# Patient Record
Sex: MALE | Race: WHITE | NOT HISPANIC OR LATINO
[De-identification: names, ages, dates, MRNs, and addresses within clinical notes are randomized per-mention and may not be internally consistent; named-entity substitution may affect disease eponyms.]

---

## 2017-01-10 ENCOUNTER — APPOINTMENT (OUTPATIENT)
Dept: FAMILY MEDICINE | Facility: CLINIC | Age: 77
End: 2017-01-10

## 2017-01-10 VITALS
SYSTOLIC BLOOD PRESSURE: 130 MMHG | DIASTOLIC BLOOD PRESSURE: 74 MMHG | OXYGEN SATURATION: 99 % | RESPIRATION RATE: 12 BRPM | BODY MASS INDEX: 20.86 KG/M2 | WEIGHT: 154 LBS | TEMPERATURE: 98.6 F | HEART RATE: 70 BPM | HEIGHT: 72 IN

## 2017-01-11 LAB
ALBUMIN SERPL ELPH-MCNC: 4.4 G/DL
ALP BLD-CCNC: 66 U/L
ALT SERPL-CCNC: 14 U/L
ANION GAP SERPL CALC-SCNC: 20 MMOL/L
AST SERPL-CCNC: 21 U/L
BASOPHILS # BLD AUTO: 0.03 K/UL
BASOPHILS NFR BLD AUTO: 0.3 %
BILIRUB SERPL-MCNC: 0.6 MG/DL
BUN SERPL-MCNC: 19 MG/DL
CALCIUM SERPL-MCNC: 9.5 MG/DL
CHLORIDE SERPL-SCNC: 101 MMOL/L
CO2 SERPL-SCNC: 23 MMOL/L
CREAT SERPL-MCNC: 0.91 MG/DL
EOSINOPHIL # BLD AUTO: 0.15 K/UL
EOSINOPHIL NFR BLD AUTO: 1.6 %
ERYTHROCYTE [SEDIMENTATION RATE] IN BLOOD BY WESTERGREN METHOD: 20 MM/HR
GLUCOSE SERPL-MCNC: 127 MG/DL
HCT VFR BLD CALC: 45.5 %
HGB BLD-MCNC: 14.8 G/DL
IMM GRANULOCYTES NFR BLD AUTO: 0.2 %
LYMPHOCYTES # BLD AUTO: 0.84 K/UL
LYMPHOCYTES NFR BLD AUTO: 8.7 %
MAN DIFF?: NORMAL
MCHC RBC-ENTMCNC: 32.5 GM/DL
MCHC RBC-ENTMCNC: 33.6 PG
MCV RBC AUTO: 103.4 FL
MONOCYTES # BLD AUTO: 1.05 K/UL
MONOCYTES NFR BLD AUTO: 10.9 %
NEUTROPHILS # BLD AUTO: 7.52 K/UL
NEUTROPHILS NFR BLD AUTO: 78.3 %
PLATELET # BLD AUTO: 200 K/UL
POTASSIUM SERPL-SCNC: 4.4 MMOL/L
PROT SERPL-MCNC: 6.8 G/DL
RBC # BLD: 4.4 M/UL
RBC # FLD: 13.1 %
SODIUM SERPL-SCNC: 144 MMOL/L
URATE SERPL-MCNC: 6.2 MG/DL
WBC # FLD AUTO: 9.61 K/UL

## 2017-01-16 ENCOUNTER — LABORATORY RESULT (OUTPATIENT)
Age: 77
End: 2017-01-16

## 2017-01-17 ENCOUNTER — APPOINTMENT (OUTPATIENT)
Dept: FAMILY MEDICINE | Facility: CLINIC | Age: 77
End: 2017-01-17

## 2017-01-17 VITALS
DIASTOLIC BLOOD PRESSURE: 70 MMHG | TEMPERATURE: 98.4 F | WEIGHT: 154 LBS | HEART RATE: 72 BPM | HEIGHT: 72 IN | OXYGEN SATURATION: 99 % | BODY MASS INDEX: 20.86 KG/M2 | RESPIRATION RATE: 13 BRPM | SYSTOLIC BLOOD PRESSURE: 130 MMHG

## 2017-01-17 RX ORDER — PREDNISONE 10 MG/1
10 TABLET ORAL
Qty: 30 | Refills: 1 | Status: DISCONTINUED | COMMUNITY
Start: 2017-01-10 | End: 2017-01-17

## 2017-01-18 LAB
24R-OH-CALCIDIOL SERPL-MCNC: 29.5 PG/ML
ALBUMIN SERPL ELPH-MCNC: 4.5 G/DL
ALP BLD-CCNC: 59 U/L
ALT SERPL-CCNC: 13 U/L
ANION GAP SERPL CALC-SCNC: 20 MMOL/L
AST SERPL-CCNC: 18 U/L
BASOPHILS # BLD AUTO: 0.08 K/UL
BASOPHILS NFR BLD AUTO: 0.9 %
BILIRUB SERPL-MCNC: 0.5 MG/DL
BUN SERPL-MCNC: 22 MG/DL
CALCIUM SERPL-MCNC: 9.8 MG/DL
CHLORIDE SERPL-SCNC: 98 MMOL/L
CHOLEST SERPL-MCNC: 173 MG/DL
CHOLEST/HDLC SERPL: 2.2 RATIO
CO2 SERPL-SCNC: 25 MMOL/L
CREAT SERPL-MCNC: 1.05 MG/DL
EOSINOPHIL # BLD AUTO: 0.24 K/UL
EOSINOPHIL NFR BLD AUTO: 2.7 %
ERYTHROCYTE [SEDIMENTATION RATE] IN BLOOD BY WESTERGREN METHOD: 2 MM/HR
FOLATE SERPL-MCNC: 16.5 NG/ML
GLUCOSE SERPL-MCNC: 133 MG/DL
HBA1C MFR BLD HPLC: 6.8 %
HCT VFR BLD CALC: 46.4 %
HDLC SERPL-MCNC: 77 MG/DL
HGB BLD-MCNC: 15.2 G/DL
LDLC SERPL CALC-MCNC: 80 MG/DL
LYMPHOCYTES # BLD AUTO: 1.83 K/UL
LYMPHOCYTES NFR BLD AUTO: 20.4 %
MAN DIFF?: NORMAL
MCHC RBC-ENTMCNC: 32.8 GM/DL
MCHC RBC-ENTMCNC: 33.8 PG
MCV RBC AUTO: 103.1 FL
MONOCYTES # BLD AUTO: 0.56 K/UL
MONOCYTES NFR BLD AUTO: 6.2 %
NEUTROPHILS # BLD AUTO: 6.26 K/UL
NEUTROPHILS NFR BLD AUTO: 69.8 %
PLATELET # BLD AUTO: 228 K/UL
POTASSIUM SERPL-SCNC: 4.6 MMOL/L
PROT SERPL-MCNC: 6.7 G/DL
PSA SERPL-MCNC: 1.8 NG/ML
RBC # BLD: 4.5 M/UL
RBC # FLD: 12.9 %
SODIUM SERPL-SCNC: 143 MMOL/L
T3 SERPL-MCNC: 110 NG/DL
T4 SERPL-MCNC: 8.5 UG/DL
TRIGL SERPL-MCNC: 78 MG/DL
TSH SERPL-ACNC: 2.89 UIU/ML
URATE SERPL-MCNC: 7.5 MG/DL
VIT B12 SERPL-MCNC: 391 PG/ML
WBC # FLD AUTO: 8.97 K/UL

## 2017-01-23 LAB — GLYCOMARK.: 8 UG/ML

## 2017-02-16 ENCOUNTER — APPOINTMENT (OUTPATIENT)
Dept: FAMILY MEDICINE | Facility: CLINIC | Age: 77
End: 2017-02-16

## 2017-03-08 ENCOUNTER — LABORATORY RESULT (OUTPATIENT)
Age: 77
End: 2017-03-08

## 2017-03-09 ENCOUNTER — APPOINTMENT (OUTPATIENT)
Dept: FAMILY MEDICINE | Facility: CLINIC | Age: 77
End: 2017-03-09

## 2017-03-09 VITALS
WEIGHT: 157 LBS | BODY MASS INDEX: 21.26 KG/M2 | DIASTOLIC BLOOD PRESSURE: 56 MMHG | HEART RATE: 76 BPM | SYSTOLIC BLOOD PRESSURE: 148 MMHG | RESPIRATION RATE: 13 BRPM | HEIGHT: 72 IN | OXYGEN SATURATION: 98 % | TEMPERATURE: 98.6 F

## 2017-03-16 LAB
ALBUMIN SERPL ELPH-MCNC: 4.4 G/DL
ALP BLD-CCNC: 66 U/L
ALT SERPL-CCNC: 17 U/L
ANION GAP SERPL CALC-SCNC: 18 MMOL/L
AST SERPL-CCNC: 16 U/L
BACTERIA UR CULT: NORMAL
BASOPHILS # BLD AUTO: 0.02 K/UL
BASOPHILS NFR BLD AUTO: 0.2 %
BILIRUB SERPL-MCNC: 0.5 MG/DL
BILIRUB UR QL STRIP: NORMAL
BUN SERPL-MCNC: 24 MG/DL
CALCIUM SERPL-MCNC: 9.9 MG/DL
CHLORIDE SERPL-SCNC: 101 MMOL/L
CLARITY UR: CLEAR
CO2 SERPL-SCNC: 22 MMOL/L
COLLECTION METHOD: NORMAL
CREAT SERPL-MCNC: 1.08 MG/DL
EOSINOPHIL # BLD AUTO: 0.12 K/UL
EOSINOPHIL NFR BLD AUTO: 1.5 %
GLUCOSE SERPL-MCNC: 152 MG/DL
GLUCOSE UR-MCNC: NORMAL
HCG UR QL: 0.2 EU/DL
HCT VFR BLD CALC: 42.5 %
HGB BLD-MCNC: 13.6 G/DL
HGB UR QL STRIP.AUTO: NORMAL
IMM GRANULOCYTES NFR BLD AUTO: 0.2 %
KETONES UR-MCNC: NORMAL
LEUKOCYTE ESTERASE UR QL STRIP: NORMAL
LYMPHOCYTES # BLD AUTO: 0.9 K/UL
LYMPHOCYTES NFR BLD AUTO: 11 %
MAN DIFF?: NORMAL
MCHC RBC-ENTMCNC: 32 GM/DL
MCHC RBC-ENTMCNC: 33.3 PG
MCV RBC AUTO: 103.9 FL
MONOCYTES # BLD AUTO: 0.86 K/UL
MONOCYTES NFR BLD AUTO: 10.5 %
NEUTROPHILS # BLD AUTO: 6.26 K/UL
NEUTROPHILS NFR BLD AUTO: 76.6 %
NITRITE UR QL STRIP: NORMAL
PH UR STRIP: 5.5
PLATELET # BLD AUTO: 192 K/UL
POTASSIUM SERPL-SCNC: 4.5 MMOL/L
PROT SERPL-MCNC: 6.8 G/DL
PROT UR STRIP-MCNC: NORMAL
RBC # BLD: 4.09 M/UL
RBC # FLD: 13.7 %
SODIUM SERPL-SCNC: 141 MMOL/L
SP GR UR STRIP: 1.01
URATE SERPL-MCNC: 3.6 MG/DL
WBC # FLD AUTO: 8.18 K/UL

## 2017-04-12 ENCOUNTER — APPOINTMENT (OUTPATIENT)
Dept: FAMILY MEDICINE | Facility: CLINIC | Age: 77
End: 2017-04-12

## 2017-04-12 ENCOUNTER — APPOINTMENT (OUTPATIENT)
Dept: SURGERY | Facility: CLINIC | Age: 77
End: 2017-04-12

## 2017-04-12 ENCOUNTER — LABORATORY RESULT (OUTPATIENT)
Age: 77
End: 2017-04-12

## 2017-04-12 VITALS
HEART RATE: 101 BPM | OXYGEN SATURATION: 99 % | DIASTOLIC BLOOD PRESSURE: 70 MMHG | TEMPERATURE: 98.3 F | BODY MASS INDEX: 21.67 KG/M2 | SYSTOLIC BLOOD PRESSURE: 156 MMHG | RESPIRATION RATE: 12 BRPM | HEIGHT: 72 IN | WEIGHT: 160 LBS

## 2017-04-12 VITALS
TEMPERATURE: 98.6 F | HEART RATE: 123 BPM | SYSTOLIC BLOOD PRESSURE: 160 MMHG | RESPIRATION RATE: 14 BRPM | DIASTOLIC BLOOD PRESSURE: 85 MMHG | HEIGHT: 69 IN | BODY MASS INDEX: 23.4 KG/M2 | WEIGHT: 158 LBS | OXYGEN SATURATION: 99 %

## 2017-04-12 VITALS — DIASTOLIC BLOOD PRESSURE: 90 MMHG | SYSTOLIC BLOOD PRESSURE: 167 MMHG

## 2017-04-13 LAB
ALBUMIN SERPL ELPH-MCNC: 4.4 G/DL
ALP BLD-CCNC: 59 U/L
ALT SERPL-CCNC: 21 U/L
ANION GAP SERPL CALC-SCNC: 17 MMOL/L
AST SERPL-CCNC: 25 U/L
BASOPHILS # BLD AUTO: 0.02 K/UL
BASOPHILS NFR BLD AUTO: 0.2 %
BILIRUB SERPL-MCNC: 0.4 MG/DL
BUN SERPL-MCNC: 25 MG/DL
CALCIUM SERPL-MCNC: 10.4 MG/DL
CHLORIDE SERPL-SCNC: 103 MMOL/L
CO2 SERPL-SCNC: 20 MMOL/L
CREAT SERPL-MCNC: 1.06 MG/DL
EOSINOPHIL # BLD AUTO: 0.08 K/UL
EOSINOPHIL NFR BLD AUTO: 0.9 %
GLUCOSE SERPL-MCNC: 179 MG/DL
HCT VFR BLD CALC: 41.7 %
HGB BLD-MCNC: 13.2 G/DL
IMM GRANULOCYTES NFR BLD AUTO: 0.2 %
LYMPHOCYTES # BLD AUTO: 0.86 K/UL
LYMPHOCYTES NFR BLD AUTO: 9.6 %
MAN DIFF?: NORMAL
MCHC RBC-ENTMCNC: 31.7 GM/DL
MCHC RBC-ENTMCNC: 32.8 PG
MCV RBC AUTO: 103.5 FL
MONOCYTES # BLD AUTO: 0.9 K/UL
MONOCYTES NFR BLD AUTO: 10 %
NEUTROPHILS # BLD AUTO: 7.09 K/UL
NEUTROPHILS NFR BLD AUTO: 79.1 %
PLATELET # BLD AUTO: 197 K/UL
POTASSIUM SERPL-SCNC: 5 MMOL/L
PROT SERPL-MCNC: 6.6 G/DL
RBC # BLD: 4.03 M/UL
RBC # FLD: 13.7 %
SODIUM SERPL-SCNC: 140 MMOL/L
WBC # FLD AUTO: 8.97 K/UL

## 2017-04-17 ENCOUNTER — APPOINTMENT (OUTPATIENT)
Dept: SURGERY | Facility: CLINIC | Age: 77
End: 2017-04-17

## 2017-04-17 VITALS
OXYGEN SATURATION: 99 % | SYSTOLIC BLOOD PRESSURE: 176 MMHG | HEART RATE: 95 BPM | TEMPERATURE: 97.9 F | DIASTOLIC BLOOD PRESSURE: 70 MMHG | RESPIRATION RATE: 15 BRPM | HEIGHT: 69 IN

## 2017-04-24 ENCOUNTER — APPOINTMENT (OUTPATIENT)
Dept: SURGERY | Facility: CLINIC | Age: 77
End: 2017-04-24

## 2017-05-01 ENCOUNTER — APPOINTMENT (OUTPATIENT)
Dept: SURGERY | Facility: CLINIC | Age: 77
End: 2017-05-01

## 2017-05-01 VITALS
TEMPERATURE: 98 F | DIASTOLIC BLOOD PRESSURE: 94 MMHG | HEART RATE: 90 BPM | BODY MASS INDEX: 23.11 KG/M2 | HEIGHT: 69 IN | SYSTOLIC BLOOD PRESSURE: 181 MMHG | OXYGEN SATURATION: 100 % | RESPIRATION RATE: 15 BRPM | WEIGHT: 156 LBS

## 2017-05-01 VITALS — DIASTOLIC BLOOD PRESSURE: 83 MMHG | SYSTOLIC BLOOD PRESSURE: 155 MMHG

## 2017-05-24 ENCOUNTER — APPOINTMENT (OUTPATIENT)
Dept: FAMILY MEDICINE | Facility: CLINIC | Age: 77
End: 2017-05-24

## 2017-05-24 VITALS
HEART RATE: 92 BPM | HEIGHT: 69 IN | TEMPERATURE: 98.5 F | BODY MASS INDEX: 22.66 KG/M2 | WEIGHT: 153 LBS | DIASTOLIC BLOOD PRESSURE: 70 MMHG | SYSTOLIC BLOOD PRESSURE: 156 MMHG | OXYGEN SATURATION: 97 %

## 2017-05-24 DIAGNOSIS — M79.673 PAIN IN UNSPECIFIED FOOT: ICD-10-CM

## 2017-05-24 RX ORDER — AMOXICILLIN AND CLAVULANATE POTASSIUM 875; 125 MG/1; MG/1
875-125 TABLET, COATED ORAL
Qty: 14 | Refills: 0 | Status: DISCONTINUED | COMMUNITY
Start: 2017-04-12 | End: 2017-05-24

## 2017-05-24 RX ORDER — ALLOPURINOL 300 MG/1
300 TABLET ORAL
Qty: 90 | Refills: 0 | Status: DISCONTINUED | COMMUNITY
Start: 2017-01-17 | End: 2017-05-24

## 2017-05-25 LAB
ALBUMIN SERPL ELPH-MCNC: 4.5 G/DL
ALP BLD-CCNC: 52 U/L
ALT SERPL-CCNC: 16 U/L
ANION GAP SERPL CALC-SCNC: 17 MMOL/L
AST SERPL-CCNC: 24 U/L
BASOPHILS # BLD AUTO: 0.03 K/UL
BASOPHILS NFR BLD AUTO: 0.3 %
BILIRUB SERPL-MCNC: 0.5 MG/DL
BUN SERPL-MCNC: 31 MG/DL
CALCIUM SERPL-MCNC: 10.2 MG/DL
CHLORIDE SERPL-SCNC: 104 MMOL/L
CHOLEST SERPL-MCNC: 209 MG/DL
CHOLEST/HDLC SERPL: 2.7 RATIO
CO2 SERPL-SCNC: 18 MMOL/L
CREAT SERPL-MCNC: 1.21 MG/DL
EOSINOPHIL # BLD AUTO: 0.14 K/UL
EOSINOPHIL NFR BLD AUTO: 1.5 %
GLUCOSE SERPL-MCNC: 120 MG/DL
HBA1C MFR BLD HPLC: 6.7 %
HCT VFR BLD CALC: 42.8 %
HDLC SERPL-MCNC: 77 MG/DL
HGB BLD-MCNC: 13.7 G/DL
IMM GRANULOCYTES NFR BLD AUTO: 0.3 %
LDLC SERPL CALC-MCNC: 109 MG/DL
LYMPHOCYTES # BLD AUTO: 1.01 K/UL
LYMPHOCYTES NFR BLD AUTO: 10.8 %
MAN DIFF?: NORMAL
MCHC RBC-ENTMCNC: 32 GM/DL
MCHC RBC-ENTMCNC: 32.9 PG
MCV RBC AUTO: 102.6 FL
MONOCYTES # BLD AUTO: 0.82 K/UL
MONOCYTES NFR BLD AUTO: 8.7 %
NEUTROPHILS # BLD AUTO: 7.36 K/UL
NEUTROPHILS NFR BLD AUTO: 78.4 %
PLATELET # BLD AUTO: 224 K/UL
POTASSIUM SERPL-SCNC: 4.9 MMOL/L
PROT SERPL-MCNC: 6.8 G/DL
RBC # BLD: 4.17 M/UL
RBC # FLD: 13.4 %
SODIUM SERPL-SCNC: 139 MMOL/L
TRIGL SERPL-MCNC: 115 MG/DL
URATE SERPL-MCNC: 8.8 MG/DL
WBC # FLD AUTO: 9.39 K/UL

## 2017-06-05 ENCOUNTER — APPOINTMENT (OUTPATIENT)
Dept: SURGERY | Facility: CLINIC | Age: 77
End: 2017-06-05

## 2017-06-13 ENCOUNTER — RESULT REVIEW (OUTPATIENT)
Age: 77
End: 2017-06-13

## 2017-06-14 ENCOUNTER — RESULT REVIEW (OUTPATIENT)
Age: 77
End: 2017-06-14

## 2017-06-21 ENCOUNTER — APPOINTMENT (OUTPATIENT)
Dept: FAMILY MEDICINE | Facility: CLINIC | Age: 77
End: 2017-06-21

## 2017-06-21 VITALS
WEIGHT: 154 LBS | TEMPERATURE: 99.2 F | OXYGEN SATURATION: 98 % | RESPIRATION RATE: 12 BRPM | HEART RATE: 98 BPM | SYSTOLIC BLOOD PRESSURE: 152 MMHG | HEIGHT: 69 IN | DIASTOLIC BLOOD PRESSURE: 74 MMHG | BODY MASS INDEX: 22.81 KG/M2

## 2017-06-22 ENCOUNTER — APPOINTMENT (OUTPATIENT)
Dept: FAMILY MEDICINE | Facility: CLINIC | Age: 77
End: 2017-06-22

## 2017-07-06 ENCOUNTER — APPOINTMENT (OUTPATIENT)
Dept: FAMILY MEDICINE | Facility: CLINIC | Age: 77
End: 2017-07-06

## 2017-07-06 VITALS — TEMPERATURE: 208.94 F

## 2017-07-06 VITALS
RESPIRATION RATE: 12 BRPM | HEIGHT: 69 IN | HEART RATE: 102 BPM | DIASTOLIC BLOOD PRESSURE: 80 MMHG | OXYGEN SATURATION: 98 % | SYSTOLIC BLOOD PRESSURE: 132 MMHG | BODY MASS INDEX: 22.81 KG/M2 | WEIGHT: 154 LBS

## 2017-07-06 RX ORDER — AMOXICILLIN AND CLAVULANATE POTASSIUM 875; 125 MG/1; MG/1
875-125 TABLET, COATED ORAL
Qty: 14 | Refills: 0 | Status: COMPLETED | COMMUNITY
Start: 2017-06-21 | End: 2017-07-06

## 2017-07-08 LAB
ALBUMIN SERPL ELPH-MCNC: 4.1 G/DL
ALP BLD-CCNC: 94 U/L
ALT SERPL-CCNC: 15 U/L
ANION GAP SERPL CALC-SCNC: 21 MMOL/L
AST SERPL-CCNC: 17 U/L
BASOPHILS # BLD AUTO: 0.05 K/UL
BASOPHILS NFR BLD AUTO: 0.6 %
BILIRUB SERPL-MCNC: 0.4 MG/DL
BUN SERPL-MCNC: 25 MG/DL
CALCIUM SERPL-MCNC: 9.7 MG/DL
CHLORIDE SERPL-SCNC: 104 MMOL/L
CO2 SERPL-SCNC: 19 MMOL/L
CREAT SERPL-MCNC: 1.08 MG/DL
EOSINOPHIL # BLD AUTO: 0.99 K/UL
EOSINOPHIL NFR BLD AUTO: 12.3 %
GLUCOSE SERPL-MCNC: 113 MG/DL
HCT VFR BLD CALC: 38.6 %
HGB BLD-MCNC: 12.5 G/DL
IMM GRANULOCYTES NFR BLD AUTO: 0.2 %
LYMPHOCYTES # BLD AUTO: 0.94 K/UL
LYMPHOCYTES NFR BLD AUTO: 11.6 %
MAN DIFF?: NORMAL
MCHC RBC-ENTMCNC: 32.4 GM/DL
MCHC RBC-ENTMCNC: 32.7 PG
MCV RBC AUTO: 101 FL
MONOCYTES # BLD AUTO: 0.97 K/UL
MONOCYTES NFR BLD AUTO: 12 %
NEUTROPHILS # BLD AUTO: 5.11 K/UL
NEUTROPHILS NFR BLD AUTO: 63.3 %
PLATELET # BLD AUTO: 302 K/UL
POTASSIUM SERPL-SCNC: 4.6 MMOL/L
PROT SERPL-MCNC: 6.7 G/DL
RBC # BLD: 3.82 M/UL
RBC # FLD: 13.3 %
SODIUM SERPL-SCNC: 144 MMOL/L
WBC # FLD AUTO: 8.08 K/UL

## 2017-07-13 ENCOUNTER — APPOINTMENT (OUTPATIENT)
Dept: FAMILY MEDICINE | Facility: CLINIC | Age: 77
End: 2017-07-13

## 2017-07-13 VITALS
HEIGHT: 69 IN | OXYGEN SATURATION: 99 % | SYSTOLIC BLOOD PRESSURE: 154 MMHG | HEART RATE: 94 BPM | DIASTOLIC BLOOD PRESSURE: 60 MMHG | TEMPERATURE: 98 F | WEIGHT: 147 LBS | RESPIRATION RATE: 12 BRPM | BODY MASS INDEX: 21.77 KG/M2

## 2017-07-13 RX ORDER — AMLODIPINE BESYLATE 5 MG/1
5 TABLET ORAL
Qty: 90 | Refills: 0 | Status: COMPLETED | COMMUNITY
Start: 2017-07-06 | End: 2017-07-13

## 2017-08-24 ENCOUNTER — APPOINTMENT (OUTPATIENT)
Dept: FAMILY MEDICINE | Facility: CLINIC | Age: 77
End: 2017-08-24
Payer: MEDICARE

## 2017-08-24 VITALS
WEIGHT: 148 LBS | OXYGEN SATURATION: 95 % | BODY MASS INDEX: 21.92 KG/M2 | SYSTOLIC BLOOD PRESSURE: 146 MMHG | HEART RATE: 80 BPM | TEMPERATURE: 98 F | RESPIRATION RATE: 12 BRPM | DIASTOLIC BLOOD PRESSURE: 58 MMHG | HEIGHT: 69 IN

## 2017-08-24 PROCEDURE — 99214 OFFICE O/P EST MOD 30 MIN: CPT | Mod: 25

## 2017-08-24 PROCEDURE — 36415 COLL VENOUS BLD VENIPUNCTURE: CPT

## 2017-08-30 ENCOUNTER — RX RENEWAL (OUTPATIENT)
Age: 77
End: 2017-08-30

## 2017-08-30 LAB
BASOPHILS # BLD AUTO: 0.04 K/UL
BASOPHILS NFR BLD AUTO: 0.6 %
CHOLEST SERPL-MCNC: 134 MG/DL
CHOLEST/HDLC SERPL: 2.7 RATIO
EOSINOPHIL # BLD AUTO: 0.23 K/UL
EOSINOPHIL NFR BLD AUTO: 3.2 %
HBA1C MFR BLD HPLC: 6.3 %
HCT VFR BLD CALC: 42.1 %
HDLC SERPL-MCNC: 50 MG/DL
HGB BLD-MCNC: 13.2 G/DL
IMM GRANULOCYTES NFR BLD AUTO: 0.3 %
LDLC SERPL CALC-MCNC: 65 MG/DL
LYMPHOCYTES # BLD AUTO: 1 K/UL
LYMPHOCYTES NFR BLD AUTO: 13.9 %
MAN DIFF?: NORMAL
MCHC RBC-ENTMCNC: 31.4 GM/DL
MCHC RBC-ENTMCNC: 32 PG
MCV RBC AUTO: 102.2 FL
MONOCYTES # BLD AUTO: 0.74 K/UL
MONOCYTES NFR BLD AUTO: 10.3 %
NEUTROPHILS # BLD AUTO: 5.14 K/UL
NEUTROPHILS NFR BLD AUTO: 71.7 %
PLATELET # BLD AUTO: 201 K/UL
RBC # BLD: 4.12 M/UL
RBC # FLD: 13.9 %
TRIGL SERPL-MCNC: 95 MG/DL
WBC # FLD AUTO: 7.17 K/UL

## 2017-10-04 ENCOUNTER — APPOINTMENT (OUTPATIENT)
Dept: FAMILY MEDICINE | Facility: CLINIC | Age: 77
End: 2017-10-04
Payer: MEDICARE

## 2017-10-04 VITALS
SYSTOLIC BLOOD PRESSURE: 148 MMHG | TEMPERATURE: 99 F | BODY MASS INDEX: 22.22 KG/M2 | HEART RATE: 102 BPM | HEIGHT: 69 IN | DIASTOLIC BLOOD PRESSURE: 68 MMHG | RESPIRATION RATE: 12 BRPM | OXYGEN SATURATION: 98 % | WEIGHT: 150 LBS

## 2017-10-04 PROCEDURE — G0008: CPT

## 2017-10-04 PROCEDURE — 90662 IIV NO PRSV INCREASED AG IM: CPT

## 2017-12-01 ENCOUNTER — APPOINTMENT (OUTPATIENT)
Dept: FAMILY MEDICINE | Facility: CLINIC | Age: 77
End: 2017-12-01
Payer: MEDICARE

## 2017-12-01 VITALS
DIASTOLIC BLOOD PRESSURE: 70 MMHG | BODY MASS INDEX: 22.81 KG/M2 | HEART RATE: 62 BPM | OXYGEN SATURATION: 96 % | WEIGHT: 154 LBS | RESPIRATION RATE: 12 BRPM | SYSTOLIC BLOOD PRESSURE: 156 MMHG | TEMPERATURE: 97.7 F | HEIGHT: 69 IN

## 2017-12-01 PROCEDURE — 36415 COLL VENOUS BLD VENIPUNCTURE: CPT

## 2017-12-01 PROCEDURE — 99214 OFFICE O/P EST MOD 30 MIN: CPT | Mod: 25

## 2017-12-06 ENCOUNTER — RESULT REVIEW (OUTPATIENT)
Age: 77
End: 2017-12-06

## 2017-12-06 LAB
ALBUMIN SERPL ELPH-MCNC: 4.6 G/DL
ALP BLD-CCNC: 60 U/L
ALT SERPL-CCNC: 15 U/L
ANION GAP SERPL CALC-SCNC: 18 MMOL/L
AST SERPL-CCNC: 25 U/L
BASOPHILS # BLD AUTO: 0.04 K/UL
BASOPHILS NFR BLD AUTO: 0.6 %
BILIRUB SERPL-MCNC: 0.5 MG/DL
BUN SERPL-MCNC: 24 MG/DL
CALCIUM SERPL-MCNC: 9.8 MG/DL
CHLORIDE SERPL-SCNC: 100 MMOL/L
CHOLEST SERPL-MCNC: 148 MG/DL
CHOLEST/HDLC SERPL: 2.1 RATIO
CO2 SERPL-SCNC: 24 MMOL/L
CREAT SERPL-MCNC: 1.12 MG/DL
EOSINOPHIL # BLD AUTO: 0.31 K/UL
EOSINOPHIL NFR BLD AUTO: 4.9 %
GLUCOSE SERPL-MCNC: 104 MG/DL
HBA1C MFR BLD HPLC: 6.5 %
HCT VFR BLD CALC: 42.5 %
HDLC SERPL-MCNC: 69 MG/DL
HGB BLD-MCNC: 14.3 G/DL
IMM GRANULOCYTES NFR BLD AUTO: 0.2 %
LDLC SERPL CALC-MCNC: 65 MG/DL
LYMPHOCYTES # BLD AUTO: 0.82 K/UL
LYMPHOCYTES NFR BLD AUTO: 12.9 %
MAN DIFF?: NORMAL
MCHC RBC-ENTMCNC: 33.6 GM/DL
MCHC RBC-ENTMCNC: 34.2 PG
MCV RBC AUTO: 101.7 FL
MONOCYTES # BLD AUTO: 0.81 K/UL
MONOCYTES NFR BLD AUTO: 12.8 %
NEUTROPHILS # BLD AUTO: 4.35 K/UL
NEUTROPHILS NFR BLD AUTO: 68.6 %
PLATELET # BLD AUTO: 201 K/UL
POTASSIUM SERPL-SCNC: 4.7 MMOL/L
PROT SERPL-MCNC: 7.1 G/DL
RBC # BLD: 4.18 M/UL
RBC # FLD: 13.2 %
SODIUM SERPL-SCNC: 142 MMOL/L
TRIGL SERPL-MCNC: 69 MG/DL
TSH SERPL-ACNC: 2.51 UIU/ML
WBC # FLD AUTO: 6.34 K/UL

## 2017-12-06 RX ORDER — COLCHICINE 0.6 MG/1
0.6 TABLET ORAL
Qty: 30 | Refills: 0 | Status: COMPLETED | COMMUNITY
Start: 2017-07-30

## 2018-03-01 ENCOUNTER — APPOINTMENT (OUTPATIENT)
Dept: FAMILY MEDICINE | Facility: CLINIC | Age: 78
End: 2018-03-01
Payer: MEDICARE

## 2018-03-01 VITALS
DIASTOLIC BLOOD PRESSURE: 70 MMHG | WEIGHT: 154 LBS | BODY MASS INDEX: 22.81 KG/M2 | TEMPERATURE: 98 F | HEIGHT: 69 IN | SYSTOLIC BLOOD PRESSURE: 127 MMHG | HEART RATE: 78 BPM

## 2018-03-01 LAB
BILIRUB UR QL STRIP: NORMAL
CLARITY UR: CLEAR
COLLECTION METHOD: NORMAL
GLUCOSE UR-MCNC: NORMAL
HCG UR QL: 0.2 EU/DL
HGB UR QL STRIP.AUTO: NORMAL
KETONES UR-MCNC: NORMAL
LEUKOCYTE ESTERASE UR QL STRIP: NORMAL
NITRITE UR QL STRIP: NORMAL
PH UR STRIP: 5.5
PROT UR STRIP-MCNC: NORMAL
SP GR UR STRIP: 1.01

## 2018-03-01 PROCEDURE — 81003 URINALYSIS AUTO W/O SCOPE: CPT | Mod: QW

## 2018-03-01 PROCEDURE — 36415 COLL VENOUS BLD VENIPUNCTURE: CPT

## 2018-03-01 PROCEDURE — 99214 OFFICE O/P EST MOD 30 MIN: CPT | Mod: 25

## 2018-03-08 ENCOUNTER — RESULT REVIEW (OUTPATIENT)
Age: 78
End: 2018-03-08

## 2018-03-08 LAB
25(OH)D3 SERPL-MCNC: 39.4 NG/ML
ALBUMIN SERPL ELPH-MCNC: 4.6 G/DL
ALP BLD-CCNC: 58 U/L
ALT SERPL-CCNC: 16 U/L
ANION GAP SERPL CALC-SCNC: 18 MMOL/L
AST SERPL-CCNC: 26 U/L
BASOPHILS # BLD AUTO: 0.04 K/UL
BASOPHILS NFR BLD AUTO: 0.6 %
BILIRUB SERPL-MCNC: 0.4 MG/DL
BUN SERPL-MCNC: 23 MG/DL
CALCIUM SERPL-MCNC: 9.8 MG/DL
CHLORIDE SERPL-SCNC: 102 MMOL/L
CHOLEST SERPL-MCNC: 150 MG/DL
CHOLEST/HDLC SERPL: 2.2 RATIO
CO2 SERPL-SCNC: 23 MMOL/L
CREAT SERPL-MCNC: 1.02 MG/DL
EOSINOPHIL # BLD AUTO: 0.24 K/UL
EOSINOPHIL NFR BLD AUTO: 3.4 %
GLUCOSE SERPL-MCNC: 131 MG/DL
HBA1C MFR BLD HPLC: 6.9 %
HCT VFR BLD CALC: 44.3 %
HDLC SERPL-MCNC: 68 MG/DL
HGB BLD-MCNC: 14.4 G/DL
IMM GRANULOCYTES NFR BLD AUTO: 0.4 %
LDLC SERPL CALC-MCNC: 67 MG/DL
LYMPHOCYTES # BLD AUTO: 1.17 K/UL
LYMPHOCYTES NFR BLD AUTO: 16.7 %
MAN DIFF?: NORMAL
MCHC RBC-ENTMCNC: 32.5 GM/DL
MCHC RBC-ENTMCNC: 33.8 PG
MCV RBC AUTO: 104 FL
MONOCYTES # BLD AUTO: 0.75 K/UL
MONOCYTES NFR BLD AUTO: 10.7 %
NEUTROPHILS # BLD AUTO: 4.78 K/UL
NEUTROPHILS NFR BLD AUTO: 68.2 %
PLATELET # BLD AUTO: 185 K/UL
POTASSIUM SERPL-SCNC: 5.1 MMOL/L
PROT SERPL-MCNC: 6.9 G/DL
RBC # BLD: 4.26 M/UL
RBC # FLD: 13.1 %
SODIUM SERPL-SCNC: 143 MMOL/L
TRIGL SERPL-MCNC: 73 MG/DL
TSH SERPL-ACNC: 2.33 UIU/ML
WBC # FLD AUTO: 7.01 K/UL

## 2018-05-08 ENCOUNTER — APPOINTMENT (OUTPATIENT)
Dept: FAMILY MEDICINE | Facility: CLINIC | Age: 78
End: 2018-05-08
Payer: MEDICARE

## 2018-05-08 VITALS
RESPIRATION RATE: 14 BRPM | OXYGEN SATURATION: 97 % | DIASTOLIC BLOOD PRESSURE: 78 MMHG | BODY MASS INDEX: 22.51 KG/M2 | HEART RATE: 64 BPM | TEMPERATURE: 98.4 F | SYSTOLIC BLOOD PRESSURE: 116 MMHG | WEIGHT: 152 LBS | HEIGHT: 69 IN

## 2018-05-08 DIAGNOSIS — Z86.39 PERSONAL HISTORY OF OTHER ENDOCRINE, NUTRITIONAL AND METABOLIC DISEASE: ICD-10-CM

## 2018-05-08 PROCEDURE — 99214 OFFICE O/P EST MOD 30 MIN: CPT

## 2018-05-15 ENCOUNTER — APPOINTMENT (OUTPATIENT)
Dept: FAMILY MEDICINE | Facility: CLINIC | Age: 78
End: 2018-05-15
Payer: MEDICARE

## 2018-05-15 VITALS
BODY MASS INDEX: 22.96 KG/M2 | DIASTOLIC BLOOD PRESSURE: 84 MMHG | HEIGHT: 69 IN | SYSTOLIC BLOOD PRESSURE: 124 MMHG | TEMPERATURE: 98 F | HEART RATE: 100 BPM | WEIGHT: 155 LBS | OXYGEN SATURATION: 99 %

## 2018-05-15 DIAGNOSIS — R94.31 ABNORMAL ELECTROCARDIOGRAM [ECG] [EKG]: ICD-10-CM

## 2018-05-15 PROCEDURE — 36415 COLL VENOUS BLD VENIPUNCTURE: CPT

## 2018-05-15 PROCEDURE — 99215 OFFICE O/P EST HI 40 MIN: CPT | Mod: 25

## 2018-05-15 PROCEDURE — 93000 ELECTROCARDIOGRAM COMPLETE: CPT | Mod: 59

## 2018-05-15 NOTE — PHYSICAL EXAM
[No Acute Distress] : no acute distress [Well Nourished] : well nourished [PERRL] : pupils equal round and reactive to light [Normal Outer Ear/Nose] : the outer ears and nose were normal in appearance [Normal Oropharynx] : the oropharynx was normal [Supple] : supple [No Lymphadenopathy] : no lymphadenopathy [No Respiratory Distress] : no respiratory distress  [Clear to Auscultation] : lungs were clear to auscultation bilaterally [Normal Rate] : normal rate  [Regular Rhythm] : with a regular rhythm [Normal S1, S2] : normal S1 and S2 [Normal Bowel Sounds] : normal bowel sounds [Normal Posterior Cervical Nodes] : no posterior cervical lymphadenopathy [Normal Anterior Cervical Nodes] : no anterior cervical lymphadenopathy [Normal Gait] : normal gait [Normal Affect] : the affect was normal [Normal Insight/Judgement] : insight and judgment were intact

## 2018-05-16 LAB
ALBUMIN SERPL ELPH-MCNC: 4.3 G/DL
ALP BLD-CCNC: 60 U/L
ALT SERPL-CCNC: 17 U/L
ANION GAP SERPL CALC-SCNC: 15 MMOL/L
AST SERPL-CCNC: 20 U/L
BASOPHILS # BLD AUTO: 0.05 K/UL
BASOPHILS NFR BLD AUTO: 0.6 %
BILIRUB SERPL-MCNC: 0.5 MG/DL
BUN SERPL-MCNC: 20 MG/DL
CALCIUM SERPL-MCNC: 9.5 MG/DL
CHLORIDE SERPL-SCNC: 100 MMOL/L
CO2 SERPL-SCNC: 27 MMOL/L
CREAT SERPL-MCNC: 1.07 MG/DL
EOSINOPHIL # BLD AUTO: 0.54 K/UL
EOSINOPHIL NFR BLD AUTO: 6.9 %
GLUCOSE SERPL-MCNC: 155 MG/DL
HCT VFR BLD CALC: 42.3 %
HGB BLD-MCNC: 13.8 G/DL
IMM GRANULOCYTES NFR BLD AUTO: 0.4 %
LYMPHOCYTES # BLD AUTO: 1.56 K/UL
LYMPHOCYTES NFR BLD AUTO: 19.9 %
MAN DIFF?: NORMAL
MCHC RBC-ENTMCNC: 32.6 GM/DL
MCHC RBC-ENTMCNC: 33.3 PG
MCV RBC AUTO: 101.9 FL
MONOCYTES # BLD AUTO: 0.82 K/UL
MONOCYTES NFR BLD AUTO: 10.5 %
NEUTROPHILS # BLD AUTO: 4.84 K/UL
NEUTROPHILS NFR BLD AUTO: 61.7 %
PLATELET # BLD AUTO: 208 K/UL
POTASSIUM SERPL-SCNC: 5 MMOL/L
PROT SERPL-MCNC: 7.1 G/DL
RBC # BLD: 4.15 M/UL
RBC # FLD: 13.6 %
SODIUM SERPL-SCNC: 142 MMOL/L
WBC # FLD AUTO: 7.84 K/UL

## 2018-05-21 NOTE — ASSESSMENT
[Cardiology consultation] : Cardiology consultation [Patient Optimized for Surgery] : Patient optimized for surgery [No Further Testing Recommended] : no further testing recommended [FreeTextEntry4] : Patient is clear for procedure

## 2018-05-21 NOTE — HISTORY OF PRESENT ILLNESS
[Hypertension] : ~T hypertension [FreeTextEntry1] : Cataract OS [FreeTextEntry2] : 5/22/18 [FreeTextEntry3] : Dr Arnold [FreeTextEntry4] : Patient presents for Preop Clearance. Blood work is non fasting.

## 2018-05-25 ENCOUNTER — APPOINTMENT (OUTPATIENT)
Dept: FAMILY MEDICINE | Facility: CLINIC | Age: 78
End: 2018-05-25
Payer: MEDICARE

## 2018-05-25 ENCOUNTER — NON-APPOINTMENT (OUTPATIENT)
Age: 78
End: 2018-05-25

## 2018-05-25 VITALS
TEMPERATURE: 98.2 F | HEART RATE: 98 BPM | OXYGEN SATURATION: 97 % | WEIGHT: 148 LBS | HEIGHT: 69 IN | RESPIRATION RATE: 14 BRPM | SYSTOLIC BLOOD PRESSURE: 124 MMHG | DIASTOLIC BLOOD PRESSURE: 78 MMHG | BODY MASS INDEX: 21.92 KG/M2

## 2018-05-25 PROCEDURE — 99214 OFFICE O/P EST MOD 30 MIN: CPT | Mod: 25

## 2018-05-25 PROCEDURE — 94010 BREATHING CAPACITY TEST: CPT | Mod: 59

## 2018-05-25 PROCEDURE — 36415 COLL VENOUS BLD VENIPUNCTURE: CPT

## 2018-05-26 LAB
ALBUMIN SERPL ELPH-MCNC: 3.8 G/DL
ALP BLD-CCNC: 50 U/L
ALT SERPL-CCNC: 15 U/L
ANION GAP SERPL CALC-SCNC: 17 MMOL/L
AST SERPL-CCNC: 22 U/L
BASOPHILS # BLD AUTO: 0.06 K/UL
BASOPHILS NFR BLD AUTO: 0.6 %
BILIRUB SERPL-MCNC: 0.4 MG/DL
BUN SERPL-MCNC: 22 MG/DL
CALCIUM SERPL-MCNC: 9 MG/DL
CHLORIDE SERPL-SCNC: 100 MMOL/L
CO2 SERPL-SCNC: 22 MMOL/L
CREAT SERPL-MCNC: 1.02 MG/DL
EOSINOPHIL # BLD AUTO: 0.6 K/UL
EOSINOPHIL NFR BLD AUTO: 6.2 %
GLUCOSE SERPL-MCNC: 235 MG/DL
HCT VFR BLD CALC: 41.3 %
HGB BLD-MCNC: 13.9 G/DL
IMM GRANULOCYTES NFR BLD AUTO: 0.9 %
LYMPHOCYTES # BLD AUTO: 1.2 K/UL
LYMPHOCYTES NFR BLD AUTO: 12.3 %
MAN DIFF?: NORMAL
MCHC RBC-ENTMCNC: 33.7 GM/DL
MCHC RBC-ENTMCNC: 33.8 PG
MCV RBC AUTO: 100.5 FL
MONOCYTES # BLD AUTO: 0.81 K/UL
MONOCYTES NFR BLD AUTO: 8.3 %
NEUTROPHILS # BLD AUTO: 6.96 K/UL
NEUTROPHILS NFR BLD AUTO: 71.7 %
PLATELET # BLD AUTO: 240 K/UL
POTASSIUM SERPL-SCNC: 4.3 MMOL/L
PROT SERPL-MCNC: 6.3 G/DL
RBC # BLD: 4.11 M/UL
RBC # FLD: 12.9 %
SODIUM SERPL-SCNC: 139 MMOL/L
WBC # FLD AUTO: 9.72 K/UL

## 2018-05-26 NOTE — PLAN
[FreeTextEntry1] : Advised to continue medications as directed. Life style and diet were reviewed.\par Will review blood work and chest xray when available

## 2018-05-26 NOTE — HISTORY OF PRESENT ILLNESS
[FreeTextEntry1] : Patient presents with C/O  cough and mucus in his throat that has become chronic. Denies fever, sore throat or ear pain.\par Recently had Cataract surgery of left eye. Very happy with results. Sub conjunctival  hemorrhage of left eye . Patient is under care of Ophthalmologist.\par Patient is under care of Cardiologist

## 2018-05-26 NOTE — PHYSICAL EXAM
[No Acute Distress] : no acute distress [Well Nourished] : well nourished [Normal Outer Ear/Nose] : the outer ears and nose were normal in appearance [Normal Oropharynx] : the oropharynx was normal [Supple] : supple [No Lymphadenopathy] : no lymphadenopathy [No Respiratory Distress] : no respiratory distress  [Clear to Auscultation] : lungs were clear to auscultation bilaterally [Normal Rate] : normal rate  [Regular Rhythm] : with a regular rhythm [Normal Posterior Cervical Nodes] : no posterior cervical lymphadenopathy [Normal Anterior Cervical Nodes] : no anterior cervical lymphadenopathy [Normal Gait] : normal gait [Coordination Grossly Intact] : coordination grossly intact [Normal Affect] : the affect was normal [Normal Insight/Judgement] : insight and judgment were intact [de-identified] : Left eye subconjunctival hemorrhage

## 2018-06-05 ENCOUNTER — APPOINTMENT (OUTPATIENT)
Dept: FAMILY MEDICINE | Facility: CLINIC | Age: 78
End: 2018-06-05
Payer: MEDICARE

## 2018-06-05 VITALS
DIASTOLIC BLOOD PRESSURE: 78 MMHG | HEART RATE: 95 BPM | TEMPERATURE: 99.2 F | SYSTOLIC BLOOD PRESSURE: 138 MMHG | OXYGEN SATURATION: 95 % | RESPIRATION RATE: 13 BRPM | BODY MASS INDEX: 21.86 KG/M2 | WEIGHT: 148 LBS

## 2018-06-05 PROCEDURE — 99214 OFFICE O/P EST MOD 30 MIN: CPT | Mod: 25

## 2018-06-05 PROCEDURE — 36415 COLL VENOUS BLD VENIPUNCTURE: CPT

## 2018-06-05 RX ORDER — AMOXICILLIN AND CLAVULANATE POTASSIUM 875; 125 MG/1; MG/1
875-125 TABLET, COATED ORAL
Qty: 14 | Refills: 0 | Status: DISCONTINUED | COMMUNITY
Start: 2018-05-08 | End: 2018-06-05

## 2018-06-05 NOTE — HISTORY OF PRESENT ILLNESS
[Spouse] : spouse [de-identified] : Patient presents for follow up of health care . Stated still having a productive cough. Chest  X ray was negative for active infection.\par Diet is good and drinking water daily. Compliant with medications.\par \par Eye Exam - 5/18\par Colonoscopy -Defers\par Pneumonia vaccine - Pneumovax 23 10/15

## 2018-06-05 NOTE — COUNSELING
[Healthy eating counseling provided] : healthy eating [Activity counseling provided] : activity [Low Fat Diet] : Low fat diet [Low Salt Diet] : Low salt diet [Walking] : Walking

## 2018-06-05 NOTE — PHYSICAL EXAM
[Normal Outer Ear/Nose] : the outer ears and nose were normal in appearance [No Respiratory Distress] : no respiratory distress  [de-identified] : Left eye healing subconjunctival hemorrhage

## 2018-06-06 ENCOUNTER — RESULT REVIEW (OUTPATIENT)
Age: 78
End: 2018-06-06

## 2018-06-06 LAB
25(OH)D3 SERPL-MCNC: 39.4 NG/ML
ALBUMIN SERPL ELPH-MCNC: 4.3 G/DL
ALP BLD-CCNC: 58 U/L
ALT SERPL-CCNC: 11 U/L
ANION GAP SERPL CALC-SCNC: 18 MMOL/L
AST SERPL-CCNC: 18 U/L
BASOPHILS # BLD AUTO: 0.03 K/UL
BASOPHILS NFR BLD AUTO: 0.4 %
BILIRUB SERPL-MCNC: 0.9 MG/DL
BUN SERPL-MCNC: 19 MG/DL
CALCIUM SERPL-MCNC: 9.3 MG/DL
CHLORIDE SERPL-SCNC: 104 MMOL/L
CHOLEST SERPL-MCNC: 146 MG/DL
CHOLEST/HDLC SERPL: 2.6 RATIO
CO2 SERPL-SCNC: 22 MMOL/L
CREAT SERPL-MCNC: 1.03 MG/DL
CREAT SPEC-SCNC: 90 MG/DL
EOSINOPHIL # BLD AUTO: 0.15 K/UL
EOSINOPHIL NFR BLD AUTO: 2 %
GLUCOSE SERPL-MCNC: 134 MG/DL
HBA1C MFR BLD HPLC: 6.8 %
HCT VFR BLD CALC: 44.4 %
HDLC SERPL-MCNC: 56 MG/DL
HGB BLD-MCNC: 14.2 G/DL
IMM GRANULOCYTES NFR BLD AUTO: 0.1 %
LDLC SERPL CALC-MCNC: 76 MG/DL
LYMPHOCYTES # BLD AUTO: 1.06 K/UL
LYMPHOCYTES NFR BLD AUTO: 14.1 %
MAN DIFF?: NORMAL
MCHC RBC-ENTMCNC: 32 GM/DL
MCHC RBC-ENTMCNC: 33.1 PG
MCV RBC AUTO: 103.5 FL
MICROALBUMIN 24H UR DL<=1MG/L-MCNC: 1.6 MG/DL
MICROALBUMIN/CREAT 24H UR-RTO: 18 MG/G
MONOCYTES # BLD AUTO: 0.81 K/UL
MONOCYTES NFR BLD AUTO: 10.7 %
NEUTROPHILS # BLD AUTO: 5.48 K/UL
NEUTROPHILS NFR BLD AUTO: 72.7 %
PLATELET # BLD AUTO: 221 K/UL
POTASSIUM SERPL-SCNC: 5 MMOL/L
PROT SERPL-MCNC: 6.7 G/DL
RBC # BLD: 4.29 M/UL
RBC # FLD: 13.8 %
SODIUM SERPL-SCNC: 144 MMOL/L
TRIGL SERPL-MCNC: 69 MG/DL
WBC # FLD AUTO: 7.54 K/UL

## 2018-06-09 ENCOUNTER — RESULT REVIEW (OUTPATIENT)
Age: 78
End: 2018-06-09

## 2018-06-09 ENCOUNTER — RX RENEWAL (OUTPATIENT)
Age: 78
End: 2018-06-09

## 2018-06-11 ENCOUNTER — RX RENEWAL (OUTPATIENT)
Age: 78
End: 2018-06-11

## 2018-08-03 ENCOUNTER — MED ADMIN CHARGE (OUTPATIENT)
Age: 78
End: 2018-08-03

## 2018-08-03 ENCOUNTER — OTHER (OUTPATIENT)
Age: 78
End: 2018-08-03

## 2018-08-03 ENCOUNTER — APPOINTMENT (OUTPATIENT)
Dept: FAMILY MEDICINE | Facility: CLINIC | Age: 78
End: 2018-08-03
Payer: MEDICARE

## 2018-08-03 VITALS
OXYGEN SATURATION: 98 % | WEIGHT: 149 LBS | HEART RATE: 83 BPM | RESPIRATION RATE: 13 BRPM | DIASTOLIC BLOOD PRESSURE: 74 MMHG | BODY MASS INDEX: 22.07 KG/M2 | HEIGHT: 69 IN | SYSTOLIC BLOOD PRESSURE: 122 MMHG | TEMPERATURE: 97.8 F

## 2018-08-03 LAB
BILIRUB UR QL STRIP: NORMAL
CLARITY UR: CLEAR
COLLECTION METHOD: NORMAL
GLUCOSE UR-MCNC: NORMAL
HCG UR QL: 0.2 EU/DL
HGB UR QL STRIP.AUTO: NORMAL
KETONES UR-MCNC: NORMAL
LEUKOCYTE ESTERASE UR QL STRIP: NORMAL
NITRITE UR QL STRIP: NORMAL
PH UR STRIP: 6
PROT UR STRIP-MCNC: NORMAL
SP GR UR STRIP: 1.01

## 2018-08-03 PROCEDURE — 81003 URINALYSIS AUTO W/O SCOPE: CPT | Mod: QW

## 2018-08-03 PROCEDURE — 99214 OFFICE O/P EST MOD 30 MIN: CPT | Mod: 25

## 2018-08-03 PROCEDURE — 36415 COLL VENOUS BLD VENIPUNCTURE: CPT

## 2018-08-03 PROCEDURE — 90715 TDAP VACCINE 7 YRS/> IM: CPT

## 2018-08-03 PROCEDURE — 90471 IMMUNIZATION ADMIN: CPT | Mod: 59

## 2018-08-03 NOTE — COUNSELING
[Healthy eating counseling provided] : healthy eating [Behavioral health counseling provided] : behavioral health  [Low Fat Diet] : Low fat diet [Low Salt Diet] : Low salt diet [Sleep ___ hours/day] : Sleep [unfilled] hours/day [Engage in a relaxing activity] : Engage in a relaxing activity [None] : None

## 2018-08-07 LAB
ALBUMIN SERPL ELPH-MCNC: 4.9 G/DL
ALP BLD-CCNC: 55 U/L
ALT SERPL-CCNC: 15 U/L
ANION GAP SERPL CALC-SCNC: 14 MMOL/L
AST SERPL-CCNC: 23 U/L
BASOPHILS # BLD AUTO: 0.04 K/UL
BASOPHILS NFR BLD AUTO: 0.5 %
BILIRUB SERPL-MCNC: 0.6 MG/DL
BUN SERPL-MCNC: 29 MG/DL
CALCIUM SERPL-MCNC: 10 MG/DL
CHLORIDE SERPL-SCNC: 100 MMOL/L
CO2 SERPL-SCNC: 26 MMOL/L
CREAT SERPL-MCNC: 1.05 MG/DL
EOSINOPHIL # BLD AUTO: 0.28 K/UL
EOSINOPHIL NFR BLD AUTO: 3.5 %
GLUCOSE SERPL-MCNC: 130 MG/DL
HCT VFR BLD CALC: 45.3 %
HGB BLD-MCNC: 14 G/DL
IMM GRANULOCYTES NFR BLD AUTO: 0.2 %
INR PPP: 0.87 RATIO
LYMPHOCYTES # BLD AUTO: 1.08 K/UL
LYMPHOCYTES NFR BLD AUTO: 13.4 %
MAN DIFF?: NORMAL
MCHC RBC-ENTMCNC: 30.9 GM/DL
MCHC RBC-ENTMCNC: 32 PG
MCV RBC AUTO: 103.7 FL
MONOCYTES # BLD AUTO: 0.79 K/UL
MONOCYTES NFR BLD AUTO: 9.8 %
NEUTROPHILS # BLD AUTO: 5.83 K/UL
NEUTROPHILS NFR BLD AUTO: 72.6 %
PLATELET # BLD AUTO: 198 K/UL
POTASSIUM SERPL-SCNC: 4.8 MMOL/L
PROT SERPL-MCNC: 6.7 G/DL
PT BLD: 9.8 SEC
RBC # BLD: 4.37 M/UL
RBC # FLD: 13.8 %
SODIUM SERPL-SCNC: 140 MMOL/L
WBC # FLD AUTO: 8.04 K/UL

## 2018-08-08 NOTE — PHYSICAL EXAM
[No Acute Distress] : no acute distress [Well Nourished] : well nourished [Normal Sclera/Conjunctiva] : normal sclera/conjunctiva [PERRL] : pupils equal round and reactive to light [Normal Outer Ear/Nose] : the outer ears and nose were normal in appearance [Normal Oropharynx] : the oropharynx was normal [Supple] : supple [No Respiratory Distress] : no respiratory distress  [Clear to Auscultation] : lungs were clear to auscultation bilaterally [Normal Rate] : normal rate  [Regular Rhythm] : with a regular rhythm [Soft] : abdomen soft [Non Tender] : non-tender [Normal Posterior Cervical Nodes] : no posterior cervical lymphadenopathy [Normal Anterior Cervical Nodes] : no anterior cervical lymphadenopathy [No Joint Swelling] : no joint swelling [Grossly Normal Strength/Tone] : grossly normal strength/tone [Normal Gait] : normal gait [Coordination Grossly Intact] : coordination grossly intact [No Focal Deficits] : no focal deficits [Normal Affect] : the affect was normal [Normal Insight/Judgement] : insight and judgment were intact [de-identified] : Left hand forefinger- healing laceration. No discharge

## 2018-08-08 NOTE — HISTORY OF PRESENT ILLNESS
[No Pertinent Pulmonary History] : no history of asthma, COPD, sleep apnea, or smoking [No Adverse Anesthesia Reaction] : no adverse anesthesia reaction in self or family member [No Pertinent Cardiac History] : no history of aortic stenosis, atrial fibrillation, coronary artery disease, recent myocardial infarction, or implantable device/pacemaker [Chronic Anticoagulation] : no chronic anticoagulation [Chronic Kidney Disease] : no chronic kidney disease [Diabetes] : no diabetes [FreeTextEntry1] : Cataract surgery OD [FreeTextEntry2] : 08/14/18 [FreeTextEntry3] : Dr Arnold [FreeTextEntry4] : Patient received Cardiac Clearance in May\par Stated he was playing with Daughter's dog and tooth lacerated left hand forefinger.

## 2018-09-05 ENCOUNTER — APPOINTMENT (OUTPATIENT)
Dept: FAMILY MEDICINE | Facility: CLINIC | Age: 78
End: 2018-09-05
Payer: MEDICARE

## 2018-09-05 VITALS
SYSTOLIC BLOOD PRESSURE: 120 MMHG | BODY MASS INDEX: 21.86 KG/M2 | DIASTOLIC BLOOD PRESSURE: 72 MMHG | RESPIRATION RATE: 13 BRPM | HEART RATE: 94 BPM | WEIGHT: 148 LBS | OXYGEN SATURATION: 98 % | TEMPERATURE: 98.1 F

## 2018-09-05 PROCEDURE — 99214 OFFICE O/P EST MOD 30 MIN: CPT | Mod: 25

## 2018-09-05 PROCEDURE — 36415 COLL VENOUS BLD VENIPUNCTURE: CPT

## 2018-09-05 RX ORDER — AMOXICILLIN AND CLAVULANATE POTASSIUM 875; 125 MG/1; MG/1
875-125 TABLET, COATED ORAL
Qty: 20 | Refills: 0 | Status: COMPLETED | COMMUNITY
Start: 2018-08-03 | End: 2018-09-05

## 2018-09-05 NOTE — HISTORY OF PRESENT ILLNESS
[Most Recent A1C: ___] : Most recent A1C was [unfilled] [No episodes] : No hypoglycemic episodes since the last visit. [Does not check] : Patient does not check blood glucose regularly [Understanding of foot care] : Patient expressed understanding of foot care [EyeExamDate] : 98/18

## 2018-09-05 NOTE — REVIEW OF SYSTEMS
[Nasal Discharge] : nasal discharge [Cough] : cough [Joint Pain] : joint pain [Negative] : Heme/Lymph

## 2018-09-05 NOTE — PHYSICAL EXAM
[No Acute Distress] : no acute distress [Well Nourished] : well nourished [Normal Sclera/Conjunctiva] : normal sclera/conjunctiva [PERRL] : pupils equal round and reactive to light [Normal Outer Ear/Nose] : the outer ears and nose were normal in appearance [Normal Oropharynx] : the oropharynx was normal [Supple] : supple [No Lymphadenopathy] : no lymphadenopathy [No Respiratory Distress] : no respiratory distress  [Normal Rate] : normal rate  [Regular Rhythm] : with a regular rhythm [Grossly Normal Strength/Tone] : grossly normal strength/tone [Normal Gait] : normal gait [Coordination Grossly Intact] : coordination grossly intact [No Focal Deficits] : no focal deficits [Normal Affect] : the affect was normal [Normal Insight/Judgement] : insight and judgment were intact

## 2018-09-05 NOTE — COUNSELING
[Healthy eating counseling provided] : healthy eating [Activity counseling provided] : activity [Low Fat Diet] : Low fat diet [Low Salt Diet] : Low salt diet [Walking] : Walking [Sleep ___ hours/day] : Sleep [unfilled] hours/day [Engage in a relaxing activity] : Engage in a relaxing activity

## 2018-09-06 LAB
25(OH)D3 SERPL-MCNC: 44.9 NG/ML
ALBUMIN SERPL ELPH-MCNC: 4.9 G/DL
ALP BLD-CCNC: 60 U/L
ALT SERPL-CCNC: 13 U/L
ANION GAP SERPL CALC-SCNC: 17 MMOL/L
APPEARANCE: CLEAR
AST SERPL-CCNC: 22 U/L
BASOPHILS # BLD AUTO: 0.02 K/UL
BASOPHILS NFR BLD AUTO: 0.3 %
BILIRUB SERPL-MCNC: 0.8 MG/DL
BILIRUBIN URINE: NEGATIVE
BLOOD URINE: NEGATIVE
BUN SERPL-MCNC: 25 MG/DL
CALCIUM SERPL-MCNC: 9.3 MG/DL
CHLORIDE SERPL-SCNC: 104 MMOL/L
CHOLEST SERPL-MCNC: 147 MG/DL
CHOLEST/HDLC SERPL: 2.5 RATIO
CO2 SERPL-SCNC: 23 MMOL/L
COLOR: YELLOW
CREAT SERPL-MCNC: 1.13 MG/DL
CREAT SPEC-SCNC: 97 MG/DL
EOSINOPHIL # BLD AUTO: 0.26 K/UL
EOSINOPHIL NFR BLD AUTO: 3.6 %
GLUCOSE QUALITATIVE U: NEGATIVE MG/DL
GLUCOSE SERPL-MCNC: 112 MG/DL
HBA1C MFR BLD HPLC: 6.5 %
HCT VFR BLD CALC: 45.8 %
HDLC SERPL-MCNC: 58 MG/DL
HGB BLD-MCNC: 15 G/DL
IMM GRANULOCYTES NFR BLD AUTO: 0.3 %
KETONES URINE: NEGATIVE
LDLC SERPL CALC-MCNC: 75 MG/DL
LEUKOCYTE ESTERASE URINE: NEGATIVE
LYMPHOCYTES # BLD AUTO: 1.26 K/UL
LYMPHOCYTES NFR BLD AUTO: 17.6 %
MAN DIFF?: NORMAL
MCHC RBC-ENTMCNC: 32.8 GM/DL
MCHC RBC-ENTMCNC: 34.1 PG
MCV RBC AUTO: 104.1 FL
MICROALBUMIN 24H UR DL<=1MG/L-MCNC: <1.2 MG/DL
MICROALBUMIN/CREAT 24H UR-RTO: NORMAL
MONOCYTES # BLD AUTO: 0.64 K/UL
MONOCYTES NFR BLD AUTO: 8.9 %
NEUTROPHILS # BLD AUTO: 4.96 K/UL
NEUTROPHILS NFR BLD AUTO: 69.3 %
NITRITE URINE: NEGATIVE
PH URINE: 6
PLATELET # BLD AUTO: 193 K/UL
POTASSIUM SERPL-SCNC: 5 MMOL/L
PROT SERPL-MCNC: 7 G/DL
PROTEIN URINE: NEGATIVE MG/DL
RBC # BLD: 4.4 M/UL
RBC # FLD: 13.9 %
SODIUM SERPL-SCNC: 144 MMOL/L
SPECIFIC GRAVITY URINE: 1.02
TRIGL SERPL-MCNC: 72 MG/DL
TSH SERPL-ACNC: 2.07 UIU/ML
UROBILINOGEN URINE: NEGATIVE MG/DL
WBC # FLD AUTO: 7.16 K/UL

## 2018-09-12 ENCOUNTER — RESULT REVIEW (OUTPATIENT)
Age: 78
End: 2018-09-12

## 2018-11-06 ENCOUNTER — APPOINTMENT (OUTPATIENT)
Dept: FAMILY MEDICINE | Facility: CLINIC | Age: 78
End: 2018-11-06
Payer: MEDICARE

## 2018-11-06 VITALS
HEART RATE: 111 BPM | TEMPERATURE: 100.9 F | WEIGHT: 148 LBS | SYSTOLIC BLOOD PRESSURE: 128 MMHG | HEIGHT: 69 IN | DIASTOLIC BLOOD PRESSURE: 74 MMHG | OXYGEN SATURATION: 96 % | BODY MASS INDEX: 21.92 KG/M2 | RESPIRATION RATE: 13 BRPM

## 2018-11-06 PROCEDURE — 99214 OFFICE O/P EST MOD 30 MIN: CPT | Mod: 25

## 2018-11-06 PROCEDURE — 94010 BREATHING CAPACITY TEST: CPT | Mod: 59

## 2018-11-06 RX ORDER — BENZONATATE 150 MG/1
150 CAPSULE ORAL TWICE DAILY
Qty: 20 | Refills: 0 | Status: COMPLETED | COMMUNITY
Start: 2018-11-06 | End: 2018-11-06

## 2018-11-06 NOTE — REVIEW OF SYSTEMS
[Fatigue] : fatigue [Nasal Discharge] : nasal discharge [Sore Throat] : sore throat [Cough] : cough [Negative] : Heme/Lymph

## 2018-11-06 NOTE — COUNSELING
[Healthy eating counseling provided] : healthy eating [Behavioral health counseling provided] : behavioral health  [Low Fat Diet] : Low fat diet [Low Salt Diet] : Low salt diet

## 2018-11-07 NOTE — HISTORY OF PRESENT ILLNESS
[FreeTextEntry8] : Patient presents with C/O productive cough, sinus congestion, sore throat and fever x 2 days. Wife has been ill\par Diet is good and drinking little water daily. Compliant with medications

## 2018-11-07 NOTE — PLAN
[FreeTextEntry1] : Advised to continue medications as directed. Life style and diet modifications were discussed.\par Will start antibiotic therapy. Supportive care was reviewed

## 2018-11-07 NOTE — HEALTH RISK ASSESSMENT
[No falls in past year] : Patient reported no falls in the past year [0] : 2) Feeling down, depressed, or hopeless: Not at all (0) [Patient declined colonoscopy] : Patient declined colonoscopy [HIV test declined] : HIV test declined [Hepatitis C test declined] : Hepatitis C test declined [] : No

## 2018-11-13 ENCOUNTER — APPOINTMENT (OUTPATIENT)
Dept: FAMILY MEDICINE | Facility: CLINIC | Age: 78
End: 2018-11-13
Payer: MEDICARE

## 2018-11-13 VITALS
RESPIRATION RATE: 13 BRPM | HEART RATE: 100 BPM | HEIGHT: 69 IN | DIASTOLIC BLOOD PRESSURE: 76 MMHG | WEIGHT: 152 LBS | TEMPERATURE: 98.8 F | SYSTOLIC BLOOD PRESSURE: 158 MMHG | OXYGEN SATURATION: 97 % | BODY MASS INDEX: 22.51 KG/M2

## 2018-11-13 PROCEDURE — 99214 OFFICE O/P EST MOD 30 MIN: CPT | Mod: 25

## 2018-11-13 PROCEDURE — 36415 COLL VENOUS BLD VENIPUNCTURE: CPT

## 2018-11-14 LAB
ALBUMIN SERPL ELPH-MCNC: 4.3 G/DL
ALP BLD-CCNC: 64 U/L
ALT SERPL-CCNC: 15 U/L
ANION GAP SERPL CALC-SCNC: 16 MMOL/L
AST SERPL-CCNC: 22 U/L
BASOPHILS # BLD AUTO: 0.09 K/UL
BASOPHILS NFR BLD AUTO: 1 %
BILIRUB SERPL-MCNC: 0.2 MG/DL
BUN SERPL-MCNC: 21 MG/DL
CALCIUM SERPL-MCNC: 9.2 MG/DL
CHLORIDE SERPL-SCNC: 103 MMOL/L
CO2 SERPL-SCNC: 24 MMOL/L
CREAT SERPL-MCNC: 1.01 MG/DL
EOSINOPHIL # BLD AUTO: 0.58 K/UL
EOSINOPHIL NFR BLD AUTO: 6.6 %
GLUCOSE SERPL-MCNC: 134 MG/DL
HCT VFR BLD CALC: 42.4 %
HGB BLD-MCNC: 13.5 G/DL
IMM GRANULOCYTES NFR BLD AUTO: 2.2 %
LYMPHOCYTES # BLD AUTO: 1.32 K/UL
LYMPHOCYTES NFR BLD AUTO: 15 %
MAN DIFF?: NORMAL
MCHC RBC-ENTMCNC: 31.8 GM/DL
MCHC RBC-ENTMCNC: 33 PG
MCV RBC AUTO: 103.7 FL
MONOCYTES # BLD AUTO: 1.02 K/UL
MONOCYTES NFR BLD AUTO: 11.6 %
NEUTROPHILS # BLD AUTO: 5.58 K/UL
NEUTROPHILS NFR BLD AUTO: 63.6 %
PLATELET # BLD AUTO: 262 K/UL
POTASSIUM SERPL-SCNC: 4.6 MMOL/L
PROT SERPL-MCNC: 6.7 G/DL
RBC # BLD: 4.09 M/UL
RBC # FLD: 13 %
SODIUM SERPL-SCNC: 143 MMOL/L
WBC # FLD AUTO: 8.78 K/UL

## 2018-11-14 RX ORDER — AMOXICILLIN AND CLAVULANATE POTASSIUM 875; 125 MG/1; MG/1
875-125 TABLET, COATED ORAL
Qty: 14 | Refills: 0 | Status: COMPLETED | COMMUNITY
Start: 2018-11-06 | End: 2018-11-14

## 2018-11-14 NOTE — REVIEW OF SYSTEMS
[Fatigue] : fatigue [Nasal Discharge] : nasal discharge [Cough] : cough [Muscle Pain] : muscle pain [Negative] : Heme/Lymph

## 2018-11-14 NOTE — HISTORY OF PRESENT ILLNESS
[de-identified] : Patient presents for follow up of URI. Still has productive cough, body aches and sinus congestion. Stated wife was diagnosed with Pneumonia.\par Diet is good and drinking water daily. Compliant with medications.

## 2018-11-14 NOTE — PLAN
[FreeTextEntry1] : Advised to continue medications as directed. Life style and diet modifications were reviewed.\par Will review testing results when available. Supportive care was discussed

## 2018-11-14 NOTE — PHYSICAL EXAM
[No Acute Distress] : no acute distress [Well Nourished] : well nourished [Normal Sclera/Conjunctiva] : normal sclera/conjunctiva [PERRL] : pupils equal round and reactive to light [Normal Outer Ear/Nose] : the outer ears and nose were normal in appearance [Normal Oropharynx] : the oropharynx was normal [Supple] : supple [Normal Rate] : normal rate  [Regular Rhythm] : with a regular rhythm [Normal S1, S2] : normal S1 and S2 [Grossly Normal Strength/Tone] : grossly normal strength/tone [Normal Gait] : normal gait [Coordination Grossly Intact] : coordination grossly intact [Normal Affect] : the affect was normal [Normal Insight/Judgement] : insight and judgment were intact [de-identified] : Bilateral wheeze

## 2018-11-14 NOTE — COUNSELING
Labs still abnormal elevation in glucose   [Healthy eating counseling provided] : healthy eating [Behavioral health counseling provided] : behavioral health

## 2018-11-27 ENCOUNTER — APPOINTMENT (OUTPATIENT)
Dept: FAMILY MEDICINE | Facility: CLINIC | Age: 78
End: 2018-11-27
Payer: MEDICARE

## 2018-11-27 VITALS
HEIGHT: 69 IN | DIASTOLIC BLOOD PRESSURE: 78 MMHG | OXYGEN SATURATION: 98 % | WEIGHT: 152 LBS | BODY MASS INDEX: 22.51 KG/M2 | TEMPERATURE: 98.1 F | HEART RATE: 75 BPM | RESPIRATION RATE: 13 BRPM | SYSTOLIC BLOOD PRESSURE: 110 MMHG

## 2018-11-27 PROCEDURE — 99214 OFFICE O/P EST MOD 30 MIN: CPT | Mod: 25

## 2018-11-27 PROCEDURE — 36415 COLL VENOUS BLD VENIPUNCTURE: CPT

## 2018-11-27 NOTE — HISTORY OF PRESENT ILLNESS
[de-identified] : Patient presents for follow up of Pneumonia. Feeling better. Still has post nasal drip.\par Diet is good and drinking water daily. Compliant with medications.

## 2018-11-27 NOTE — PHYSICAL EXAM
[No Acute Distress] : no acute distress [Well Nourished] : well nourished [Well Developed] : well developed [Normal Sclera/Conjunctiva] : normal sclera/conjunctiva [PERRL] : pupils equal round and reactive to light [Normal Outer Ear/Nose] : the outer ears and nose were normal in appearance [Normal Oropharynx] : the oropharynx was normal [No Lymphadenopathy] : no lymphadenopathy [No Respiratory Distress] : no respiratory distress  [Clear to Auscultation] : lungs were clear to auscultation bilaterally [No Accessory Muscle Use] : no accessory muscle use [Normal Rate] : normal rate  [Regular Rhythm] : with a regular rhythm [Normal S1, S2] : normal S1 and S2 [Normal Anterior Cervical Nodes] : no anterior cervical lymphadenopathy [Grossly Normal Strength/Tone] : grossly normal strength/tone [Normal Gait] : normal gait [Coordination Grossly Intact] : coordination grossly intact [No Focal Deficits] : no focal deficits

## 2018-11-28 LAB
ALBUMIN SERPL ELPH-MCNC: 4.5 G/DL
ALP BLD-CCNC: 54 U/L
ALT SERPL-CCNC: 17 U/L
ANION GAP SERPL CALC-SCNC: 11 MMOL/L
AST SERPL-CCNC: 27 U/L
BASOPHILS # BLD AUTO: 0.03 K/UL
BASOPHILS NFR BLD AUTO: 0.4 %
BILIRUB SERPL-MCNC: 0.6 MG/DL
BUN SERPL-MCNC: 28 MG/DL
CALCIUM SERPL-MCNC: 9.7 MG/DL
CHLORIDE SERPL-SCNC: 102 MMOL/L
CO2 SERPL-SCNC: 26 MMOL/L
CREAT SERPL-MCNC: 1.15 MG/DL
EOSINOPHIL # BLD AUTO: 0.35 K/UL
EOSINOPHIL NFR BLD AUTO: 4.6 %
GLUCOSE SERPL-MCNC: 127 MG/DL
HCT VFR BLD CALC: 42.7 %
HGB BLD-MCNC: 14.1 G/DL
IMM GRANULOCYTES NFR BLD AUTO: 0.5 %
LYMPHOCYTES # BLD AUTO: 1.51 K/UL
LYMPHOCYTES NFR BLD AUTO: 19.6 %
MAN DIFF?: NORMAL
MCHC RBC-ENTMCNC: 32.8 PG
MCHC RBC-ENTMCNC: 33 GM/DL
MCV RBC AUTO: 99.3 FL
MONOCYTES # BLD AUTO: 0.79 K/UL
MONOCYTES NFR BLD AUTO: 10.3 %
NEUTROPHILS # BLD AUTO: 4.97 K/UL
NEUTROPHILS NFR BLD AUTO: 64.6 %
PLATELET # BLD AUTO: 244 K/UL
POTASSIUM SERPL-SCNC: 5.1 MMOL/L
PROT SERPL-MCNC: 6.8 G/DL
RBC # BLD: 4.3 M/UL
RBC # FLD: 13.8 %
SODIUM SERPL-SCNC: 139 MMOL/L
WBC # FLD AUTO: 7.69 K/UL

## 2018-11-29 ENCOUNTER — RESULT REVIEW (OUTPATIENT)
Age: 78
End: 2018-11-29

## 2018-12-05 ENCOUNTER — APPOINTMENT (OUTPATIENT)
Dept: FAMILY MEDICINE | Facility: CLINIC | Age: 78
End: 2018-12-05

## 2018-12-11 ENCOUNTER — APPOINTMENT (OUTPATIENT)
Dept: FAMILY MEDICINE | Facility: CLINIC | Age: 78
End: 2018-12-11
Payer: MEDICARE

## 2018-12-11 VITALS
DIASTOLIC BLOOD PRESSURE: 80 MMHG | OXYGEN SATURATION: 97 % | WEIGHT: 152 LBS | TEMPERATURE: 97.5 F | SYSTOLIC BLOOD PRESSURE: 164 MMHG | HEART RATE: 95 BPM | HEIGHT: 69 IN | RESPIRATION RATE: 12 BRPM | BODY MASS INDEX: 22.51 KG/M2

## 2018-12-11 PROCEDURE — 36415 COLL VENOUS BLD VENIPUNCTURE: CPT

## 2018-12-11 PROCEDURE — 99214 OFFICE O/P EST MOD 30 MIN: CPT | Mod: 25

## 2018-12-11 RX ORDER — LEVOFLOXACIN 500 MG/1
500 TABLET, FILM COATED ORAL DAILY
Qty: 10 | Refills: 0 | Status: COMPLETED | COMMUNITY
Start: 2018-11-14 | End: 2018-12-11

## 2018-12-11 RX ORDER — METHYLPREDNISOLONE 4 MG/1
4 TABLET ORAL
Qty: 1 | Refills: 0 | Status: DISCONTINUED | COMMUNITY
Start: 2018-11-14 | End: 2018-12-11

## 2018-12-11 RX ORDER — BENZONATATE 100 MG/1
100 CAPSULE ORAL TWICE DAILY
Qty: 20 | Refills: 0 | Status: COMPLETED | COMMUNITY
Start: 2018-11-06 | End: 2018-12-11

## 2018-12-11 NOTE — PHYSICAL EXAM
[No Acute Distress] : no acute distress [Well Nourished] : well nourished [Normal Sclera/Conjunctiva] : normal sclera/conjunctiva [PERRL] : pupils equal round and reactive to light [Normal Outer Ear/Nose] : the outer ears and nose were normal in appearance [Normal Oropharynx] : the oropharynx was normal [No Respiratory Distress] : no respiratory distress  [Clear to Auscultation] : lungs were clear to auscultation bilaterally [Normal Rate] : normal rate  [Regular Rhythm] : with a regular rhythm [Normal S1, S2] : normal S1 and S2 [Normal Anterior Cervical Nodes] : no anterior cervical lymphadenopathy [Grossly Normal Strength/Tone] : grossly normal strength/tone [Normal Gait] : normal gait [Coordination Grossly Intact] : coordination grossly intact [Normal Affect] : the affect was normal [Normal Insight/Judgement] : insight and judgment were intact

## 2018-12-11 NOTE — HISTORY OF PRESENT ILLNESS
[de-identified] : Patient presents for follow up of health care. Stated still has a little cough and phlegm with post nasal drip.\par Denies sore throat, fever or head ache. \par Diet is good and drinking water daily. Compliant with medications.

## 2018-12-12 LAB
25(OH)D3 SERPL-MCNC: 32.1 NG/ML
ALBUMIN SERPL ELPH-MCNC: 4.7 G/DL
ALP BLD-CCNC: 57 U/L
ALT SERPL-CCNC: 19 U/L
ANION GAP SERPL CALC-SCNC: 14 MMOL/L
AST SERPL-CCNC: 23 U/L
BASOPHILS # BLD AUTO: 0.04 K/UL
BASOPHILS NFR BLD AUTO: 0.6 %
BILIRUB SERPL-MCNC: 0.5 MG/DL
BUN SERPL-MCNC: 20 MG/DL
CALCIUM SERPL-MCNC: 9.6 MG/DL
CHLORIDE SERPL-SCNC: 104 MMOL/L
CHOLEST SERPL-MCNC: 149 MG/DL
CHOLEST/HDLC SERPL: 2.4 RATIO
CO2 SERPL-SCNC: 27 MMOL/L
CREAT SERPL-MCNC: 1.09 MG/DL
EOSINOPHIL # BLD AUTO: 0.43 K/UL
EOSINOPHIL NFR BLD AUTO: 6.5 %
GLUCOSE SERPL-MCNC: 116 MG/DL
HBA1C MFR BLD HPLC: 6.9 %
HCT VFR BLD CALC: 43.4 %
HDLC SERPL-MCNC: 63 MG/DL
HGB BLD-MCNC: 14 G/DL
IMM GRANULOCYTES NFR BLD AUTO: 0.2 %
LDLC SERPL CALC-MCNC: 70 MG/DL
LYMPHOCYTES # BLD AUTO: 1.27 K/UL
LYMPHOCYTES NFR BLD AUTO: 19.3 %
MAN DIFF?: NORMAL
MCHC RBC-ENTMCNC: 32.3 GM/DL
MCHC RBC-ENTMCNC: 33.3 PG
MCV RBC AUTO: 103.3 FL
MONOCYTES # BLD AUTO: 0.72 K/UL
MONOCYTES NFR BLD AUTO: 10.9 %
NEUTROPHILS # BLD AUTO: 4.11 K/UL
NEUTROPHILS NFR BLD AUTO: 62.5 %
PLATELET # BLD AUTO: 163 K/UL
POTASSIUM SERPL-SCNC: 4.8 MMOL/L
PROT SERPL-MCNC: 6.6 G/DL
RBC # BLD: 4.2 M/UL
RBC # FLD: 14 %
SODIUM SERPL-SCNC: 145 MMOL/L
TRIGL SERPL-MCNC: 82 MG/DL
TSH SERPL-ACNC: 2.71 UIU/ML
WBC # FLD AUTO: 6.58 K/UL

## 2018-12-31 ENCOUNTER — APPOINTMENT (OUTPATIENT)
Dept: FAMILY MEDICINE | Facility: CLINIC | Age: 78
End: 2018-12-31
Payer: MEDICARE

## 2018-12-31 VITALS
HEART RATE: 86 BPM | RESPIRATION RATE: 13 BRPM | OXYGEN SATURATION: 97 % | HEIGHT: 69 IN | TEMPERATURE: 98.7 F | DIASTOLIC BLOOD PRESSURE: 87 MMHG | BODY MASS INDEX: 22.51 KG/M2 | WEIGHT: 152 LBS | SYSTOLIC BLOOD PRESSURE: 122 MMHG

## 2018-12-31 DIAGNOSIS — Z78.9 OTHER SPECIFIED HEALTH STATUS: ICD-10-CM

## 2018-12-31 DIAGNOSIS — Z72.3 LACK OF PHYSICAL EXERCISE: ICD-10-CM

## 2018-12-31 DIAGNOSIS — Z87.09 PERSONAL HISTORY OF OTHER DISEASES OF THE RESPIRATORY SYSTEM: ICD-10-CM

## 2018-12-31 DIAGNOSIS — H11.32 CONJUNCTIVAL HEMORRHAGE, LEFT EYE: ICD-10-CM

## 2018-12-31 DIAGNOSIS — R05 COUGH: ICD-10-CM

## 2018-12-31 DIAGNOSIS — Z87.01 PERSONAL HISTORY OF PNEUMONIA (RECURRENT): ICD-10-CM

## 2018-12-31 PROCEDURE — 99214 OFFICE O/P EST MOD 30 MIN: CPT

## 2019-03-11 ENCOUNTER — APPOINTMENT (OUTPATIENT)
Dept: FAMILY MEDICINE | Facility: CLINIC | Age: 79
End: 2019-03-11
Payer: MEDICARE

## 2019-03-11 VITALS
HEIGHT: 69 IN | WEIGHT: 156 LBS | HEART RATE: 115 BPM | OXYGEN SATURATION: 97 % | DIASTOLIC BLOOD PRESSURE: 94 MMHG | SYSTOLIC BLOOD PRESSURE: 182 MMHG | BODY MASS INDEX: 23.11 KG/M2

## 2019-03-11 PROCEDURE — 36415 COLL VENOUS BLD VENIPUNCTURE: CPT

## 2019-03-11 PROCEDURE — 99214 OFFICE O/P EST MOD 30 MIN: CPT | Mod: 25

## 2019-03-11 RX ORDER — OFLOXACIN 3 MG/ML
0.3 SOLUTION/ DROPS OPHTHALMIC
Qty: 5 | Refills: 0 | Status: COMPLETED | COMMUNITY
Start: 2018-05-07 | End: 2019-03-11

## 2019-03-11 RX ORDER — PROMETHAZINE HYDROCHLORIDE AND DEXTROMETHORPHAN HYDROBROMIDE ORAL SOLUTION 15; 6.25 MG/5ML; MG/5ML
6.25-15 SOLUTION ORAL
Qty: 240 | Refills: 1 | Status: DISCONTINUED | COMMUNITY
Start: 2018-12-31 | End: 2019-03-11

## 2019-03-11 RX ORDER — AMOXICILLIN AND CLAVULANATE POTASSIUM 875; 125 MG/1; MG/1
875-125 TABLET, COATED ORAL TWICE DAILY
Qty: 20 | Refills: 2 | Status: DISCONTINUED | COMMUNITY
Start: 2018-12-31 | End: 2019-03-11

## 2019-03-12 NOTE — HISTORY OF PRESENT ILLNESS
[FreeTextEntry1] : Patient is here to follow up of tamiko care. Stated still feeling fatigue due to not sleeping well at night. \par Diet is good and drinking water daily. Compliant with medications

## 2019-03-12 NOTE — PHYSICAL EXAM
[No Acute Distress] : no acute distress [Well Nourished] : well nourished [Normal Sclera/Conjunctiva] : normal sclera/conjunctiva [PERRL] : pupils equal round and reactive to light [Normal Outer Ear/Nose] : the outer ears and nose were normal in appearance [Normal Oropharynx] : the oropharynx was normal [No Respiratory Distress] : no respiratory distress  [Normal Rate] : normal rate  [Normal Posterior Cervical Nodes] : no posterior cervical lymphadenopathy [Normal Anterior Cervical Nodes] : no anterior cervical lymphadenopathy [Grossly Normal Strength/Tone] : grossly normal strength/tone [Normal Gait] : normal gait [Coordination Grossly Intact] : coordination grossly intact [No Focal Deficits] : no focal deficits [Normal Affect] : the affect was normal [Normal Insight/Judgement] : insight and judgment were intact

## 2019-03-13 LAB
25(OH)D3 SERPL-MCNC: 36.4 NG/ML
ALBUMIN SERPL ELPH-MCNC: 4.6 G/DL
ALP BLD-CCNC: 56 U/L
ALT SERPL-CCNC: 15 U/L
ANION GAP SERPL CALC-SCNC: 14 MMOL/L
APPEARANCE: CLEAR
AST SERPL-CCNC: 28 U/L
BASOPHILS # BLD AUTO: 0.05 K/UL
BASOPHILS NFR BLD AUTO: 0.5 %
BILIRUB SERPL-MCNC: 0.6 MG/DL
BILIRUBIN URINE: NEGATIVE
BLOOD URINE: NEGATIVE
BUN SERPL-MCNC: 22 MG/DL
CALCIUM SERPL-MCNC: 10 MG/DL
CHLORIDE SERPL-SCNC: 105 MMOL/L
CHOLEST SERPL-MCNC: 133 MG/DL
CHOLEST/HDLC SERPL: 2.3 RATIO
CO2 SERPL-SCNC: 23 MMOL/L
COLOR: YELLOW
CREAT SERPL-MCNC: 1.05 MG/DL
EOSINOPHIL # BLD AUTO: 0.17 K/UL
EOSINOPHIL NFR BLD AUTO: 1.8 %
GLUCOSE QUALITATIVE U: NEGATIVE
GLUCOSE SERPL-MCNC: 126 MG/DL
HBA1C MFR BLD HPLC: 7.5 %
HCT VFR BLD CALC: 44.7 %
HDLC SERPL-MCNC: 58 MG/DL
HGB BLD-MCNC: 14.3 G/DL
IMM GRANULOCYTES NFR BLD AUTO: 0.3 %
KETONES URINE: NEGATIVE
LDLC SERPL CALC-MCNC: 61 MG/DL
LEUKOCYTE ESTERASE URINE: NEGATIVE
LYMPHOCYTES # BLD AUTO: 1.6 K/UL
LYMPHOCYTES NFR BLD AUTO: 17.2 %
MAN DIFF?: NORMAL
MCHC RBC-ENTMCNC: 32 GM/DL
MCHC RBC-ENTMCNC: 33.1 PG
MCV RBC AUTO: 103.5 FL
MONOCYTES # BLD AUTO: 0.91 K/UL
MONOCYTES NFR BLD AUTO: 9.8 %
NEUTROPHILS # BLD AUTO: 6.55 K/UL
NEUTROPHILS NFR BLD AUTO: 70.4 %
NITRITE URINE: NEGATIVE
PH URINE: 6
PLATELET # BLD AUTO: 196 K/UL
POTASSIUM SERPL-SCNC: 4.5 MMOL/L
PROT SERPL-MCNC: 7.2 G/DL
PROTEIN URINE: NORMAL
RBC # BLD: 4.32 M/UL
RBC # FLD: 12.6 %
SODIUM SERPL-SCNC: 142 MMOL/L
SPECIFIC GRAVITY URINE: 1.02
TRIGL SERPL-MCNC: 71 MG/DL
UROBILINOGEN URINE: NORMAL
WBC # FLD AUTO: 9.31 K/UL

## 2019-03-14 ENCOUNTER — RX RENEWAL (OUTPATIENT)
Age: 79
End: 2019-03-14

## 2019-03-26 ENCOUNTER — APPOINTMENT (OUTPATIENT)
Dept: FAMILY MEDICINE | Facility: CLINIC | Age: 79
End: 2019-03-26
Payer: MEDICARE

## 2019-03-26 VITALS
TEMPERATURE: 98.2 F | DIASTOLIC BLOOD PRESSURE: 90 MMHG | SYSTOLIC BLOOD PRESSURE: 180 MMHG | OXYGEN SATURATION: 97 % | WEIGHT: 156 LBS | HEART RATE: 110 BPM | HEIGHT: 69 IN | BODY MASS INDEX: 23.11 KG/M2

## 2019-03-26 PROCEDURE — 99214 OFFICE O/P EST MOD 30 MIN: CPT

## 2019-03-26 RX ORDER — LOTEPREDNOL ETABONATE 5 MG/G
0.5 GEL OPHTHALMIC
Qty: 5 | Refills: 0 | Status: DISCONTINUED | COMMUNITY
Start: 2018-05-08 | End: 2019-03-26

## 2019-03-26 RX ORDER — BROMFENAC SODIUM 0.7 MG/ML
0.07 SOLUTION/ DROPS OPHTHALMIC
Qty: 3 | Refills: 0 | Status: DISCONTINUED | COMMUNITY
Start: 2018-05-08 | End: 2019-03-26

## 2019-03-26 RX ORDER — ALBUTEROL SULFATE 108 UG/1
108 (90 BASE) AEROSOL, METERED RESPIRATORY (INHALATION) EVERY 6 HOURS
Qty: 1 | Refills: 2 | Status: DISCONTINUED | COMMUNITY
Start: 2018-06-05 | End: 2019-03-26

## 2019-03-26 NOTE — REVIEW OF SYSTEMS
[Fatigue] : fatigue [Nasal Discharge] : nasal discharge [Sore Throat] : sore throat [Hoarseness] : hoarseness [Joint Pain] : joint pain [Negative] : Heme/Lymph

## 2019-03-26 NOTE — PLAN
[FreeTextEntry1] : Advised to continue medications as directed. Life style and diet modifications were discussed.\par Will begin antibiotic therapy. Supportive care was reviewed

## 2019-03-26 NOTE — PHYSICAL EXAM
[No Acute Distress] : no acute distress [Well Nourished] : well nourished [Well Developed] : well developed [Normal Sclera/Conjunctiva] : normal sclera/conjunctiva [PERRL] : pupils equal round and reactive to light [EOMI] : extraocular movements intact [Normal Outer Ear/Nose] : the outer ears and nose were normal in appearance [Normal Oropharynx] : the oropharynx was normal [Supple] : supple [No Respiratory Distress] : no respiratory distress  [Clear to Auscultation] : lungs were clear to auscultation bilaterally [Normal Rate] : normal rate  [Regular Rhythm] : with a regular rhythm [Normal S1, S2] : normal S1 and S2 [Normal Anterior Cervical Nodes] : no anterior cervical lymphadenopathy

## 2019-03-26 NOTE — HISTORY OF PRESENT ILLNESS
[FreeTextEntry8] : Patient c/o sore throat x 2 weeks and then developed sinus congestion and PND. Denies fever,N/V/D or head ache.\par Diet is fair and drinking little water daily. Compliant with medications

## 2019-04-02 ENCOUNTER — APPOINTMENT (OUTPATIENT)
Dept: FAMILY MEDICINE | Facility: CLINIC | Age: 79
End: 2019-04-02
Payer: MEDICARE

## 2019-04-02 VITALS
OXYGEN SATURATION: 99 % | TEMPERATURE: 97.1 F | HEIGHT: 69 IN | HEART RATE: 94 BPM | SYSTOLIC BLOOD PRESSURE: 154 MMHG | RESPIRATION RATE: 14 BRPM | BODY MASS INDEX: 23.11 KG/M2 | WEIGHT: 156 LBS | DIASTOLIC BLOOD PRESSURE: 80 MMHG

## 2019-04-02 PROCEDURE — 99214 OFFICE O/P EST MOD 30 MIN: CPT | Mod: 25

## 2019-04-02 PROCEDURE — 69210 REMOVE IMPACTED EAR WAX UNI: CPT

## 2019-04-02 RX ORDER — AMOXICILLIN AND CLAVULANATE POTASSIUM 875; 125 MG/1; MG/1
875-125 TABLET, COATED ORAL
Qty: 14 | Refills: 0 | Status: DISCONTINUED | COMMUNITY
Start: 2019-03-26 | End: 2019-04-02

## 2019-04-02 NOTE — HISTORY OF PRESENT ILLNESS
[FreeTextEntry1] : Pt is here for follow up pharyngitis and URI.  Pt took last ABX today, pt is feeling much better.  Pt was to follow up and check ears bilateral  irrigation of cerumen\par Diet is improving and drinking little water daily. Compliant with medications.

## 2019-04-02 NOTE — PHYSICAL EXAM
[No Acute Distress] : no acute distress [Well Nourished] : well nourished [Well Developed] : well developed [Normal Sclera/Conjunctiva] : normal sclera/conjunctiva [Normal Outer Ear/Nose] : the outer ears and nose were normal in appearance [Normal Oropharynx] : the oropharynx was normal [Supple] : supple [No Respiratory Distress] : no respiratory distress  [Clear to Auscultation] : lungs were clear to auscultation bilaterally [Normal Rate] : normal rate  [Regular Rhythm] : with a regular rhythm [Normal S1, S2] : normal S1 and S2 [Normal Anterior Cervical Nodes] : no anterior cervical lymphadenopathy [Grossly Normal Strength/Tone] : grossly normal strength/tone [Normal Gait] : normal gait [Coordination Grossly Intact] : coordination grossly intact [No Focal Deficits] : no focal deficits [Normal Affect] : the affect was normal [Normal Insight/Judgement] : insight and judgment were intact [de-identified] : increased ceruman

## 2019-04-02 NOTE — PLAN
[FreeTextEntry1] : Advised to continue medications as directed. Life style and diet modifications were discussed.\par Removal of cerumen with good results.

## 2019-06-20 ENCOUNTER — APPOINTMENT (OUTPATIENT)
Dept: FAMILY MEDICINE | Facility: CLINIC | Age: 79
End: 2019-06-20
Payer: MEDICARE

## 2019-06-20 VITALS
WEIGHT: 147 LBS | RESPIRATION RATE: 14 BRPM | HEART RATE: 81 BPM | OXYGEN SATURATION: 99 % | TEMPERATURE: 97.9 F | SYSTOLIC BLOOD PRESSURE: 140 MMHG | HEIGHT: 69 IN | BODY MASS INDEX: 21.77 KG/M2 | DIASTOLIC BLOOD PRESSURE: 82 MMHG

## 2019-06-20 PROCEDURE — 36415 COLL VENOUS BLD VENIPUNCTURE: CPT

## 2019-06-20 PROCEDURE — 99214 OFFICE O/P EST MOD 30 MIN: CPT | Mod: 25

## 2019-06-20 NOTE — PHYSICAL EXAM
[No Acute Distress] : no acute distress [Well Nourished] : well nourished [Well Developed] : well developed [Well-Appearing] : well-appearing [Normal Sclera/Conjunctiva] : normal sclera/conjunctiva [PERRL] : pupils equal round and reactive to light [EOMI] : extraocular movements intact [Normal Outer Ear/Nose] : the outer ears and nose were normal in appearance [Normal Oropharynx] : the oropharynx was normal [Supple] : supple [No Lymphadenopathy] : no lymphadenopathy [No Respiratory Distress] : no respiratory distress  [No Accessory Muscle Use] : no accessory muscle use [Clear to Auscultation] : lungs were clear to auscultation bilaterally [Normal Rate] : normal rate  [Regular Rhythm] : with a regular rhythm [Normal S1, S2] : normal S1 and S2 [No Murmur] : no murmur heard [Normal Posterior Cervical Nodes] : no posterior cervical lymphadenopathy [Normal Anterior Cervical Nodes] : no anterior cervical lymphadenopathy [No Rash] : no rash [Normal Gait] : normal gait [Grossly Normal Strength/Tone] : grossly normal strength/tone [Coordination Grossly Intact] : coordination grossly intact [Normal Affect] : the affect was normal [No Focal Deficits] : no focal deficits [Normal Insight/Judgement] : insight and judgment were intact

## 2019-06-21 LAB
25(OH)D3 SERPL-MCNC: 44.4 NG/ML
ALBUMIN SERPL ELPH-MCNC: 4.6 G/DL
ALP BLD-CCNC: 53 U/L
ALT SERPL-CCNC: 14 U/L
ANION GAP SERPL CALC-SCNC: 15 MMOL/L
APPEARANCE: CLEAR
AST SERPL-CCNC: 22 U/L
BASOPHILS # BLD AUTO: 0.06 K/UL
BASOPHILS NFR BLD AUTO: 0.8 %
BILIRUB SERPL-MCNC: 0.7 MG/DL
BILIRUBIN URINE: NEGATIVE
BLOOD URINE: NEGATIVE
BUN SERPL-MCNC: 25 MG/DL
CALCIUM SERPL-MCNC: 9.9 MG/DL
CHLORIDE SERPL-SCNC: 105 MMOL/L
CHOLEST SERPL-MCNC: 128 MG/DL
CHOLEST/HDLC SERPL: 2.5 RATIO
CO2 SERPL-SCNC: 22 MMOL/L
COLOR: NORMAL
CREAT SERPL-MCNC: 1.08 MG/DL
EOSINOPHIL # BLD AUTO: 0.8 K/UL
EOSINOPHIL NFR BLD AUTO: 11 %
ESTIMATED AVERAGE GLUCOSE: 146 MG/DL
GLUCOSE QUALITATIVE U: NEGATIVE
GLUCOSE SERPL-MCNC: 129 MG/DL
HBA1C MFR BLD HPLC: 6.7 %
HCT VFR BLD CALC: 45 %
HDLC SERPL-MCNC: 51 MG/DL
HGB BLD-MCNC: 14.3 G/DL
IMM GRANULOCYTES NFR BLD AUTO: 0.3 %
KETONES URINE: NEGATIVE
LDLC SERPL CALC-MCNC: 63 MG/DL
LEUKOCYTE ESTERASE URINE: NEGATIVE
LYMPHOCYTES # BLD AUTO: 1.03 K/UL
LYMPHOCYTES NFR BLD AUTO: 14.2 %
MAN DIFF?: NORMAL
MCHC RBC-ENTMCNC: 31.8 GM/DL
MCHC RBC-ENTMCNC: 32.9 PG
MCV RBC AUTO: 103.7 FL
MONOCYTES # BLD AUTO: 0.73 K/UL
MONOCYTES NFR BLD AUTO: 10.1 %
NEUTROPHILS # BLD AUTO: 4.61 K/UL
NEUTROPHILS NFR BLD AUTO: 63.6 %
NITRITE URINE: NEGATIVE
PH URINE: 6
PLATELET # BLD AUTO: 188 K/UL
POTASSIUM SERPL-SCNC: 5 MMOL/L
PROT SERPL-MCNC: 7.1 G/DL
PROTEIN URINE: NEGATIVE
RBC # BLD: 4.34 M/UL
RBC # FLD: 13.2 %
SODIUM SERPL-SCNC: 141 MMOL/L
SPECIFIC GRAVITY URINE: 1.02
TRIGL SERPL-MCNC: 71 MG/DL
TSH SERPL-ACNC: 2.33 UIU/ML
UROBILINOGEN URINE: NORMAL
WBC # FLD AUTO: 7.25 K/UL

## 2019-06-21 NOTE — HISTORY OF PRESENT ILLNESS
[FreeTextEntry1] : Patient at office for medication renewals to [facility]. States he has a productive cough with clear phlegm ongoing x 1 week, "had a coughing spell a couple of nights ago." "I don't really feel that great." Denies fever, using [place] Cough Drops as needed. Denies SOB, sore throat or abdominal issues. [de-identified] : Diet is good and drinking water daily.Compliant w/medications.

## 2019-06-21 NOTE — PLAN
[FreeTextEntry1] : Advised to continue medications as directed. Life style and diet modifications were reviewed.\par Patient will try a trial of Xyzal for AR. Supportive care was reviewed

## 2019-07-01 ENCOUNTER — RESULT REVIEW (OUTPATIENT)
Age: 79
End: 2019-07-01

## 2019-07-12 ENCOUNTER — APPOINTMENT (OUTPATIENT)
Dept: FAMILY MEDICINE | Facility: CLINIC | Age: 79
End: 2019-07-12
Payer: MEDICARE

## 2019-07-12 VITALS
DIASTOLIC BLOOD PRESSURE: 90 MMHG | TEMPERATURE: 97.7 F | BODY MASS INDEX: 22.07 KG/M2 | OXYGEN SATURATION: 96 % | HEIGHT: 69 IN | WEIGHT: 149 LBS | SYSTOLIC BLOOD PRESSURE: 180 MMHG | HEART RATE: 89 BPM

## 2019-07-12 PROCEDURE — 99214 OFFICE O/P EST MOD 30 MIN: CPT

## 2019-07-12 NOTE — PLAN
[FreeTextEntry1] : Advised to continue medications as directed. Life style and diet modifications were discussed.\par Will start antibiotic therapy. Supportive care was discussed.

## 2019-07-12 NOTE — COUNSELING
[Healthy eating counseling provided] : healthy eating [Weight management counseling provided] : Weight management [None] : None [Activity counseling provided] : activity [Behavioral health counseling provided] : behavioral health

## 2019-07-12 NOTE — HISTORY OF PRESENT ILLNESS
[FreeTextEntry8] : Pt states for the past couple of weeks he has been feeling sick. Pt complains of congested  cough. Sinus congestion w/slight head ache since this morning. Denies fever,N/V/D or sore throat. No recent travel or sick exposure.\par Diet is good and drinking water daily. Compliant w/medications.

## 2019-07-12 NOTE — PHYSICAL EXAM
[No Acute Distress] : no acute distress [Well Nourished] : well nourished [PERRL] : pupils equal round and reactive to light [Normal Sclera/Conjunctiva] : normal sclera/conjunctiva [Normal Outer Ear/Nose] : the outer ears and nose were normal in appearance [Normal Rate] : normal rate  [Normal Oropharynx] : the oropharynx was normal [No Respiratory Distress] : no respiratory distress  [Regular Rhythm] : with a regular rhythm [Normal S1, S2] : normal S1 and S2 [Normal Anterior Cervical Nodes] : no anterior cervical lymphadenopathy [Normal Affect] : the affect was normal [Normal Gait] : normal gait [Coordination Grossly Intact] : coordination grossly intact [Normal Insight/Judgement] : insight and judgment were intact

## 2019-07-15 ENCOUNTER — RX RENEWAL (OUTPATIENT)
Age: 79
End: 2019-07-15

## 2019-09-20 ENCOUNTER — APPOINTMENT (OUTPATIENT)
Dept: FAMILY MEDICINE | Facility: CLINIC | Age: 79
End: 2019-09-20
Payer: MEDICARE

## 2019-09-20 VITALS
HEIGHT: 69 IN | SYSTOLIC BLOOD PRESSURE: 140 MMHG | HEART RATE: 88 BPM | OXYGEN SATURATION: 98 % | TEMPERATURE: 97.8 F | RESPIRATION RATE: 14 BRPM | DIASTOLIC BLOOD PRESSURE: 76 MMHG | BODY MASS INDEX: 22.22 KG/M2 | WEIGHT: 150 LBS

## 2019-09-20 DIAGNOSIS — Z87.2 PERSONAL HISTORY OF DISEASES OF THE SKIN AND SUBCUTANEOUS TISSUE: ICD-10-CM

## 2019-09-20 DIAGNOSIS — Z87.09 PERSONAL HISTORY OF OTHER DISEASES OF THE RESPIRATORY SYSTEM: ICD-10-CM

## 2019-09-20 DIAGNOSIS — S61.219A LACERATION W/OUT FOREIGN BODY OF UNSPECIFIED FINGER W/OUT DAMAGE TO NAIL, INITIAL ENCOUNTER: ICD-10-CM

## 2019-09-20 DIAGNOSIS — J06.9 ACUTE UPPER RESPIRATORY INFECTION, UNSPECIFIED: ICD-10-CM

## 2019-09-20 DIAGNOSIS — M10.9 GOUT, UNSPECIFIED: ICD-10-CM

## 2019-09-20 DIAGNOSIS — Z87.898 PERSONAL HISTORY OF OTHER SPECIFIED CONDITIONS: ICD-10-CM

## 2019-09-20 DIAGNOSIS — E78.00 PURE HYPERCHOLESTEROLEMIA, UNSPECIFIED: ICD-10-CM

## 2019-09-20 DIAGNOSIS — R82.998 OTHER ABNORMAL FINDINGS IN URINE: ICD-10-CM

## 2019-09-20 DIAGNOSIS — Z92.29 PERSONAL HISTORY OF OTHER DRUG THERAPY: ICD-10-CM

## 2019-09-20 DIAGNOSIS — N63.20 UNSPECIFIED LUMP IN THE LEFT BREAST, UNSPECIFIED QUADRANT: ICD-10-CM

## 2019-09-20 LAB
ALBUMIN SERPL ELPH-MCNC: 4.5 G/DL
ALP BLD-CCNC: 56 U/L
ALT SERPL-CCNC: 23 U/L
ANION GAP SERPL CALC-SCNC: 13 MMOL/L
AST SERPL-CCNC: 32 U/L
BASOPHILS # BLD AUTO: 0.04 K/UL
BASOPHILS NFR BLD AUTO: 0.7 %
BILIRUB SERPL-MCNC: 0.8 MG/DL
BUN SERPL-MCNC: 29 MG/DL
CALCIUM SERPL-MCNC: 9.5 MG/DL
CHLORIDE SERPL-SCNC: 104 MMOL/L
CHOLEST SERPL-MCNC: 131 MG/DL
CHOLEST/HDLC SERPL: 2.5 RATIO
CO2 SERPL-SCNC: 24 MMOL/L
CREAT SERPL-MCNC: 1.08 MG/DL
EOSINOPHIL # BLD AUTO: 0.27 K/UL
EOSINOPHIL NFR BLD AUTO: 4.8 %
ESTIMATED AVERAGE GLUCOSE: 148 MG/DL
GLUCOSE SERPL-MCNC: 115 MG/DL
HBA1C MFR BLD HPLC: 6.8 %
HCT VFR BLD CALC: 45.7 %
HDLC SERPL-MCNC: 52 MG/DL
HGB BLD-MCNC: 14.9 G/DL
IMM GRANULOCYTES NFR BLD AUTO: 0.4 %
LDLC SERPL CALC-MCNC: 68 MG/DL
LYMPHOCYTES # BLD AUTO: 1.01 K/UL
LYMPHOCYTES NFR BLD AUTO: 17.9 %
MAN DIFF?: NORMAL
MCHC RBC-ENTMCNC: 32.6 GM/DL
MCHC RBC-ENTMCNC: 33.6 PG
MCV RBC AUTO: 103.2 FL
MONOCYTES # BLD AUTO: 0.61 K/UL
MONOCYTES NFR BLD AUTO: 10.8 %
NEUTROPHILS # BLD AUTO: 3.7 K/UL
NEUTROPHILS NFR BLD AUTO: 65.4 %
PLATELET # BLD AUTO: 174 K/UL
POTASSIUM SERPL-SCNC: 4.5 MMOL/L
PROT SERPL-MCNC: 7.1 G/DL
PSA SERPL-MCNC: 1.88 NG/ML
RBC # BLD: 4.43 M/UL
RBC # FLD: 12.6 %
SODIUM SERPL-SCNC: 141 MMOL/L
TRIGL SERPL-MCNC: 55 MG/DL
WBC # FLD AUTO: 5.65 K/UL

## 2019-09-20 PROCEDURE — 99214 OFFICE O/P EST MOD 30 MIN: CPT | Mod: 25

## 2019-09-20 PROCEDURE — 36415 COLL VENOUS BLD VENIPUNCTURE: CPT

## 2019-09-20 RX ORDER — BENZONATATE 100 MG/1
100 CAPSULE ORAL TWICE DAILY
Qty: 20 | Refills: 0 | Status: DISCONTINUED | COMMUNITY
Start: 2019-07-12 | End: 2019-09-20

## 2019-09-20 RX ORDER — AMOXICILLIN AND CLAVULANATE POTASSIUM 875; 125 MG/1; MG/1
875-125 TABLET, COATED ORAL
Qty: 14 | Refills: 0 | Status: DISCONTINUED | COMMUNITY
Start: 2019-07-12 | End: 2019-09-20

## 2019-09-22 PROBLEM — Z92.29 HISTORY OF INFLUENZA VACCINATION: Status: RESOLVED | Noted: 2017-10-04 | Resolved: 2019-09-22

## 2019-09-22 PROBLEM — Z87.09 HISTORY OF BRONCHITIS: Status: RESOLVED | Noted: 2018-11-06 | Resolved: 2019-09-22

## 2019-09-22 PROBLEM — Z87.09 HISTORY OF ACUTE SINUSITIS: Status: RESOLVED | Noted: 2017-06-21 | Resolved: 2019-09-22

## 2019-09-22 PROBLEM — M10.9 GOUT, ARTHRITIS: Status: RESOLVED | Noted: 2017-01-10 | Resolved: 2019-09-22

## 2019-09-22 PROBLEM — S61.219A LACERATION OF FINGER: Status: RESOLVED | Noted: 2018-08-03 | Resolved: 2019-09-22

## 2019-09-22 PROBLEM — Z87.09 HISTORY OF ACUTE PHARYNGITIS: Status: RESOLVED | Noted: 2019-03-26 | Resolved: 2019-09-22

## 2019-09-22 PROBLEM — J06.9 URI WITH COUGH AND CONGESTION: Status: RESOLVED | Noted: 2018-12-31 | Resolved: 2019-09-22

## 2019-09-22 PROBLEM — Z87.2 HISTORY OF ABSCESS OF SKIN AND SUBCUTANEOUS TISSUE: Status: RESOLVED | Noted: 2017-04-12 | Resolved: 2019-09-22

## 2019-09-22 PROBLEM — J06.9 URI, ACUTE: Status: RESOLVED | Noted: 2017-06-21 | Resolved: 2019-09-22

## 2019-09-22 PROBLEM — Z87.2 HISTORY OF SEBACEOUS CYST: Status: RESOLVED | Noted: 2017-04-17 | Resolved: 2019-09-22

## 2019-09-22 PROBLEM — Z87.898 HISTORY OF FEVER: Status: RESOLVED | Noted: 2018-11-06 | Resolved: 2019-09-22

## 2019-09-22 PROBLEM — R82.998 URINE LEUKOCYTES: Status: RESOLVED | Noted: 2017-03-09 | Resolved: 2019-09-22

## 2019-09-22 PROBLEM — E78.00 HIGH CHOLESTEROL: Status: RESOLVED | Noted: 2017-04-12 | Resolved: 2019-09-22

## 2019-09-22 NOTE — PHYSICAL EXAM
[No Acute Distress] : no acute distress [Well Developed] : well developed [Well Nourished] : well nourished [PERRL] : pupils equal round and reactive to light [Normal Sclera/Conjunctiva] : normal sclera/conjunctiva [EOMI] : extraocular movements intact [Normal Oropharynx] : the oropharynx was normal [Normal Outer Ear/Nose] : the outer ears and nose were normal in appearance [Supple] : supple [No Respiratory Distress] : no respiratory distress  [Clear to Auscultation] : lungs were clear to auscultation bilaterally [Regular Rhythm] : with a regular rhythm [Normal Rate] : normal rate  [Normal S1, S2] : normal S1 and S2 [Normal Anterior Cervical Nodes] : no anterior cervical lymphadenopathy [Normal Gait] : normal gait [Coordination Grossly Intact] : coordination grossly intact [Normal Affect] : the affect was normal [Normal Insight/Judgement] : insight and judgment were intact

## 2019-09-22 NOTE — HEALTH RISK ASSESSMENT
[Yes] : Yes [2 - 4 times a month (2 pts)] : 2-4 times a month (2 points) [1 or 2 (0 pts)] : 1 or 2 (0 points) [Never (0 pts)] : Never (0 points) [No] : In the past 12 months have you used drugs other than those required for medical reasons? No [No falls in past year] : Patient reported no falls in the past year [] : No [Audit-CScore] : 2

## 2019-09-22 NOTE — HISTORY OF PRESENT ILLNESS
[FreeTextEntry1] : Pt is here for follow up DM, BP check.  Pt needs RX refills.Feels well. Diet is good and drinking water daily.Compliant w/medications

## 2019-11-05 ENCOUNTER — MEDICATION RENEWAL (OUTPATIENT)
Age: 79
End: 2019-11-05

## 2019-12-06 ENCOUNTER — APPOINTMENT (OUTPATIENT)
Dept: FAMILY MEDICINE | Facility: CLINIC | Age: 79
End: 2019-12-06
Payer: MEDICARE

## 2019-12-06 VITALS
WEIGHT: 150 LBS | HEIGHT: 72 IN | TEMPERATURE: 97.6 F | OXYGEN SATURATION: 97 % | SYSTOLIC BLOOD PRESSURE: 162 MMHG | DIASTOLIC BLOOD PRESSURE: 82 MMHG | HEART RATE: 100 BPM | BODY MASS INDEX: 20.32 KG/M2

## 2019-12-06 VITALS — DIASTOLIC BLOOD PRESSURE: 82 MMHG | SYSTOLIC BLOOD PRESSURE: 140 MMHG

## 2019-12-06 DIAGNOSIS — Z78.9 OTHER SPECIFIED HEALTH STATUS: ICD-10-CM

## 2019-12-06 PROCEDURE — 94640 AIRWAY INHALATION TREATMENT: CPT

## 2019-12-06 PROCEDURE — 99214 OFFICE O/P EST MOD 30 MIN: CPT | Mod: 25

## 2019-12-06 NOTE — REVIEW OF SYSTEMS
[Cough] : cough [Fever] : no fever [Earache] : no earache [Wheezing] : wheezing [Sore Throat] : no sore throat [Dyspnea on Exertion] : not dyspnea on exertion [Abdominal Pain] : no abdominal pain

## 2019-12-06 NOTE — HISTORY OF PRESENT ILLNESS
[FreeTextEntry8] : Pt. C/O persistent dry cough. Pt. states "feels like something is down there making me try to cough and the cough gets worse as time goes by".\par Pt. states no fever, no chills, no nausea, no diarrhea.started about 4 days ago, no fever, no vomiting , cough is dry, pt notes no sob, pt has not had any sick contacts. wife has copd but she is fine , pt notes cough keep up a litle,notes no leg swelling ,

## 2019-12-20 ENCOUNTER — APPOINTMENT (OUTPATIENT)
Dept: FAMILY MEDICINE | Facility: CLINIC | Age: 79
End: 2019-12-20
Payer: MEDICARE

## 2019-12-20 VITALS
HEIGHT: 72 IN | OXYGEN SATURATION: 100 % | WEIGHT: 146 LBS | BODY MASS INDEX: 19.77 KG/M2 | SYSTOLIC BLOOD PRESSURE: 138 MMHG | TEMPERATURE: 98 F | DIASTOLIC BLOOD PRESSURE: 80 MMHG | HEART RATE: 92 BPM

## 2019-12-20 VITALS — RESPIRATION RATE: 14 BRPM

## 2019-12-20 PROCEDURE — 99214 OFFICE O/P EST MOD 30 MIN: CPT | Mod: 25

## 2019-12-20 PROCEDURE — 36415 COLL VENOUS BLD VENIPUNCTURE: CPT

## 2019-12-20 RX ORDER — AZITHROMYCIN 250 MG/1
250 TABLET, FILM COATED ORAL
Qty: 9 | Refills: 0 | Status: DISCONTINUED | COMMUNITY
Start: 2019-12-06 | End: 2019-12-20

## 2019-12-20 RX ORDER — BUDESONIDE AND FORMOTEROL FUMARATE DIHYDRATE 160; 4.5 UG/1; UG/1
160-4.5 AEROSOL RESPIRATORY (INHALATION)
Qty: 1 | Refills: 0 | Status: DISCONTINUED | OUTPATIENT
Start: 2019-12-06 | End: 2019-12-20

## 2019-12-20 NOTE — PLAN
[FreeTextEntry1] : Advised to continue medications as directed. Life style and diet modifications were discussed.\par Will review Bw when available

## 2019-12-20 NOTE — PHYSICAL EXAM
[No Acute Distress] : no acute distress [Well Developed] : well developed [Normal Sclera/Conjunctiva] : normal sclera/conjunctiva [Well Nourished] : well nourished [Normal Outer Ear/Nose] : the outer ears and nose were normal in appearance [Normal Oropharynx] : the oropharynx was normal [PERRL] : pupils equal round and reactive to light [Clear to Auscultation] : lungs were clear to auscultation bilaterally [No Respiratory Distress] : no respiratory distress  [Supple] : supple [Normal S1, S2] : normal S1 and S2 [Regular Rhythm] : with a regular rhythm [Normal Rate] : normal rate  [Grossly Normal Strength/Tone] : grossly normal strength/tone [Normal Anterior Cervical Nodes] : no anterior cervical lymphadenopathy [Coordination Grossly Intact] : coordination grossly intact [Normal Gait] : normal gait [Normal Affect] : the affect was normal [Normal Insight/Judgement] : insight and judgment were intact

## 2019-12-20 NOTE — HISTORY OF PRESENT ILLNESS
[FreeTextEntry1] : Pt is here to f/u with health care. Needs refill of medications. Feels better since last visit. Cough has resolved\par Denies SOB,Palp,CP,Fever or Sore throat [de-identified] : Diet is good and drinking some water daily.Compliant w/medications

## 2019-12-21 LAB
25(OH)D3 SERPL-MCNC: 45.4 NG/ML
ALBUMIN SERPL ELPH-MCNC: 4.5 G/DL
ALP BLD-CCNC: 60 U/L
ALT SERPL-CCNC: 15 U/L
ANION GAP SERPL CALC-SCNC: 13 MMOL/L
APPEARANCE: CLEAR
AST SERPL-CCNC: 23 U/L
BASOPHILS # BLD AUTO: 0.04 K/UL
BASOPHILS NFR BLD AUTO: 0.7 %
BILIRUB SERPL-MCNC: 0.9 MG/DL
BILIRUBIN URINE: NEGATIVE
BLOOD URINE: NEGATIVE
BUN SERPL-MCNC: 26 MG/DL
CALCIUM SERPL-MCNC: 9.9 MG/DL
CHLORIDE SERPL-SCNC: 102 MMOL/L
CHOLEST SERPL-MCNC: 145 MG/DL
CHOLEST/HDLC SERPL: 2.4 RATIO
CO2 SERPL-SCNC: 24 MMOL/L
COLOR: NORMAL
CREAT SERPL-MCNC: 1.1 MG/DL
EOSINOPHIL # BLD AUTO: 0.31 K/UL
EOSINOPHIL NFR BLD AUTO: 5.2 %
ESTIMATED AVERAGE GLUCOSE: 134 MG/DL
GLUCOSE QUALITATIVE U: NEGATIVE
GLUCOSE SERPL-MCNC: 127 MG/DL
HBA1C MFR BLD HPLC: 6.3 %
HCT VFR BLD CALC: 45.3 %
HDLC SERPL-MCNC: 60 MG/DL
HGB BLD-MCNC: 14.8 G/DL
IMM GRANULOCYTES NFR BLD AUTO: 0.3 %
KETONES URINE: NEGATIVE
LDLC SERPL CALC-MCNC: 70 MG/DL
LEUKOCYTE ESTERASE URINE: NEGATIVE
LYMPHOCYTES # BLD AUTO: 1.04 K/UL
LYMPHOCYTES NFR BLD AUTO: 17.4 %
MAN DIFF?: NORMAL
MCHC RBC-ENTMCNC: 32.7 GM/DL
MCHC RBC-ENTMCNC: 33.6 PG
MCV RBC AUTO: 102.7 FL
MONOCYTES # BLD AUTO: 0.7 K/UL
MONOCYTES NFR BLD AUTO: 11.7 %
NEUTROPHILS # BLD AUTO: 3.86 K/UL
NEUTROPHILS NFR BLD AUTO: 64.7 %
NITRITE URINE: NEGATIVE
PH URINE: 5.5
PLATELET # BLD AUTO: 166 K/UL
POTASSIUM SERPL-SCNC: 4.4 MMOL/L
PROT SERPL-MCNC: 7 G/DL
PROTEIN URINE: NEGATIVE
RBC # BLD: 4.41 M/UL
RBC # FLD: 12.6 %
SODIUM SERPL-SCNC: 139 MMOL/L
SPECIFIC GRAVITY URINE: 1.02
TRIGL SERPL-MCNC: 77 MG/DL
UROBILINOGEN URINE: NORMAL
WBC # FLD AUTO: 5.97 K/UL

## 2020-03-18 ENCOUNTER — APPOINTMENT (OUTPATIENT)
Dept: FAMILY MEDICINE | Facility: CLINIC | Age: 80
End: 2020-03-18
Payer: MEDICARE

## 2020-03-18 VITALS
HEIGHT: 72 IN | BODY MASS INDEX: 20.86 KG/M2 | OXYGEN SATURATION: 98 % | SYSTOLIC BLOOD PRESSURE: 150 MMHG | DIASTOLIC BLOOD PRESSURE: 90 MMHG | HEART RATE: 110 BPM | TEMPERATURE: 97.9 F | WEIGHT: 154 LBS

## 2020-03-18 VITALS — SYSTOLIC BLOOD PRESSURE: 146 MMHG | DIASTOLIC BLOOD PRESSURE: 90 MMHG

## 2020-03-18 PROCEDURE — 99214 OFFICE O/P EST MOD 30 MIN: CPT

## 2020-03-18 RX ORDER — ALBUTEROL SULFATE 90 UG/1
108 (90 BASE) AEROSOL, METERED RESPIRATORY (INHALATION)
Qty: 1 | Refills: 0 | Status: DISCONTINUED | COMMUNITY
Start: 2019-12-06 | End: 2020-03-18

## 2020-03-18 NOTE — HISTORY OF PRESENT ILLNESS
[FreeTextEntry1] : Pt here for 3 month follow up on DM/ HTN/ hyperlipidemia and rx refills   [de-identified] : Mr. JERMAN MCKEON is a 79 year male with pmh as below, presenting to the office to follow up on DM, HTN, HDL. No acute complaints. Patient reports he got into argument with wife on way here and BP noted to be elevated above his baseline.

## 2020-03-25 ENCOUNTER — APPOINTMENT (OUTPATIENT)
Dept: FAMILY MEDICINE | Facility: CLINIC | Age: 80
End: 2020-03-25

## 2020-06-18 ENCOUNTER — APPOINTMENT (OUTPATIENT)
Dept: FAMILY MEDICINE | Facility: CLINIC | Age: 80
End: 2020-06-18
Payer: MEDICARE

## 2020-06-18 VITALS
BODY MASS INDEX: 20.86 KG/M2 | SYSTOLIC BLOOD PRESSURE: 132 MMHG | TEMPERATURE: 98.2 F | WEIGHT: 154 LBS | HEART RATE: 88 BPM | OXYGEN SATURATION: 98 % | DIASTOLIC BLOOD PRESSURE: 86 MMHG | HEIGHT: 72 IN

## 2020-06-18 PROCEDURE — 99214 OFFICE O/P EST MOD 30 MIN: CPT

## 2020-06-18 NOTE — HISTORY OF PRESENT ILLNESS
[FreeTextEntry1] : Patient presents in office today to follow up on HTN, hyperlipidemia, DM,and allergies. [de-identified] : Patient has no acute complaints, he is doing well.

## 2020-06-18 NOTE — PHYSICAL EXAM
[Well Nourished] : well nourished [Well Developed] : well developed [No Acute Distress] : no acute distress [Well-Appearing] : well-appearing [Normal Sclera/Conjunctiva] : normal sclera/conjunctiva [EOMI] : extraocular movements intact [Supple] : supple [Normal Outer Ear/Nose] : the outer ears and nose were normal in appearance [No Accessory Muscle Use] : no accessory muscle use [No Respiratory Distress] : no respiratory distress  [Regular Rhythm] : with a regular rhythm [Clear to Auscultation] : lungs were clear to auscultation bilaterally [Normal Rate] : normal rate  [Pedal Pulses Present] : the pedal pulses are present [No Murmur] : no murmur heard [Normal S1, S2] : normal S1 and S2 [Non Tender] : non-tender [Soft] : abdomen soft [Non-distended] : non-distended [No HSM] : no HSM [No Masses] : no abdominal mass palpated [Normal Bowel Sounds] : normal bowel sounds [No Spinal Tenderness] : no spinal tenderness [No CVA Tenderness] : no CVA  tenderness [No Rash] : no rash [Grossly Normal Strength/Tone] : grossly normal strength/tone [No Joint Swelling] : no joint swelling [Normal Gait] : normal gait [No Focal Deficits] : no focal deficits [Coordination Grossly Intact] : coordination grossly intact [Normal Insight/Judgement] : insight and judgment were intact [de-identified] : trace pedal edema [Normal Affect] : the affect was normal

## 2020-09-17 ENCOUNTER — APPOINTMENT (OUTPATIENT)
Dept: FAMILY MEDICINE | Facility: CLINIC | Age: 80
End: 2020-09-17
Payer: MEDICARE

## 2020-09-17 VITALS
SYSTOLIC BLOOD PRESSURE: 138 MMHG | OXYGEN SATURATION: 99 % | DIASTOLIC BLOOD PRESSURE: 96 MMHG | BODY MASS INDEX: 21.54 KG/M2 | HEIGHT: 72 IN | HEART RATE: 98 BPM | TEMPERATURE: 97.6 F | WEIGHT: 159 LBS

## 2020-09-17 PROCEDURE — 99214 OFFICE O/P EST MOD 30 MIN: CPT | Mod: 25

## 2020-09-17 PROCEDURE — 36415 COLL VENOUS BLD VENIPUNCTURE: CPT

## 2020-09-18 NOTE — PHYSICAL EXAM
[No Acute Distress] : no acute distress [Well Nourished] : well nourished [Well Developed] : well developed [Well-Appearing] : well-appearing [Normal Sclera/Conjunctiva] : normal sclera/conjunctiva [EOMI] : extraocular movements intact [Normal Outer Ear/Nose] : the outer ears and nose were normal in appearance [Supple] : supple [No Respiratory Distress] : no respiratory distress  [No Accessory Muscle Use] : no accessory muscle use [Clear to Auscultation] : lungs were clear to auscultation bilaterally [Normal Rate] : normal rate  [Regular Rhythm] : with a regular rhythm [Normal S1, S2] : normal S1 and S2 [No Murmur] : no murmur heard [Pedal Pulses Present] : the pedal pulses are present [No Edema] : there was no peripheral edema [Soft] : abdomen soft [Non Tender] : non-tender [Non-distended] : non-distended [No Masses] : no abdominal mass palpated [No HSM] : no HSM [Normal Bowel Sounds] : normal bowel sounds [No CVA Tenderness] : no CVA  tenderness [No Spinal Tenderness] : no spinal tenderness [No Joint Swelling] : no joint swelling [Grossly Normal Strength/Tone] : grossly normal strength/tone [No Rash] : no rash [Coordination Grossly Intact] : coordination grossly intact [No Focal Deficits] : no focal deficits [Normal Gait] : normal gait [Normal Affect] : the affect was normal [Normal Insight/Judgement] : insight and judgment were intact

## 2020-09-18 NOTE — HISTORY OF PRESENT ILLNESS
[FreeTextEntry1] : Patient presents in office today to follow up on type 2 diabetes, HTN, HLD.\par Pt. states he got a flu shot last week and his right arm is still sore but improving. [de-identified] : Mr. JERMAN MCKEON is a 79 year male who presents to office to follow up on HTN, DM, HLD. Patient is also requesting medication for erectile dysfunction. States he was on it some years ago and he is struggling to get an erection and would like to try it. No other complaints.

## 2020-09-21 LAB
ALBUMIN SERPL ELPH-MCNC: 4.9 G/DL
ALP BLD-CCNC: 60 U/L
ALT SERPL-CCNC: 18 U/L
ANION GAP SERPL CALC-SCNC: 16 MMOL/L
AST SERPL-CCNC: 31 U/L
BASOPHILS # BLD AUTO: 0.05 K/UL
BASOPHILS NFR BLD AUTO: 0.7 %
BILIRUB SERPL-MCNC: 1 MG/DL
BUN SERPL-MCNC: 22 MG/DL
CALCIUM SERPL-MCNC: 9.7 MG/DL
CHLORIDE SERPL-SCNC: 103 MMOL/L
CHOLEST SERPL-MCNC: 133 MG/DL
CHOLEST/HDLC SERPL: 2.2 RATIO
CO2 SERPL-SCNC: 23 MMOL/L
CREAT SERPL-MCNC: 1.03 MG/DL
EOSINOPHIL # BLD AUTO: 0.13 K/UL
EOSINOPHIL NFR BLD AUTO: 1.9 %
ESTIMATED AVERAGE GLUCOSE: 148 MG/DL
GLUCOSE SERPL-MCNC: 145 MG/DL
HBA1C MFR BLD HPLC: 6.8 %
HCT VFR BLD CALC: 45.4 %
HDLC SERPL-MCNC: 62 MG/DL
HGB BLD-MCNC: 14.7 G/DL
IMM GRANULOCYTES NFR BLD AUTO: 0.4 %
LDLC SERPL CALC-MCNC: 60 MG/DL
LYMPHOCYTES # BLD AUTO: 1.16 K/UL
LYMPHOCYTES NFR BLD AUTO: 16.7 %
MAN DIFF?: NORMAL
MCHC RBC-ENTMCNC: 32.4 GM/DL
MCHC RBC-ENTMCNC: 33.3 PG
MCV RBC AUTO: 102.7 FL
MONOCYTES # BLD AUTO: 0.81 K/UL
MONOCYTES NFR BLD AUTO: 11.7 %
NEUTROPHILS # BLD AUTO: 4.77 K/UL
NEUTROPHILS NFR BLD AUTO: 68.6 %
PLATELET # BLD AUTO: 162 K/UL
POTASSIUM SERPL-SCNC: 5.1 MMOL/L
PROT SERPL-MCNC: 6.7 G/DL
RBC # BLD: 4.42 M/UL
RBC # FLD: 12.9 %
SODIUM SERPL-SCNC: 142 MMOL/L
TRIGL SERPL-MCNC: 55 MG/DL
TSH SERPL-ACNC: 2.3 UIU/ML
WBC # FLD AUTO: 6.95 K/UL

## 2020-12-07 ENCOUNTER — APPOINTMENT (OUTPATIENT)
Dept: FAMILY MEDICINE | Facility: CLINIC | Age: 80
End: 2020-12-07
Payer: MEDICARE

## 2020-12-07 VITALS
WEIGHT: 152 LBS | HEIGHT: 72 IN | DIASTOLIC BLOOD PRESSURE: 86 MMHG | HEART RATE: 126 BPM | TEMPERATURE: 98.2 F | BODY MASS INDEX: 20.59 KG/M2 | SYSTOLIC BLOOD PRESSURE: 144 MMHG | OXYGEN SATURATION: 97 %

## 2020-12-07 PROCEDURE — 96372 THER/PROPH/DIAG INJ SC/IM: CPT

## 2020-12-07 PROCEDURE — 99213 OFFICE O/P EST LOW 20 MIN: CPT | Mod: 25

## 2020-12-07 PROCEDURE — 99072 ADDL SUPL MATRL&STAF TM PHE: CPT

## 2020-12-07 RX ORDER — DEXAMETHASONE SODIUM PHOSPHATE 4 MG/ML
4 INJECTION, SOLUTION INTRAMUSCULAR; INTRAVENOUS
Qty: 0 | Refills: 0 | Status: COMPLETED | OUTPATIENT
Start: 2020-12-07

## 2020-12-07 RX ADMIN — DEXAMETHASONE SODIUM PHOSPHATE 2 MG/ML: 4 INJECTION, SOLUTION INTRA-ARTICULAR; INTRALESIONAL; INTRAMUSCULAR; INTRAVENOUS; SOFT TISSUE at 00:00

## 2020-12-07 NOTE — PHYSICAL EXAM
[No Acute Distress] : no acute distress [Well Nourished] : well nourished [Well Developed] : well developed [Normal Sclera/Conjunctiva] : normal sclera/conjunctiva [EOMI] : extraocular movements intact [Normal Outer Ear/Nose] : the outer ears and nose were normal in appearance [Supple] : supple [No Respiratory Distress] : no respiratory distress  [No Accessory Muscle Use] : no accessory muscle use [Normal Rate] : normal rate  [No Edema] : there was no peripheral edema [No Rash] : no rash [Coordination Grossly Intact] : coordination grossly intact [No Focal Deficits] : no focal deficits [Normal Gait] : normal gait [Normal Affect] : the affect was normal [Normal Insight/Judgement] : insight and judgment were intact [de-identified] : left should very limited ROM due to pain

## 2020-12-07 NOTE — HISTORY OF PRESENT ILLNESS
[FreeTextEntry8] : Mr. JERMAN MCKEON is a 80 year male who presents to office c/o left shoulder pain and achy x 3-4 days. Pt. states he was raking leaves and lifting them up and believes that’s what happened to his shoulder. Pain started day after the raking and clearing of leaves. No fever or chills.

## 2020-12-16 ENCOUNTER — APPOINTMENT (OUTPATIENT)
Dept: FAMILY MEDICINE | Facility: CLINIC | Age: 80
End: 2020-12-16

## 2021-02-08 ENCOUNTER — APPOINTMENT (OUTPATIENT)
Dept: FAMILY MEDICINE | Facility: CLINIC | Age: 81
End: 2021-02-08

## 2021-02-09 ENCOUNTER — APPOINTMENT (OUTPATIENT)
Dept: FAMILY MEDICINE | Facility: CLINIC | Age: 81
End: 2021-02-09

## 2021-02-11 ENCOUNTER — NON-APPOINTMENT (OUTPATIENT)
Age: 81
End: 2021-02-11

## 2021-02-11 ENCOUNTER — APPOINTMENT (OUTPATIENT)
Dept: FAMILY MEDICINE | Facility: CLINIC | Age: 81
End: 2021-02-11
Payer: MEDICARE

## 2021-02-11 VITALS
DIASTOLIC BLOOD PRESSURE: 80 MMHG | RESPIRATION RATE: 16 BRPM | TEMPERATURE: 97.3 F | BODY MASS INDEX: 20.99 KG/M2 | HEART RATE: 98 BPM | HEIGHT: 72 IN | SYSTOLIC BLOOD PRESSURE: 180 MMHG | OXYGEN SATURATION: 98 % | WEIGHT: 155 LBS

## 2021-02-11 VITALS — SYSTOLIC BLOOD PRESSURE: 150 MMHG | DIASTOLIC BLOOD PRESSURE: 80 MMHG

## 2021-02-11 DIAGNOSIS — H61.23 IMPACTED CERUMEN, BILATERAL: ICD-10-CM

## 2021-02-11 DIAGNOSIS — Z87.39 PERSONAL HISTORY OF OTHER DISEASES OF THE MUSCULOSKELETAL SYSTEM AND CONNECTIVE TISSUE: ICD-10-CM

## 2021-02-11 DIAGNOSIS — Z79.899 OTHER LONG TERM (CURRENT) DRUG THERAPY: ICD-10-CM

## 2021-02-11 PROCEDURE — 99214 OFFICE O/P EST MOD 30 MIN: CPT | Mod: 25

## 2021-02-11 PROCEDURE — 93000 ELECTROCARDIOGRAM COMPLETE: CPT

## 2021-02-11 PROCEDURE — 36415 COLL VENOUS BLD VENIPUNCTURE: CPT

## 2021-02-11 PROCEDURE — 99072 ADDL SUPL MATRL&STAF TM PHE: CPT

## 2021-02-11 RX ORDER — SIMVASTATIN 20 MG/1
20 TABLET, FILM COATED ORAL DAILY
Qty: 90 | Refills: 0 | Status: DISCONTINUED | COMMUNITY
Start: 2021-02-11 | End: 2021-02-11

## 2021-02-11 RX ORDER — METHYLPREDNISOLONE 4 MG/1
4 TABLET ORAL
Qty: 1 | Refills: 0 | Status: DISCONTINUED | COMMUNITY
Start: 2020-12-07 | End: 2021-02-11

## 2021-02-11 NOTE — PHYSICAL EXAM
[No Acute Distress] : no acute distress [Well Nourished] : well nourished [Well Developed] : well developed [Well-Appearing] : well-appearing [Normal Sclera/Conjunctiva] : normal sclera/conjunctiva [EOMI] : extraocular movements intact [Normal Outer Ear/Nose] : the outer ears and nose were normal in appearance [No Lymphadenopathy] : no lymphadenopathy [Supple] : supple [No Respiratory Distress] : no respiratory distress  [No Accessory Muscle Use] : no accessory muscle use [Clear to Auscultation] : lungs were clear to auscultation bilaterally [Normal Rate] : normal rate  [Regular Rhythm] : with a regular rhythm [Normal S1, S2] : normal S1 and S2 [No Murmur] : no murmur heard [Pedal Pulses Present] : the pedal pulses are present [No Edema] : there was no peripheral edema [No Extremity Clubbing/Cyanosis] : no extremity clubbing/cyanosis [Soft] : abdomen soft [Non Tender] : non-tender [Non-distended] : non-distended [No Masses] : no abdominal mass palpated [No HSM] : no HSM [Normal Bowel Sounds] : normal bowel sounds [No CVA Tenderness] : no CVA  tenderness [No Spinal Tenderness] : no spinal tenderness [No Joint Swelling] : no joint swelling [Grossly Normal Strength/Tone] : grossly normal strength/tone [No Rash] : no rash [Coordination Grossly Intact] : coordination grossly intact [No Focal Deficits] : no focal deficits [Normal Gait] : normal gait [Normal Affect] : the affect was normal [Normal Insight/Judgement] : insight and judgment were intact

## 2021-02-11 NOTE — HISTORY OF PRESENT ILLNESS
[FreeTextEntry1] : Patient at office to follow up on blood pressure, DM, ED and cholesterol. Feels well, no complaints at this time. [de-identified] : Mr. JERMAN MCKEON is a 80 year male who presents to office to follow up as listed above. Patient feels well. No recent change in health.

## 2021-02-22 LAB
ALBUMIN SERPL ELPH-MCNC: 4.6 G/DL
ALP BLD-CCNC: 65 U/L
ALT SERPL-CCNC: 16 U/L
ANION GAP SERPL CALC-SCNC: 13 MMOL/L
AST SERPL-CCNC: 22 U/L
BASOPHILS # BLD AUTO: 0.05 K/UL
BASOPHILS NFR BLD AUTO: 0.7 %
BILIRUB SERPL-MCNC: 0.6 MG/DL
BUN SERPL-MCNC: 17 MG/DL
CALCIUM SERPL-MCNC: 9.6 MG/DL
CHLORIDE SERPL-SCNC: 105 MMOL/L
CHOLEST SERPL-MCNC: 141 MG/DL
CO2 SERPL-SCNC: 24 MMOL/L
CREAT SERPL-MCNC: 1.04 MG/DL
EOSINOPHIL # BLD AUTO: 0.11 K/UL
EOSINOPHIL NFR BLD AUTO: 1.5 %
ESTIMATED AVERAGE GLUCOSE: 177 MG/DL
GLUCOSE SERPL-MCNC: 147 MG/DL
HBA1C MFR BLD HPLC: 7.8 %
HCT VFR BLD CALC: 42.8 %
HDLC SERPL-MCNC: 61 MG/DL
HGB BLD-MCNC: 13.9 G/DL
IMM GRANULOCYTES NFR BLD AUTO: 0.4 %
LDLC SERPL CALC-MCNC: 67 MG/DL
LYMPHOCYTES # BLD AUTO: 1.04 K/UL
LYMPHOCYTES NFR BLD AUTO: 14.2 %
MAN DIFF?: NORMAL
MCHC RBC-ENTMCNC: 32.5 GM/DL
MCHC RBC-ENTMCNC: 33.3 PG
MCV RBC AUTO: 102.6 FL
MONOCYTES # BLD AUTO: 0.65 K/UL
MONOCYTES NFR BLD AUTO: 8.9 %
NEUTROPHILS # BLD AUTO: 5.42 K/UL
NEUTROPHILS NFR BLD AUTO: 74.3 %
NONHDLC SERPL-MCNC: 80 MG/DL
PLATELET # BLD AUTO: 170 K/UL
POTASSIUM SERPL-SCNC: 4.5 MMOL/L
PROT SERPL-MCNC: 6.6 G/DL
RBC # BLD: 4.17 M/UL
RBC # FLD: 12.8 %
SODIUM SERPL-SCNC: 141 MMOL/L
TRIGL SERPL-MCNC: 66 MG/DL
TSH SERPL-ACNC: 2.46 UIU/ML
WBC # FLD AUTO: 7.3 K/UL

## 2021-05-04 ENCOUNTER — APPOINTMENT (OUTPATIENT)
Dept: FAMILY MEDICINE | Facility: CLINIC | Age: 81
End: 2021-05-04
Payer: MEDICARE

## 2021-05-04 PROCEDURE — 99441: CPT

## 2021-05-05 NOTE — HISTORY OF PRESENT ILLNESS
[Home] : at home, [unfilled] , at the time of the visit. [Medical Office: ([facility])___] : at the medical office located in  [Verbal consent obtained from patient] : the patient, [unfilled] [Spouse] : spouse [FreeTextEntry8] : Mr. JERMAN MCKEON presents for a telephonic appointment c/o a cough x 3 weeks, productive of clear sputum, no hemoptysis. No fever or chills, had second covid dose on April 19. Also complains of associated phlegm/rattling in chest.\par

## 2021-05-05 NOTE — END OF VISIT
[FreeTextEntry3] : S [FreeTextEntry2] : Start: 1:43pm\par End:   1:50pm [Time Spent: ___ minutes] : I have spent [unfilled] minutes of time on the encounter.

## 2021-05-17 ENCOUNTER — APPOINTMENT (OUTPATIENT)
Dept: FAMILY MEDICINE | Facility: CLINIC | Age: 81
End: 2021-05-17
Payer: MEDICARE

## 2021-05-17 ENCOUNTER — APPOINTMENT (OUTPATIENT)
Dept: RADIOLOGY | Facility: CLINIC | Age: 81
End: 2021-05-17
Payer: MEDICARE

## 2021-05-17 ENCOUNTER — OUTPATIENT (OUTPATIENT)
Dept: OUTPATIENT SERVICES | Facility: HOSPITAL | Age: 81
LOS: 1 days | End: 2021-05-17
Payer: MEDICARE

## 2021-05-17 VITALS
WEIGHT: 153 LBS | HEIGHT: 72 IN | HEART RATE: 100 BPM | TEMPERATURE: 97.4 F | BODY MASS INDEX: 20.72 KG/M2 | DIASTOLIC BLOOD PRESSURE: 94 MMHG | SYSTOLIC BLOOD PRESSURE: 152 MMHG | OXYGEN SATURATION: 98 %

## 2021-05-17 DIAGNOSIS — R05 COUGH: ICD-10-CM

## 2021-05-17 PROCEDURE — 71046 X-RAY EXAM CHEST 2 VIEWS: CPT

## 2021-05-17 PROCEDURE — 36415 COLL VENOUS BLD VENIPUNCTURE: CPT

## 2021-05-17 PROCEDURE — 71046 X-RAY EXAM CHEST 2 VIEWS: CPT | Mod: 26

## 2021-05-17 PROCEDURE — 99072 ADDL SUPL MATRL&STAF TM PHE: CPT

## 2021-05-17 PROCEDURE — 99214 OFFICE O/P EST MOD 30 MIN: CPT | Mod: 25

## 2021-05-17 RX ORDER — GUAIFENESIN 600 MG/1
600 TABLET, EXTENDED RELEASE ORAL
Qty: 14 | Refills: 0 | Status: DISCONTINUED | COMMUNITY
Start: 2021-05-04 | End: 2021-05-17

## 2021-05-17 NOTE — PHYSICAL EXAM
[Normal Sclera/Conjunctiva] : normal sclera/conjunctiva [Normal Outer Ear/Nose] : the outer ears and nose were normal in appearance [Supple] : supple [No Respiratory Distress] : no respiratory distress  [No Accessory Muscle Use] : no accessory muscle use [Clear to Auscultation] : lungs were clear to auscultation bilaterally [Normal Rate] : normal rate  [Regular Rhythm] : with a regular rhythm [Normal S1, S2] : normal S1 and S2 [Pedal Pulses Present] : the pedal pulses are present [No Edema] : there was no peripheral edema [Normal] : soft, non-tender, non-distended, no masses palpated, no HSM and normal bowel sounds [No Spinal Tenderness] : no spinal tenderness [Grossly Normal Strength/Tone] : grossly normal strength/tone [Coordination Grossly Intact] : coordination grossly intact [No Focal Deficits] : no focal deficits [Normal Gait] : normal gait [Normal Affect] : the affect was normal [Normal Insight/Judgement] : insight and judgment were intact [de-identified] : .

## 2021-05-17 NOTE — HISTORY OF PRESENT ILLNESS
[FreeTextEntry1] : Pt is following up on HTN, DM, hyperlipidemia, allergies and bronchitis.\par Pt states he has been using his wife's inhaler and states having relief. [de-identified] : Mr. JERMAN MCKEON is a 80 year male who presents to office to follow up on HTN, HLD, Dm, Allergies, with Ed with medications refills. Patient feels well, however continues to have cough, from last eval. States, it feels like the there is mucous in his chest but it does and when he coughs it is a thin clear sputum. No fever or chills. He is covid-19 vaccine complete. Has not sick contacts.

## 2021-05-24 LAB
ALBUMIN SERPL ELPH-MCNC: 4.8 G/DL
ALP BLD-CCNC: 64 U/L
ALT SERPL-CCNC: 16 U/L
ANION GAP SERPL CALC-SCNC: 15 MMOL/L
AST SERPL-CCNC: 22 U/L
BILIRUB SERPL-MCNC: 0.8 MG/DL
BUN SERPL-MCNC: 20 MG/DL
CALCIUM SERPL-MCNC: 10 MG/DL
CHLORIDE SERPL-SCNC: 107 MMOL/L
CO2 SERPL-SCNC: 23 MMOL/L
CREAT SERPL-MCNC: 1.05 MG/DL
ESTIMATED AVERAGE GLUCOSE: 171 MG/DL
GLUCOSE SERPL-MCNC: 140 MG/DL
HBA1C MFR BLD HPLC: 7.6 %
POTASSIUM SERPL-SCNC: 5 MMOL/L
PROT SERPL-MCNC: 6.9 G/DL
SODIUM SERPL-SCNC: 145 MMOL/L

## 2021-08-03 ENCOUNTER — APPOINTMENT (OUTPATIENT)
Dept: FAMILY MEDICINE | Facility: CLINIC | Age: 81
End: 2021-08-03
Payer: MEDICARE

## 2021-08-03 DIAGNOSIS — R93.89 ABNORMAL FINDINGS ON DIAGNOSTIC IMAGING OF OTHER SPECIFIED BODY STRUCTURES: ICD-10-CM

## 2021-08-03 DIAGNOSIS — Z87.891 PERSONAL HISTORY OF NICOTINE DEPENDENCE: ICD-10-CM

## 2021-08-03 PROCEDURE — ZZZZZ: CPT

## 2021-08-03 PROCEDURE — 99443: CPT

## 2021-08-03 NOTE — HISTORY OF PRESENT ILLNESS
[Home] : at home, [unfilled] , at the time of the visit. [Medical Office: ([facility])___] : at the medical office located in  [Spouse] : spouse [Verbal consent obtained from patient] : the patient, [unfilled] [FreeTextEntry1] : Patient c/o constant cough with little production and lack of sleep x 2 weeks.\par He was fully vaccinated for COVID-19 with Moderna on 3/22 & 4/19. [de-identified] : pt presents for telemed, pt had similar cough  back in may and xray showed r linear demity, pt notes no fever, cough is clear , no pulse [facility] pt notes no sob, no fever , wife feels fine, pt has not had covid swab even w dileep sympoms back in may, pt agreeab;le to swab , will be coming today to cover basis \par pt has dm , sugars are ok, last a1c was 7.6, wife notes pt has been going out shopping without a mask on , pt noted to wheeze last pm

## 2021-08-06 LAB — SARS-COV-2 N GENE NPH QL NAA+PROBE: NOT DETECTED

## 2021-08-12 ENCOUNTER — APPOINTMENT (OUTPATIENT)
Dept: CT IMAGING | Facility: CLINIC | Age: 81
End: 2021-08-12
Payer: MEDICARE

## 2021-08-12 ENCOUNTER — OUTPATIENT (OUTPATIENT)
Dept: OUTPATIENT SERVICES | Facility: HOSPITAL | Age: 81
LOS: 1 days | End: 2021-08-12
Payer: MEDICARE

## 2021-08-12 DIAGNOSIS — R93.89 ABNORMAL FINDINGS ON DIAGNOSTIC IMAGING OF OTHER SPECIFIED BODY STRUCTURES: ICD-10-CM

## 2021-08-12 PROCEDURE — 71250 CT THORAX DX C-: CPT

## 2021-08-12 PROCEDURE — 71250 CT THORAX DX C-: CPT | Mod: 26

## 2021-08-19 ENCOUNTER — APPOINTMENT (OUTPATIENT)
Dept: FAMILY MEDICINE | Facility: CLINIC | Age: 81
End: 2021-08-19

## 2021-08-20 ENCOUNTER — APPOINTMENT (OUTPATIENT)
Dept: FAMILY MEDICINE | Facility: CLINIC | Age: 81
End: 2021-08-20
Payer: MEDICARE

## 2021-08-20 VITALS
HEART RATE: 84 BPM | HEIGHT: 72 IN | WEIGHT: 147 LBS | DIASTOLIC BLOOD PRESSURE: 90 MMHG | TEMPERATURE: 97.6 F | OXYGEN SATURATION: 98 % | SYSTOLIC BLOOD PRESSURE: 146 MMHG | BODY MASS INDEX: 19.91 KG/M2

## 2021-08-20 DIAGNOSIS — M11.20 OTHER CHONDROCALCINOSIS, UNSPECIFIED SITE: ICD-10-CM

## 2021-08-20 LAB
ALBUMIN SERPL ELPH-MCNC: 4.3 G/DL
ALP BLD-CCNC: 103 U/L
ALT SERPL-CCNC: 15 U/L
ANION GAP SERPL CALC-SCNC: 12 MMOL/L
AST SERPL-CCNC: 19 U/L
BILIRUB SERPL-MCNC: 0.7 MG/DL
BUN SERPL-MCNC: 17 MG/DL
CALCIUM SERPL-MCNC: 9.4 MG/DL
CHLORIDE SERPL-SCNC: 107 MMOL/L
CHOLEST SERPL-MCNC: 136 MG/DL
CO2 SERPL-SCNC: 23 MMOL/L
CREAT SERPL-MCNC: 1.02 MG/DL
ESTIMATED AVERAGE GLUCOSE: 177 MG/DL
GLUCOSE SERPL-MCNC: 178 MG/DL
HBA1C MFR BLD HPLC: 7.8 %
HDLC SERPL-MCNC: 56 MG/DL
LDLC SERPL CALC-MCNC: 64 MG/DL
NONHDLC SERPL-MCNC: 80 MG/DL
POTASSIUM SERPL-SCNC: 5.1 MMOL/L
PROT SERPL-MCNC: 6.4 G/DL
SODIUM SERPL-SCNC: 142 MMOL/L
TRIGL SERPL-MCNC: 82 MG/DL
TSH SERPL-ACNC: 2.36 UIU/ML
URATE SERPL-MCNC: 6.1 MG/DL

## 2021-08-20 PROCEDURE — 36415 COLL VENOUS BLD VENIPUNCTURE: CPT

## 2021-08-20 PROCEDURE — 99214 OFFICE O/P EST MOD 30 MIN: CPT | Mod: 25

## 2021-08-20 RX ORDER — TADALAFIL 5 MG/1
5 TABLET ORAL
Qty: 10 | Refills: 1 | Status: DISCONTINUED | COMMUNITY
Start: 2020-09-17 | End: 2021-08-20

## 2021-08-20 NOTE — DATA REVIEWED
[FreeTextEntry1] : Today A1c 7.8 glucose 178 thyroid was good and uric acid was 6.1\par Chest x-ray was negative for pneumonia\par ct chrst shows some r lower nodular opacitires

## 2021-08-20 NOTE — HISTORY OF PRESENT ILLNESS
[FreeTextEntry1] : Patient is following up on DM,  and cough which has resolved, pt came  for swab was neg, pt looking for refills on al his meds, pt notes he  [de-identified] : pt saw dr muñoz,in past , pt has been on simvastatin 60mg , told pt max is 40mg so will decrease and reassess in 3 mos. pt notesno cp, no sob, pt need refills on his meds, and needs blood work

## 2021-10-01 ENCOUNTER — APPOINTMENT (OUTPATIENT)
Dept: FAMILY MEDICINE | Facility: CLINIC | Age: 81
End: 2021-10-01
Payer: MEDICARE

## 2021-10-01 VITALS — SYSTOLIC BLOOD PRESSURE: 170 MMHG | DIASTOLIC BLOOD PRESSURE: 80 MMHG

## 2021-10-01 VITALS
HEIGHT: 72 IN | BODY MASS INDEX: 20.45 KG/M2 | HEART RATE: 111 BPM | WEIGHT: 151 LBS | SYSTOLIC BLOOD PRESSURE: 160 MMHG | RESPIRATION RATE: 16 BRPM | DIASTOLIC BLOOD PRESSURE: 85 MMHG | OXYGEN SATURATION: 98 %

## 2021-10-01 DIAGNOSIS — R41.3 OTHER AMNESIA: ICD-10-CM

## 2021-10-01 PROCEDURE — 99215 OFFICE O/P EST HI 40 MIN: CPT

## 2021-10-01 NOTE — DATA REVIEWED
[FreeTextEntry1] : mmse 28/30 sl short term memory loss\par labs reviewed -a1c was 7.8 \par ct chrst - rll nodular opacities up to 1 cm

## 2021-10-01 NOTE — PHYSICAL EXAM
[No Acute Distress] : no acute distress [Normal TMs] : both tympanic membranes were normal [Supple] : supple [Clear to Auscultation] : lungs were clear to auscultation bilaterally [Normal S1, S2] : normal S1 and S2 [de-identified] : pos sortic murmur

## 2021-10-01 NOTE — HISTORY OF PRESENT ILLNESS
[FreeTextEntry1] : PT F/U for HTN and Diabetes.pt daughter called and wants to have his memoe=y checked , pt needs script to repeatr his ct chest as was slight abnl  [de-identified] : pt was having cough, did cxr , was sl off , did ct,  showed nocdules which recommended short term fu- ordered \par pt presnt stoday w cough resolved , pt though while here today did have elevated blood pressure

## 2021-10-20 ENCOUNTER — OUTPATIENT (OUTPATIENT)
Dept: OUTPATIENT SERVICES | Facility: HOSPITAL | Age: 81
LOS: 1 days | End: 2021-10-20
Payer: MEDICARE

## 2021-10-20 ENCOUNTER — APPOINTMENT (OUTPATIENT)
Dept: CT IMAGING | Facility: CLINIC | Age: 81
End: 2021-10-20
Payer: MEDICARE

## 2021-10-20 DIAGNOSIS — R41.3 OTHER AMNESIA: ICD-10-CM

## 2021-10-20 PROCEDURE — 71250 CT THORAX DX C-: CPT

## 2021-10-20 PROCEDURE — 71250 CT THORAX DX C-: CPT | Mod: 26

## 2021-11-19 ENCOUNTER — APPOINTMENT (OUTPATIENT)
Dept: FAMILY MEDICINE | Facility: CLINIC | Age: 81
End: 2021-11-19
Payer: MEDICARE

## 2021-11-19 VITALS
HEART RATE: 98 BPM | WEIGHT: 153 LBS | DIASTOLIC BLOOD PRESSURE: 76 MMHG | RESPIRATION RATE: 16 BRPM | OXYGEN SATURATION: 98 % | HEIGHT: 72 IN | SYSTOLIC BLOOD PRESSURE: 176 MMHG | BODY MASS INDEX: 20.72 KG/M2 | TEMPERATURE: 97.2 F

## 2021-11-19 DIAGNOSIS — R91.8 OTHER NONSPECIFIC ABNORMAL FINDING OF LUNG FIELD: ICD-10-CM

## 2021-11-19 DIAGNOSIS — H91.93 UNSPECIFIED HEARING LOSS, BILATERAL: ICD-10-CM

## 2021-11-19 DIAGNOSIS — N52.9 MALE ERECTILE DYSFUNCTION, UNSPECIFIED: ICD-10-CM

## 2021-11-19 PROCEDURE — 99214 OFFICE O/P EST MOD 30 MIN: CPT

## 2021-11-20 PROBLEM — H91.93 DECREASED HEARING OF BOTH EARS: Status: ACTIVE | Noted: 2021-11-20

## 2021-11-20 PROBLEM — N52.9 ERECTILE DYSFUNCTION: Status: ACTIVE | Noted: 2020-09-17

## 2021-11-20 PROBLEM — R91.8 MULTIPLE LUNG NODULES ON CT: Status: ACTIVE | Noted: 2021-08-20

## 2021-11-20 NOTE — PHYSICAL EXAM
[Normal Sclera/Conjunctiva] : normal sclera/conjunctiva [Normal Outer Ear/Nose] : the outer ears and nose were normal in appearance [Supple] : supple [No Respiratory Distress] : no respiratory distress  [Clear to Auscultation] : lungs were clear to auscultation bilaterally [Clear Rhinorrhea] : clear rhinorrhea [No Accessory Muscle Use] : no accessory muscle use [Erythematous Oropharynx] : erythematous oropharynx [Normal Rate] : normal rate  [NL] : warm [Regular Rhythm] : with a regular rhythm [Normal S1, S2] : normal S1 and S2 [Pedal Pulses Present] : the pedal pulses are present [No Edema] : there was no peripheral edema [Normal] : soft, non-tender, non-distended, no masses palpated, no HSM and normal bowel sounds [No Spinal Tenderness] : no spinal tenderness [Grossly Normal Strength/Tone] : grossly normal strength/tone [Coordination Grossly Intact] : coordination grossly intact [No Focal Deficits] : no focal deficits [Normal Gait] : normal gait [Normal Affect] : the affect was normal [Normal Insight/Judgement] : insight and judgment were intact

## 2021-11-20 NOTE — HISTORY OF PRESENT ILLNESS
[FreeTextEntry1] : Pt is here to go over Chest CT results  [de-identified] : Mr. JERMAN MCKEON is a 81 year male in office here to follow up for pulmonary nodules. Patient had repeat CT scan, which will be reviewed with him today. Patient is also requesting refills of medications to last until next appointment.\par Patient has noticed a decrease in his hearing, asking if OK to get hearing aides without getting Audiology eval.

## 2021-11-20 NOTE — DATA REVIEWED
[FreeTextEntry1] : CT shows impression: Resolved mid to lower lung aspiration. Stable apical nodularity likely related to pleural-parenchymal scarring. No new nodules.\par \par \par \par

## 2021-11-20 NOTE — PLAN
[FreeTextEntry1] : Monitor nodules yearly.\par Refill Medication as needed\par Referral to ENT and Audiologist

## 2021-11-22 ENCOUNTER — APPOINTMENT (OUTPATIENT)
Dept: FAMILY MEDICINE | Facility: CLINIC | Age: 81
End: 2021-11-22

## 2021-11-23 ENCOUNTER — APPOINTMENT (OUTPATIENT)
Dept: FAMILY MEDICINE | Facility: CLINIC | Age: 81
End: 2021-11-23
Payer: MEDICARE

## 2021-11-23 PROCEDURE — 0013A: CPT

## 2022-01-21 ENCOUNTER — APPOINTMENT (OUTPATIENT)
Dept: FAMILY MEDICINE | Facility: CLINIC | Age: 82
End: 2022-01-21
Payer: COMMERCIAL

## 2022-01-21 VITALS
BODY MASS INDEX: 20.34 KG/M2 | OXYGEN SATURATION: 98 % | TEMPERATURE: 97.2 F | WEIGHT: 150 LBS | HEART RATE: 108 BPM | RESPIRATION RATE: 16 BRPM

## 2022-01-21 PROCEDURE — 99213 OFFICE O/P EST LOW 20 MIN: CPT

## 2022-01-23 NOTE — HISTORY OF PRESENT ILLNESS
[FreeTextEntry8] : Mr. JERMAN MCKEON is a 81 year male in office c/o redness in lower right led for 5 days. Patient states he has a similar episode some years ago, and it is due to the veins in the legs.  Patient denies any fever, chills or injury to the leg. He endorses the area of redness has not increased in size since he noticed it about 5 days ago. \par

## 2022-01-23 NOTE — PHYSICAL EXAM
[Normal] : no acute distress, well nourished, well developed and well-appearing [Normal Sclera/Conjunctiva] : normal sclera/conjunctiva [Normal Outer Ear/Nose] : the outer ears and nose were normal in appearance [Supple] : supple [No Respiratory Distress] : no respiratory distress  [No Accessory Muscle Use] : no accessory muscle use [Telangiectasia] : telangiectasia [Lesions Ankles Right] : on the right ankle [Coordination Grossly Intact] : coordination grossly intact [No Focal Deficits] : no focal deficits [Normal Gait] : normal gait [Normal Affect] : the affect was normal [Normal Insight/Judgement] : insight and judgment were intact [de-identified] : trace pedal edema, with varicosities in RLE [de-identified] : s

## 2022-02-22 ENCOUNTER — APPOINTMENT (OUTPATIENT)
Dept: FAMILY MEDICINE | Facility: CLINIC | Age: 82
End: 2022-02-22
Payer: MEDICARE

## 2022-02-22 VITALS
OXYGEN SATURATION: 99 % | BODY MASS INDEX: 20.45 KG/M2 | SYSTOLIC BLOOD PRESSURE: 138 MMHG | HEART RATE: 107 BPM | WEIGHT: 151 LBS | HEIGHT: 72 IN | DIASTOLIC BLOOD PRESSURE: 82 MMHG | TEMPERATURE: 97.7 F

## 2022-02-22 DIAGNOSIS — I87.2 VENOUS INSUFFICIENCY (CHRONIC) (PERIPHERAL): ICD-10-CM

## 2022-02-22 DIAGNOSIS — Z86.79 PERSONAL HISTORY OF OTHER DISEASES OF THE CIRCULATORY SYSTEM: ICD-10-CM

## 2022-02-22 PROCEDURE — 99214 OFFICE O/P EST MOD 30 MIN: CPT | Mod: 25

## 2022-02-22 PROCEDURE — 36415 COLL VENOUS BLD VENIPUNCTURE: CPT

## 2022-02-22 RX ORDER — BETAMETHASONE DIPROPIONATE 0.5 MG/G
0.05 OINTMENT TOPICAL TWICE DAILY
Qty: 1 | Refills: 0 | Status: DISCONTINUED | COMMUNITY
Start: 2022-01-21 | End: 2022-02-22

## 2022-02-23 PROBLEM — Z86.79 HISTORY OF UNCONTROLLED HYPERTENSION: Status: RESOLVED | Noted: 2021-10-01 | Resolved: 2022-02-23

## 2022-02-23 PROBLEM — I87.2 VENOUS STASIS DERMATITIS OF RIGHT LOWER EXTREMITY: Status: ACTIVE | Noted: 2022-01-21

## 2022-02-23 NOTE — PHYSICAL EXAM
[Normal Outer Ear/Nose] : the outer ears and nose were normal in appearance [Supple] : supple [Normal Rate] : normal rate  [Regular Rhythm] : with a regular rhythm [Normal S1, S2] : normal S1 and S2 [Pedal Pulses Present] : the pedal pulses are present [No Edema] : there was no peripheral edema [Normal] : no rash [Coordination Grossly Intact] : coordination grossly intact [No Focal Deficits] : no focal deficits [Normal Gait] : normal gait [Normal Affect] : the affect was normal [Normal Insight/Judgement] : insight and judgment were intact

## 2022-02-24 NOTE — HISTORY OF PRESENT ILLNESS
[FreeTextEntry1] : Patient is following up on HTN, DM, and hyperlipidemia. [de-identified] : Mr. JERMAN MCKEON is a 81 year male in office to follow up on HTN, DMII, and HLD. Patient reports he is doing OK, no acute complaints. His daughter just mentioned possibly moving to [place], but patient thinks it is not a good idea for him and wife as wife has a lot of medical issues and is currently seeing many doctors.\par He reports that his leg redness is better since he has been wearing compression socks.\par Patient is due for labs, he had coffee this morning.

## 2022-02-28 LAB
ALBUMIN SERPL ELPH-MCNC: 4.8 G/DL
ALP BLD-CCNC: 96 U/L
ALT SERPL-CCNC: 15 U/L
ANION GAP SERPL CALC-SCNC: 14 MMOL/L
AST SERPL-CCNC: 21 U/L
BASOPHILS # BLD AUTO: 0.04 K/UL
BASOPHILS NFR BLD AUTO: 0.5 %
BILIRUB SERPL-MCNC: 0.8 MG/DL
BUN SERPL-MCNC: 19 MG/DL
CALCIUM SERPL-MCNC: 9.5 MG/DL
CHLORIDE SERPL-SCNC: 106 MMOL/L
CHOLEST SERPL-MCNC: 143 MG/DL
CO2 SERPL-SCNC: 20 MMOL/L
CREAT SERPL-MCNC: 0.97 MG/DL
EOSINOPHIL # BLD AUTO: 0.31 K/UL
EOSINOPHIL NFR BLD AUTO: 3.7 %
ESTIMATED AVERAGE GLUCOSE: 189 MG/DL
GLUCOSE SERPL-MCNC: 173 MG/DL
HBA1C MFR BLD HPLC: 8.2 %
HCT VFR BLD CALC: 41.8 %
HDLC SERPL-MCNC: 64 MG/DL
HGB BLD-MCNC: 14.1 G/DL
IMM GRANULOCYTES NFR BLD AUTO: 0.5 %
LDLC SERPL CALC-MCNC: 65 MG/DL
LYMPHOCYTES # BLD AUTO: 1.19 K/UL
LYMPHOCYTES NFR BLD AUTO: 14.3 %
MAN DIFF?: NORMAL
MCHC RBC-ENTMCNC: 33.4 PG
MCHC RBC-ENTMCNC: 33.7 GM/DL
MCV RBC AUTO: 99.1 FL
MONOCYTES # BLD AUTO: 0.8 K/UL
MONOCYTES NFR BLD AUTO: 9.6 %
NEUTROPHILS # BLD AUTO: 5.97 K/UL
NEUTROPHILS NFR BLD AUTO: 71.4 %
NONHDLC SERPL-MCNC: 79 MG/DL
PLATELET # BLD AUTO: 197 K/UL
POTASSIUM SERPL-SCNC: 4.5 MMOL/L
PROT SERPL-MCNC: 6.7 G/DL
RBC # BLD: 4.22 M/UL
RBC # FLD: 12.4 %
SODIUM SERPL-SCNC: 141 MMOL/L
TRIGL SERPL-MCNC: 70 MG/DL
TSH SERPL-ACNC: 2.33 UIU/ML
WBC # FLD AUTO: 8.35 K/UL

## 2022-03-01 ENCOUNTER — APPOINTMENT (OUTPATIENT)
Dept: FAMILY MEDICINE | Facility: CLINIC | Age: 82
End: 2022-03-01
Payer: MEDICARE

## 2022-03-01 PROCEDURE — 99441: CPT

## 2022-03-01 NOTE — END OF VISIT
[FreeTextEntry3] : start: 10:15 am\par end   10: 24 am [Time Spent: ___ minutes] : I have spent [unfilled] minutes of time on the encounter.

## 2022-03-01 NOTE — HISTORY OF PRESENT ILLNESS
[Home] : at home, [unfilled] , at the time of the visit. [Medical Office: ([facility])___] : at the medical office located in  [Verbal consent obtained from patient] : the patient, [unfilled] [Spouse] : spouse [de-identified] : Mr. JERMAN MCKEON presents for a telephonic appointment to reviewed abnormal labs. Patient's A1c is increasing and has been for the past 3 lab checks. Patient started off appointment stating he know what he is doing, has has been having a lot of sugar-free cookies (Short bread), and Lot's of [place] and bread rolls. Patient does enjoy snacking on vegetables and agrees to snack on non-starchy vegetables. At this time patient would like to defer change in medications.  [FreeTextEntry1] : Patient is following up on

## 2022-03-01 NOTE — HISTORY OF PRESENT ILLNESS
[Home] : at home, [unfilled] , at the time of the visit. [Medical Office: ([facility])___] : at the medical office located in  [Verbal consent obtained from patient] : the patient, [unfilled] [Spouse] : spouse [de-identified] : Mr. JERMAN MCKEON presents for a telephonic appointment to reviewed abnormal labs. Patient's A1c is increasing and has been for the past 3 lab checks. Patient started off appointment stating he know what he is doing, has has been having a lot of sugar-free cookies (Short bread), and Lot's of [place] [employer] and bread rolls. Patient does enjoy snacking on vegetables and agrees to snack on non-starchy vegetables. At this time patient would like to defer change in medications.  [FreeTextEntry1] : Patient is following up on

## 2022-05-26 ENCOUNTER — APPOINTMENT (OUTPATIENT)
Dept: FAMILY MEDICINE | Facility: CLINIC | Age: 82
End: 2022-05-26
Payer: MEDICARE

## 2022-05-26 VITALS
HEART RATE: 86 BPM | OXYGEN SATURATION: 97 % | WEIGHT: 149 LBS | HEIGHT: 72 IN | RESPIRATION RATE: 16 BRPM | BODY MASS INDEX: 20.18 KG/M2

## 2022-05-26 VITALS — SYSTOLIC BLOOD PRESSURE: 132 MMHG | DIASTOLIC BLOOD PRESSURE: 84 MMHG

## 2022-05-26 DIAGNOSIS — M77.8 OTHER ENTHESOPATHIES, NOT ELSEWHERE CLASSIFIED: ICD-10-CM

## 2022-05-26 PROCEDURE — 99214 OFFICE O/P EST MOD 30 MIN: CPT | Mod: 25

## 2022-05-26 PROCEDURE — 36415 COLL VENOUS BLD VENIPUNCTURE: CPT

## 2022-05-26 NOTE — HISTORY OF PRESENT ILLNESS
[FreeTextEntry1] : Patient is following up on Diabetes, HLD and HTN [de-identified] : Patient reports he is doing well, he has no acute complaints. He notes he modified his diet and hopes A1c will show it.  He is also requesting medication refills. Patient is due for fasting labs today. He is contemplating the second booster, we discussed it.

## 2022-05-29 LAB
ALBUMIN SERPL ELPH-MCNC: 4.8 G/DL
ALP BLD-CCNC: 88 U/L
ALT SERPL-CCNC: 15 U/L
ANION GAP SERPL CALC-SCNC: 18 MMOL/L
AST SERPL-CCNC: 25 U/L
BASOPHILS # BLD AUTO: 0.03 K/UL
BASOPHILS NFR BLD AUTO: 0.5 %
BILIRUB SERPL-MCNC: 0.8 MG/DL
BUN SERPL-MCNC: 22 MG/DL
CALCIUM SERPL-MCNC: 9.7 MG/DL
CHLORIDE SERPL-SCNC: 102 MMOL/L
CHOLEST SERPL-MCNC: 154 MG/DL
CO2 SERPL-SCNC: 20 MMOL/L
CREAT SERPL-MCNC: 0.95 MG/DL
EGFR: 80 ML/MIN/1.73M2
EOSINOPHIL # BLD AUTO: 0.14 K/UL
EOSINOPHIL NFR BLD AUTO: 2.3 %
ESTIMATED AVERAGE GLUCOSE: 154 MG/DL
GLUCOSE SERPL-MCNC: 139 MG/DL
HBA1C MFR BLD HPLC: 7 %
HCT VFR BLD CALC: 41.4 %
HDLC SERPL-MCNC: 59 MG/DL
HGB BLD-MCNC: 13.9 G/DL
IMM GRANULOCYTES NFR BLD AUTO: 0.3 %
LDLC SERPL CALC-MCNC: 83 MG/DL
LYMPHOCYTES # BLD AUTO: 1.16 K/UL
LYMPHOCYTES NFR BLD AUTO: 18.6 %
MAN DIFF?: NORMAL
MCHC RBC-ENTMCNC: 33.6 GM/DL
MCHC RBC-ENTMCNC: 34.4 PG
MCV RBC AUTO: 102.5 FL
MONOCYTES # BLD AUTO: 0.67 K/UL
MONOCYTES NFR BLD AUTO: 10.8 %
NEUTROPHILS # BLD AUTO: 4.2 K/UL
NEUTROPHILS NFR BLD AUTO: 67.5 %
NONHDLC SERPL-MCNC: 94 MG/DL
PLATELET # BLD AUTO: 174 K/UL
POTASSIUM SERPL-SCNC: 4.6 MMOL/L
PROT SERPL-MCNC: 6.7 G/DL
RBC # BLD: 4.04 M/UL
RBC # FLD: 12.6 %
SODIUM SERPL-SCNC: 141 MMOL/L
TRIGL SERPL-MCNC: 57 MG/DL
WBC # FLD AUTO: 6.22 K/UL

## 2022-06-04 ENCOUNTER — NON-APPOINTMENT (OUTPATIENT)
Age: 82
End: 2022-06-04

## 2022-07-01 ENCOUNTER — RESULT REVIEW (OUTPATIENT)
Age: 82
End: 2022-07-01

## 2022-07-01 ENCOUNTER — OUTPATIENT (OUTPATIENT)
Dept: OUTPATIENT SERVICES | Facility: HOSPITAL | Age: 82
LOS: 1 days | End: 2022-07-01
Payer: MEDICARE

## 2022-07-01 ENCOUNTER — APPOINTMENT (OUTPATIENT)
Dept: FAMILY MEDICINE | Facility: CLINIC | Age: 82
End: 2022-07-01

## 2022-07-01 ENCOUNTER — APPOINTMENT (OUTPATIENT)
Dept: RADIOLOGY | Facility: CLINIC | Age: 82
End: 2022-07-01

## 2022-07-01 VITALS
WEIGHT: 147 LBS | HEIGHT: 72 IN | TEMPERATURE: 97.9 F | BODY MASS INDEX: 19.91 KG/M2 | HEART RATE: 93 BPM | SYSTOLIC BLOOD PRESSURE: 142 MMHG | OXYGEN SATURATION: 98 % | DIASTOLIC BLOOD PRESSURE: 89 MMHG | RESPIRATION RATE: 16 BRPM

## 2022-07-01 DIAGNOSIS — R05.9 COUGH, UNSPECIFIED: ICD-10-CM

## 2022-07-01 DIAGNOSIS — S69.91XD UNSPECIFIED INJURY OF RIGHT WRIST, HAND AND FINGER(S), SUBSEQUENT ENCOUNTER: ICD-10-CM

## 2022-07-01 PROCEDURE — 71046 X-RAY EXAM CHEST 2 VIEWS: CPT

## 2022-07-01 PROCEDURE — 73140 X-RAY EXAM OF FINGER(S): CPT

## 2022-07-01 PROCEDURE — 71046 X-RAY EXAM CHEST 2 VIEWS: CPT | Mod: 26

## 2022-07-01 PROCEDURE — 99213 OFFICE O/P EST LOW 20 MIN: CPT

## 2022-07-01 PROCEDURE — 73140 X-RAY EXAM OF FINGER(S): CPT | Mod: 26,RT

## 2022-07-01 NOTE — HEALTH RISK ASSESSMENT
[Former] : Former [Yes] : Yes [4 or more  times a week (4 pts)] : 4 or more  times a week (4 points) [1 or 2 (0 pts)] : 1 or 2 (0 points) [No] : In the past 12 months have you used drugs other than those required for medical reasons? No [No falls in past year] : Patient reported no falls in the past year [0] : 2) Feeling down, depressed, or hopeless: Not at all (0) [Audit-CScore] : 4 [[id number]] : 0

## 2022-07-01 NOTE — HISTORY OF PRESENT ILLNESS
[FreeTextEntry8] : Mr. JERMAN MCKEON is a 81 year male in office with c/o of swollen, painful right index finger for past 1 month. Patient injured the finger from impact of the power garden tool at the beginning of June. He was evaluated at urgent care, and according to him, x-ray showed no fracture and he was given steroid dose pack. The pain and swelling has decreased since the initial injury but the finger is still obviously swollen and mildly painful and tight when flexing the joints.\par \par Patient was also noted to be cough during appointment. He notes that it has been a "few" weeks since he started coughing. Cough is productive of clear sputum, does not keep him up at night, but bothersome during the daytime. \par

## 2022-07-01 NOTE — PHYSICAL EXAM
[Normal] : no acute distress, well nourished, well developed and well-appearing [Normal Sclera/Conjunctiva] : normal sclera/conjunctiva [PERRL] : pupils equal round and reactive to light [Normal Rate] : normal rate  [Regular Rhythm] : with a regular rhythm [Normal S1, S2] : normal S1 and S2 [No Edema] : there was no peripheral edema [No Rash] : no rash [Coordination Grossly Intact] : coordination grossly intact [No Focal Deficits] : no focal deficits [Normal Gait] : normal gait [Normal Affect] : the affect was normal [Normal Insight/Judgement] : insight and judgment were intact [de-identified] : RML + wheeze [de-identified] : R index finger DIP and PIP edema, ROM intact

## 2022-07-05 ENCOUNTER — APPOINTMENT (OUTPATIENT)
Dept: FAMILY MEDICINE | Facility: CLINIC | Age: 82
End: 2022-07-05

## 2022-07-05 PROCEDURE — 99441: CPT

## 2022-07-05 NOTE — HISTORY OF PRESENT ILLNESS
[Home] : at home, [unfilled] , at the time of the visit. [Medical Office: ([facility])___] : at the medical office located in  [Spouse] : spouse [Verbal consent obtained from patient] : the patient, [unfilled] [FreeTextEntry8] : Mr. JERMAN MCKEON presents for a telephonic appointment c/o left eye redness and itching for 3-4 days. He notes the itching has stopped, redness has improved but still present. He denies eyelid swelling, eyes do not water any longer and there is no discharge. Vision is fine.

## 2022-07-05 NOTE — END OF VISIT
[FreeTextEntry3] : start 2:07 pm \par end  2:17 pm [Time Spent: ___ minutes] : I have spent [unfilled] minutes of time on the encounter.

## 2022-07-15 ENCOUNTER — APPOINTMENT (OUTPATIENT)
Dept: FAMILY MEDICINE | Facility: CLINIC | Age: 82
End: 2022-07-15

## 2022-07-27 ENCOUNTER — APPOINTMENT (OUTPATIENT)
Dept: FAMILY MEDICINE | Facility: CLINIC | Age: 82
End: 2022-07-27

## 2022-07-27 VITALS
TEMPERATURE: 97.2 F | HEART RATE: 103 BPM | WEIGHT: 142 LBS | BODY MASS INDEX: 19.23 KG/M2 | DIASTOLIC BLOOD PRESSURE: 84 MMHG | SYSTOLIC BLOOD PRESSURE: 136 MMHG | RESPIRATION RATE: 16 BRPM | HEIGHT: 72 IN | OXYGEN SATURATION: 94 %

## 2022-07-27 DIAGNOSIS — H10.10 ACUTE ATOPIC CONJUNCTIVITIS, UNSPECIFIED EYE: ICD-10-CM

## 2022-07-27 PROCEDURE — 99214 OFFICE O/P EST MOD 30 MIN: CPT

## 2022-07-29 PROBLEM — H10.10 ALLERGIC CONJUNCTIVITIS: Status: RESOLVED | Noted: 2022-07-05 | Resolved: 2022-07-29

## 2022-07-29 NOTE — PHYSICAL EXAM
[Normal Outer Ear/Nose] : the outer ears and nose were normal in appearance [Supple] : supple [No Accessory Muscle Use] : no accessory muscle use [No Edema] : there was no peripheral edema [Normal] : no joint swelling and grossly normal strength and tone [No Rash] : no rash [Coordination Grossly Intact] : coordination grossly intact [No Focal Deficits] : no focal deficits [Normal Gait] : normal gait [Normal Affect] : the affect was normal [Normal Insight/Judgement] : insight and judgment were intact [de-identified] : scatterd rales at lung bases

## 2022-07-29 NOTE — HISTORY OF PRESENT ILLNESS
[FreeTextEntry1] : cough\par follow up injured finger\par \par  [de-identified] : Patient c/o persistent coughing. He was evaluated with xray at last visit, but states cough is persistent and bothersome. No fever or chills and no colored sputum.\par Regarding injured finger, swelling has improved and use is better, though not 100%

## 2022-08-24 ENCOUNTER — APPOINTMENT (OUTPATIENT)
Dept: FAMILY MEDICINE | Facility: CLINIC | Age: 82
End: 2022-08-24

## 2022-08-24 VITALS
TEMPERATURE: 97.9 F | HEIGHT: 72 IN | BODY MASS INDEX: 19.5 KG/M2 | SYSTOLIC BLOOD PRESSURE: 138 MMHG | WEIGHT: 144 LBS | OXYGEN SATURATION: 100 % | DIASTOLIC BLOOD PRESSURE: 88 MMHG | HEART RATE: 91 BPM

## 2022-08-24 DIAGNOSIS — Z87.09 PERSONAL HISTORY OF OTHER DISEASES OF THE RESPIRATORY SYSTEM: ICD-10-CM

## 2022-08-24 LAB
BILIRUB UR QL STRIP: NORMAL
CLARITY UR: CLEAR
COLLECTION METHOD: NORMAL
GLUCOSE UR-MCNC: NORMAL
HCG UR QL: 0.2 EU/DL
HGB UR QL STRIP.AUTO: NORMAL
KETONES UR-MCNC: NORMAL
LEUKOCYTE ESTERASE UR QL STRIP: NORMAL
NITRITE UR QL STRIP: NORMAL
PH UR STRIP: 5
PROT UR STRIP-MCNC: NORMAL
SP GR UR STRIP: 1.02

## 2022-08-24 PROCEDURE — 36415 COLL VENOUS BLD VENIPUNCTURE: CPT

## 2022-08-24 PROCEDURE — 99214 OFFICE O/P EST MOD 30 MIN: CPT | Mod: 25

## 2022-08-24 PROCEDURE — 81003 URINALYSIS AUTO W/O SCOPE: CPT | Mod: QW

## 2022-08-24 RX ORDER — GUAIFENESIN 600 MG/1
600 TABLET, EXTENDED RELEASE ORAL
Qty: 14 | Refills: 0 | Status: DISCONTINUED | COMMUNITY
Start: 2022-07-27 | End: 2022-08-24

## 2022-08-24 RX ORDER — AZITHROMYCIN 250 MG/1
250 TABLET, FILM COATED ORAL
Qty: 6 | Refills: 0 | Status: DISCONTINUED | COMMUNITY
Start: 2021-05-04 | End: 2022-08-24

## 2022-08-24 NOTE — HISTORY OF PRESENT ILLNESS
[FreeTextEntry1] : Patient is following up on HLD, HTN and DM. [de-identified] : Patient reports he is doing well, he has no acute complaints. He is due for labs.

## 2022-08-24 NOTE — PHYSICAL EXAM
[Normal Outer Ear/Nose] : the outer ears and nose were normal in appearance [Supple] : supple [Normal] : normal rate, regular rhythm, normal S1 and S2 and no murmur heard [Pedal Pulses Present] : the pedal pulses are present [No Edema] : there was no peripheral edema [No Joint Swelling] : no joint swelling [Grossly Normal Strength/Tone] : grossly normal strength/tone [No Rash] : no rash [Coordination Grossly Intact] : coordination grossly intact [No Focal Deficits] : no focal deficits [Normal Gait] : normal gait [Normal Affect] : the affect was normal [Normal Insight/Judgement] : insight and judgment were intact

## 2022-08-29 LAB
ALBUMIN SERPL ELPH-MCNC: 4.3 G/DL
ALP BLD-CCNC: 97 U/L
ALT SERPL-CCNC: 16 U/L
ANION GAP SERPL CALC-SCNC: 14 MMOL/L
AST SERPL-CCNC: 23 U/L
BASOPHILS # BLD AUTO: 0.04 K/UL
BASOPHILS NFR BLD AUTO: 0.7 %
BILIRUB SERPL-MCNC: 0.6 MG/DL
BUN SERPL-MCNC: 18 MG/DL
CALCIUM SERPL-MCNC: 9.4 MG/DL
CHLORIDE SERPL-SCNC: 108 MMOL/L
CHOLEST SERPL-MCNC: 145 MG/DL
CO2 SERPL-SCNC: 21 MMOL/L
CREAT SERPL-MCNC: 0.96 MG/DL
EGFR: 79 ML/MIN/1.73M2
EOSINOPHIL # BLD AUTO: 0.16 K/UL
EOSINOPHIL NFR BLD AUTO: 2.7 %
ESTIMATED AVERAGE GLUCOSE: 174 MG/DL
GLUCOSE SERPL-MCNC: 137 MG/DL
HBA1C MFR BLD HPLC: 7.7 %
HCT VFR BLD CALC: 42.1 %
HDLC SERPL-MCNC: 61 MG/DL
HGB BLD-MCNC: 13.8 G/DL
IMM GRANULOCYTES NFR BLD AUTO: 0.3 %
LDLC SERPL CALC-MCNC: 69 MG/DL
LYMPHOCYTES # BLD AUTO: 0.97 K/UL
LYMPHOCYTES NFR BLD AUTO: 16.1 %
MAN DIFF?: NORMAL
MCHC RBC-ENTMCNC: 32.8 GM/DL
MCHC RBC-ENTMCNC: 33.7 PG
MCV RBC AUTO: 102.9 FL
MONOCYTES # BLD AUTO: 0.54 K/UL
MONOCYTES NFR BLD AUTO: 9 %
NEUTROPHILS # BLD AUTO: 4.29 K/UL
NEUTROPHILS NFR BLD AUTO: 71.2 %
NONHDLC SERPL-MCNC: 83 MG/DL
PLATELET # BLD AUTO: 195 K/UL
POTASSIUM SERPL-SCNC: 4.7 MMOL/L
PROT SERPL-MCNC: 6.4 G/DL
RBC # BLD: 4.09 M/UL
RBC # FLD: 12.9 %
SODIUM SERPL-SCNC: 142 MMOL/L
TRIGL SERPL-MCNC: 74 MG/DL
TSH SERPL-ACNC: 2.26 UIU/ML
WBC # FLD AUTO: 6.02 K/UL

## 2022-09-08 ENCOUNTER — APPOINTMENT (OUTPATIENT)
Dept: FAMILY MEDICINE | Facility: CLINIC | Age: 82
End: 2022-09-08

## 2022-09-08 VITALS
HEART RATE: 95 BPM | BODY MASS INDEX: 20.05 KG/M2 | OXYGEN SATURATION: 98 % | HEIGHT: 72 IN | RESPIRATION RATE: 16 BRPM | TEMPERATURE: 98.4 F | WEIGHT: 148 LBS

## 2022-09-08 DIAGNOSIS — L23.7 ALLERGIC CONTACT DERMATITIS DUE TO PLANTS, EXCEPT FOOD: ICD-10-CM

## 2022-09-08 PROCEDURE — 99213 OFFICE O/P EST LOW 20 MIN: CPT

## 2022-09-12 PROBLEM — L23.7 PLANT ALLERGIC CONTACT DERMATITIS: Status: ACTIVE | Noted: 2022-09-08

## 2022-09-12 NOTE — PHYSICAL EXAM
[Normal] : normal sclera/conjunctiva, pupils are equal, round and reactive to light and extraocular movements are intact [Normal Outer Ear/Nose] : the outer ears and nose were normal in appearance [Supple] : supple [de-identified] : red blistering rash bilateral wrists and forearms

## 2022-09-12 NOTE — HISTORY OF PRESENT ILLNESS
[FreeTextEntry8] : Mr. JERMAN MCKEON is a 81 year male in office c/o red dots on anterior forearms x week ago.He notes this happened after he pulled up a vine from yard. The red rash is spreading up his arms: was initially at his wrist and has now spread to forearms. Minimal itching, has has been using OTC cortisone-10.\par \par

## 2022-11-22 ENCOUNTER — APPOINTMENT (OUTPATIENT)
Dept: FAMILY MEDICINE | Facility: CLINIC | Age: 82
End: 2022-11-22

## 2022-11-22 VITALS
OXYGEN SATURATION: 100 % | WEIGHT: 148 LBS | TEMPERATURE: 97.3 F | DIASTOLIC BLOOD PRESSURE: 88 MMHG | SYSTOLIC BLOOD PRESSURE: 136 MMHG | HEART RATE: 94 BPM | HEIGHT: 72 IN | BODY MASS INDEX: 20.05 KG/M2

## 2022-11-22 DIAGNOSIS — S69.91XD UNSPECIFIED INJURY OF RIGHT WRIST, HAND AND FINGER(S), SUBSEQUENT ENCOUNTER: ICD-10-CM

## 2022-11-22 PROCEDURE — 99214 OFFICE O/P EST MOD 30 MIN: CPT | Mod: 25

## 2022-11-22 PROCEDURE — 0124A: CPT

## 2022-11-22 PROCEDURE — 36415 COLL VENOUS BLD VENIPUNCTURE: CPT

## 2022-11-23 PROBLEM — S69.91XD INJURY OF RIGHT INDEX FINGER, SUBSEQUENT ENCOUNTER: Status: RESOLVED | Noted: 2022-07-01 | Resolved: 2022-11-23

## 2022-11-23 NOTE — HISTORY OF PRESENT ILLNESS
[FreeTextEntry1] : Patient is following up on DM, HLD, and HTN. [de-identified] : Mr. JERMAN MCKEON is a 82 year M presents to follow up on chronic gout, DM, HTN, HLD.  Patient is doing well, has no acute complaints at this time. \par Patient would like covid booster today.

## 2022-12-03 LAB
ALBUMIN SERPL ELPH-MCNC: 4.3 G/DL
ALP BLD-CCNC: 86 U/L
ALT SERPL-CCNC: 15 U/L
ANION GAP SERPL CALC-SCNC: 13 MMOL/L
AST SERPL-CCNC: 20 U/L
BASOPHILS # BLD AUTO: 0.05 K/UL
BASOPHILS NFR BLD AUTO: 0.6 %
BILIRUB SERPL-MCNC: 0.6 MG/DL
BUN SERPL-MCNC: 18 MG/DL
CALCIUM SERPL-MCNC: 9.4 MG/DL
CHLORIDE SERPL-SCNC: 105 MMOL/L
CHOLEST SERPL-MCNC: 142 MG/DL
CO2 SERPL-SCNC: 23 MMOL/L
CREAT SERPL-MCNC: 0.93 MG/DL
EGFR: 81 ML/MIN/1.73M2
EOSINOPHIL # BLD AUTO: 0.4 K/UL
EOSINOPHIL NFR BLD AUTO: 5.1 %
ESTIMATED AVERAGE GLUCOSE: 166 MG/DL
GLUCOSE SERPL-MCNC: 136 MG/DL
HBA1C MFR BLD HPLC: 7.4 %
HCT VFR BLD CALC: 42.7 %
HDLC SERPL-MCNC: 60 MG/DL
HGB BLD-MCNC: 14 G/DL
IMM GRANULOCYTES NFR BLD AUTO: 0.3 %
LDLC SERPL CALC-MCNC: 67 MG/DL
LYMPHOCYTES # BLD AUTO: 0.8 K/UL
LYMPHOCYTES NFR BLD AUTO: 10.3 %
MAN DIFF?: NORMAL
MCHC RBC-ENTMCNC: 32.8 GM/DL
MCHC RBC-ENTMCNC: 33.8 PG
MCV RBC AUTO: 103.1 FL
MONOCYTES # BLD AUTO: 0.7 K/UL
MONOCYTES NFR BLD AUTO: 9 %
NEUTROPHILS # BLD AUTO: 5.82 K/UL
NEUTROPHILS NFR BLD AUTO: 74.7 %
NONHDLC SERPL-MCNC: 82 MG/DL
PLATELET # BLD AUTO: 169 K/UL
POTASSIUM SERPL-SCNC: 4.4 MMOL/L
PROT SERPL-MCNC: 6.3 G/DL
RBC # BLD: 4.14 M/UL
RBC # FLD: 12.9 %
SODIUM SERPL-SCNC: 141 MMOL/L
TRIGL SERPL-MCNC: 76 MG/DL
WBC # FLD AUTO: 7.79 K/UL

## 2023-01-05 ENCOUNTER — APPOINTMENT (OUTPATIENT)
Dept: FAMILY MEDICINE | Facility: CLINIC | Age: 83
End: 2023-01-05
Payer: MEDICARE

## 2023-01-05 DIAGNOSIS — Z20.822 CONTACT WITH AND (SUSPECTED) EXPOSURE TO COVID-19: ICD-10-CM

## 2023-01-05 PROCEDURE — 99443: CPT

## 2023-01-05 NOTE — HISTORY OF PRESENT ILLNESS
[Home] : at home, [unfilled] , at the time of the visit. [Medical Office: ([facility])___] : at the medical office located in  [Verbal consent obtained from patient] : the patient, [unfilled] [FreeTextEntry8] : Patient C/O cough, congestion, yellow mucus, and body aches x 1 week.\par c/o yellow nasal congestion.\par No fever. + cough productive of yellow sputum.\par Feels achy.\par Pt states symptoms are worse first thing in the morning and at night.\par Pt states he is taking Mucinex with incomplete relief.\par Saline nasal rinse is helping to alleviate some of the sinus congestion.

## 2023-01-05 NOTE — PLAN
[FreeTextEntry1] : Start Time: 2:20 pm\par End Time: 2:50 pm\par Non-face-to-face time includes chart review prior to initiation of visit, and post visit processing of orders, and prescriptions.\par

## 2023-01-05 NOTE — PHYSICAL EXAM
[No Acute Distress] : no acute distress [No Respiratory Distress] : no respiratory distress  [Normal] : affect was normal and insight and judgment were intact [de-identified] : Sounds nasal

## 2023-03-06 ENCOUNTER — APPOINTMENT (OUTPATIENT)
Dept: FAMILY MEDICINE | Facility: CLINIC | Age: 83
End: 2023-03-06
Payer: MEDICARE

## 2023-03-06 VITALS — DIASTOLIC BLOOD PRESSURE: 76 MMHG | SYSTOLIC BLOOD PRESSURE: 150 MMHG

## 2023-03-06 VITALS
SYSTOLIC BLOOD PRESSURE: 148 MMHG | DIASTOLIC BLOOD PRESSURE: 82 MMHG | BODY MASS INDEX: 21.13 KG/M2 | HEART RATE: 100 BPM | HEIGHT: 72 IN | OXYGEN SATURATION: 98 % | WEIGHT: 156 LBS

## 2023-03-06 PROCEDURE — 99215 OFFICE O/P EST HI 40 MIN: CPT

## 2023-03-06 RX ORDER — HALOBETASOL PROPIONATE 0.5 MG/G
0.05 CREAM TOPICAL TWICE DAILY
Qty: 1 | Refills: 2 | Status: DISCONTINUED | COMMUNITY
Start: 2022-09-08 | End: 2023-03-06

## 2023-03-06 RX ORDER — LEVOCETIRIZINE DIHYDROCHLORIDE 5 MG/1
5 TABLET ORAL DAILY
Qty: 90 | Refills: 1 | Status: DISCONTINUED | COMMUNITY
Start: 2019-06-20 | End: 2023-03-06

## 2023-03-06 RX ORDER — BENZONATATE 100 MG/1
100 CAPSULE ORAL
Qty: 15 | Refills: 0 | Status: DISCONTINUED | COMMUNITY
Start: 2023-01-05 | End: 2023-03-06

## 2023-03-06 RX ORDER — AMOXICILLIN AND CLAVULANATE POTASSIUM 875; 125 MG/1; MG/1
875-125 TABLET, COATED ORAL
Qty: 20 | Refills: 0 | Status: DISCONTINUED | COMMUNITY
Start: 2023-01-05 | End: 2023-03-06

## 2023-03-06 NOTE — HISTORY OF PRESENT ILLNESS
[FreeTextEntry1] : Patient is here for medication renewals on his metformin and his simvastatin. Patient is fasting for repeat labs to check on his diabetes and cholesterol. [de-identified] : 82 year male with PMH of DM, ED, HTN, memory loss, decrease hearing seen today in office for medication refill and blood work. Patient report doing well. No complaints at this time. Patient BP elevate in office, denies any symptoms. Patient states BP high due to early appt and nervous meeting with new provider. Patient refuse any change in meditation at this time.  Awake, alert and oriented x4, in no acute distress. Denies any chest pain, shortness of breath, palpitation or dizziness. Patient verified medications and medical diagnosis. Report taking medications as prescribed.\par

## 2023-03-06 NOTE — HEALTH RISK ASSESSMENT
[No] : In the past 12 months have you used drugs other than those required for medical reasons? No [Not at All (0)] : 9.) Thoughts that you would be off dead or of hurting yourself in some way? Not at all [Audit-CScore] : 1 [de-identified] : walk [de-identified] : good [[id number]] : 1 [Patient/Caregiver unclear of wishes] : , patient/caregiver unclear of wishes [AdvancecareDate] : 03/23

## 2023-03-06 NOTE — ASSESSMENT
[FreeTextEntry1] : Check BP at home daily, goal < 130/80\par Continue medication as prescribed\par Educated about importance of healthy diet, physical activity, proper sleep (8 hours of sleep), importance of screening test for early detection and treatment of cancer. Importance of immunizations including COVID-19 and seasonal flu vaccine in order to prevent or lessen disease.\par Hypertrig/HLD\par  Explained amount of protein fat and carbs that are allowed on eat meal. Needs also to follow a caloric deficit. Good sources of good fats such as avocado, nuts, fish, good source of protein eggs, white part of chicken, beef from time to time. Good source of carbs low carb wraps, green veggies etc... also needs to start exercising. Patient understood and agreed with plan. Educated eating whole grain foods rich in soluble fiber such as oats and Omega 3 rich fish such as salmon, tout, sardines and bean based meals such as kidney beans, chickpeas and lentils. Small portions of nuts. Limit sugars and alcohol.\par Recommended Mediterranean diet \par Patient would like to try dietary habit modification before considering tx. I have educated pt about risk of hyper trig including pancreatitis and heart issues, he understands.\par \par \par F/U with PCP

## 2023-03-12 LAB
ALBUMIN SERPL ELPH-MCNC: 4.6 G/DL
ALP BLD-CCNC: 117 U/L
ALT SERPL-CCNC: 15 U/L
ANION GAP SERPL CALC-SCNC: 14 MMOL/L
AST SERPL-CCNC: 22 U/L
BASOPHILS # BLD AUTO: 0.06 K/UL
BASOPHILS NFR BLD AUTO: 0.8 %
BILIRUB SERPL-MCNC: 0.6 MG/DL
BUN SERPL-MCNC: 21 MG/DL
CALCIUM SERPL-MCNC: 9.7 MG/DL
CHLORIDE SERPL-SCNC: 104 MMOL/L
CHOLEST SERPL-MCNC: 144 MG/DL
CO2 SERPL-SCNC: 22 MMOL/L
CREAT SERPL-MCNC: 1 MG/DL
EGFR: 75 ML/MIN/1.73M2
EOSINOPHIL # BLD AUTO: 0.26 K/UL
EOSINOPHIL NFR BLD AUTO: 3.3 %
ESTIMATED AVERAGE GLUCOSE: 189 MG/DL
GLUCOSE SERPL-MCNC: 157 MG/DL
HBA1C MFR BLD HPLC: 8.2 %
HCT VFR BLD CALC: 42.4 %
HDLC SERPL-MCNC: 60 MG/DL
HGB BLD-MCNC: 13.7 G/DL
IMM GRANULOCYTES NFR BLD AUTO: 0.5 %
LDLC SERPL CALC-MCNC: 70 MG/DL
LYMPHOCYTES # BLD AUTO: 0.88 K/UL
LYMPHOCYTES NFR BLD AUTO: 11.3 %
MAN DIFF?: NORMAL
MCHC RBC-ENTMCNC: 32.2 PG
MCHC RBC-ENTMCNC: 32.3 GM/DL
MCV RBC AUTO: 99.5 FL
MONOCYTES # BLD AUTO: 0.72 K/UL
MONOCYTES NFR BLD AUTO: 9.3 %
NEUTROPHILS # BLD AUTO: 5.82 K/UL
NEUTROPHILS NFR BLD AUTO: 74.8 %
NONHDLC SERPL-MCNC: 84 MG/DL
PLATELET # BLD AUTO: 188 K/UL
POTASSIUM SERPL-SCNC: 4.7 MMOL/L
PROT SERPL-MCNC: 6.9 G/DL
PSA SERPL-MCNC: 1.83 NG/ML
RBC # BLD: 4.26 M/UL
RBC # FLD: 13.3 %
SODIUM SERPL-SCNC: 140 MMOL/L
TRIGL SERPL-MCNC: 74 MG/DL
TSH SERPL-ACNC: 3.03 UIU/ML
WBC # FLD AUTO: 7.78 K/UL

## 2023-03-15 ENCOUNTER — RX RENEWAL (OUTPATIENT)
Age: 83
End: 2023-03-15

## 2023-05-16 ENCOUNTER — APPOINTMENT (OUTPATIENT)
Dept: FAMILY MEDICINE | Facility: CLINIC | Age: 83
End: 2023-05-16

## 2023-06-01 ENCOUNTER — APPOINTMENT (OUTPATIENT)
Dept: FAMILY MEDICINE | Facility: CLINIC | Age: 83
End: 2023-06-01
Payer: MEDICARE

## 2023-06-01 VITALS
OXYGEN SATURATION: 98 % | TEMPERATURE: 97.4 F | DIASTOLIC BLOOD PRESSURE: 86 MMHG | HEART RATE: 97 BPM | WEIGHT: 150 LBS | SYSTOLIC BLOOD PRESSURE: 124 MMHG | RESPIRATION RATE: 16 BRPM | HEIGHT: 72 IN | BODY MASS INDEX: 20.32 KG/M2

## 2023-06-01 DIAGNOSIS — R79.89 OTHER SPECIFIED ABNORMAL FINDINGS OF BLOOD CHEMISTRY: ICD-10-CM

## 2023-06-01 DIAGNOSIS — I35.1 NONRHEUMATIC AORTIC (VALVE) INSUFFICIENCY: ICD-10-CM

## 2023-06-01 PROCEDURE — 99214 OFFICE O/P EST MOD 30 MIN: CPT | Mod: 25

## 2023-06-01 PROCEDURE — 36415 COLL VENOUS BLD VENIPUNCTURE: CPT

## 2023-06-01 NOTE — HISTORY OF PRESENT ILLNESS
[FreeTextEntry1] : Follow up on HTN, DM, HLD [de-identified] : Mr. JERMAN MCKEON is a pleasant 82 year male who presents to follow up on HTN, HLD, DM. Patient reports feeling well. He is is doing less yard work as he finds it more tiring but he is able to run routine things like going to store and fixing food.

## 2023-06-01 NOTE — PHYSICAL EXAM
[Normal Outer Ear/Nose] : the outer ears and nose were normal in appearance [Supple] : supple [Normal Rate] : normal rate  [Regular Rhythm] : with a regular rhythm [Normal S1, S2] : normal S1 and S2 [Pedal Pulses Present] : the pedal pulses are present [No Edema] : there was no peripheral edema [Normal] : no joint swelling and grossly normal strength and tone [No Rash] : no rash [Coordination Grossly Intact] : coordination grossly intact [No Focal Deficits] : no focal deficits [Normal Gait] : normal gait [Normal Affect] : the affect was normal [Normal Insight/Judgement] : insight and judgment were intact [de-identified] : +murmur

## 2023-06-06 LAB
25(OH)D3 SERPL-MCNC: 34.7 NG/ML
ALBUMIN SERPL ELPH-MCNC: 4.7 G/DL
ALP BLD-CCNC: 103 U/L
ALT SERPL-CCNC: 14 U/L
ANION GAP SERPL CALC-SCNC: 14 MMOL/L
AST SERPL-CCNC: 23 U/L
BILIRUB SERPL-MCNC: 0.8 MG/DL
BUN SERPL-MCNC: 19 MG/DL
CALCIUM SERPL-MCNC: 9.7 MG/DL
CHLORIDE SERPL-SCNC: 106 MMOL/L
CHOLEST SERPL-MCNC: 141 MG/DL
CO2 SERPL-SCNC: 22 MMOL/L
CREAT SERPL-MCNC: 1.06 MG/DL
EGFR: 70 ML/MIN/1.73M2
ESTIMATED AVERAGE GLUCOSE: 174 MG/DL
GLUCOSE SERPL-MCNC: 158 MG/DL
HBA1C MFR BLD HPLC: 7.7 %
HDLC SERPL-MCNC: 61 MG/DL
LDLC SERPL CALC-MCNC: 62 MG/DL
NONHDLC SERPL-MCNC: 81 MG/DL
POTASSIUM SERPL-SCNC: 4.8 MMOL/L
PROT SERPL-MCNC: 6.7 G/DL
SODIUM SERPL-SCNC: 141 MMOL/L
TRIGL SERPL-MCNC: 91 MG/DL
TSH SERPL-ACNC: 3.62 UIU/ML

## 2023-08-08 ENCOUNTER — APPOINTMENT (OUTPATIENT)
Dept: FAMILY MEDICINE | Facility: CLINIC | Age: 83
End: 2023-08-08
Payer: MEDICARE

## 2023-08-08 PROCEDURE — 99441: CPT

## 2023-08-08 NOTE — HISTORY OF PRESENT ILLNESS
[Home] : at home, [unfilled] , at the time of the visit. [Medical Office: ([facility])___] : at the medical office located in  [Verbal consent obtained from patient] : the patient, [unfilled] [Cough] : cough [Moderate] : moderate [___ Weeks ago] :  [unfilled] weeks ago [Gradual] : gradually [Episodic] : episodic  [Congestion] : congestion [Worsening] : worsening [Sore Throat] : no sore throat [Wheezing] : no wheezing [Chills] : no chills [Shortness Of Breath] : no shortness of breath [Earache] : no earache [Fatigue] : not fatigue [Headache] : no headache [Fever] : no fever [FreeTextEntry2] : Post nasal

## 2023-09-07 ENCOUNTER — APPOINTMENT (OUTPATIENT)
Dept: FAMILY MEDICINE | Facility: CLINIC | Age: 83
End: 2023-09-07

## 2023-09-26 ENCOUNTER — APPOINTMENT (OUTPATIENT)
Dept: FAMILY MEDICINE | Facility: CLINIC | Age: 83
End: 2023-09-26
Payer: MEDICARE

## 2023-09-26 VITALS
HEIGHT: 72 IN | OXYGEN SATURATION: 98 % | SYSTOLIC BLOOD PRESSURE: 138 MMHG | WEIGHT: 153 LBS | DIASTOLIC BLOOD PRESSURE: 72 MMHG | BODY MASS INDEX: 20.72 KG/M2 | HEART RATE: 104 BPM | TEMPERATURE: 97.7 F

## 2023-09-26 DIAGNOSIS — J20.8 ACUTE BRONCHITIS DUE TO OTHER SPECIFIED ORGANISMS: ICD-10-CM

## 2023-09-26 DIAGNOSIS — Z23 ENCOUNTER FOR IMMUNIZATION: ICD-10-CM

## 2023-09-26 DIAGNOSIS — B96.89 ACUTE BRONCHITIS DUE TO OTHER SPECIFIED ORGANISMS: ICD-10-CM

## 2023-09-26 DIAGNOSIS — Z87.09 PERSONAL HISTORY OF OTHER DISEASES OF THE RESPIRATORY SYSTEM: ICD-10-CM

## 2023-09-26 DIAGNOSIS — M25.511 PAIN IN RIGHT SHOULDER: ICD-10-CM

## 2023-09-26 PROCEDURE — G0008: CPT

## 2023-09-26 PROCEDURE — 99214 OFFICE O/P EST MOD 30 MIN: CPT | Mod: 25

## 2023-09-26 PROCEDURE — 96372 THER/PROPH/DIAG INJ SC/IM: CPT

## 2023-09-26 PROCEDURE — 90662 IIV NO PRSV INCREASED AG IM: CPT

## 2023-09-26 RX ORDER — ALBUTEROL SULFATE 90 UG/1
108 (90 BASE) INHALANT RESPIRATORY (INHALATION)
Qty: 1 | Refills: 1 | Status: DISCONTINUED | COMMUNITY
Start: 2021-05-17 | End: 2023-09-26

## 2023-09-26 RX ORDER — DEXAMETHASONE 6 MG/1
6 TABLET ORAL DAILY
Qty: 5 | Refills: 0 | Status: DISCONTINUED | COMMUNITY
Start: 2021-08-03 | End: 2023-09-26

## 2023-09-26 RX ORDER — DEXAMETHASONE SODIUM PHOSPHATE 4 MG/ML
4 INJECTION, SOLUTION INTRAMUSCULAR; INTRAVENOUS
Qty: 0 | Refills: 0 | Status: COMPLETED | OUTPATIENT
Start: 2023-09-26

## 2023-09-26 RX ORDER — METHYLPREDNISOLONE 4 MG/1
4 TABLET ORAL
Qty: 1 | Refills: 0 | Status: DISCONTINUED | COMMUNITY
Start: 2023-09-26 | End: 2023-09-26

## 2023-09-26 RX ADMIN — DEXAMETHASONE SODIUM PHOSPHATE 2 MG/ML: 4 INJECTION, SOLUTION INTRAMUSCULAR; INTRAVENOUS at 00:00

## 2023-11-29 ENCOUNTER — APPOINTMENT (OUTPATIENT)
Dept: FAMILY MEDICINE | Facility: CLINIC | Age: 83
End: 2023-11-29
Payer: MEDICARE

## 2023-11-29 VITALS
DIASTOLIC BLOOD PRESSURE: 110 MMHG | BODY MASS INDEX: 20.45 KG/M2 | HEART RATE: 120 BPM | SYSTOLIC BLOOD PRESSURE: 160 MMHG | HEIGHT: 72 IN | TEMPERATURE: 98.1 F | WEIGHT: 151 LBS | OXYGEN SATURATION: 98 %

## 2023-11-29 VITALS — DIASTOLIC BLOOD PRESSURE: 80 MMHG | SYSTOLIC BLOOD PRESSURE: 150 MMHG

## 2023-11-29 PROCEDURE — 99214 OFFICE O/P EST MOD 30 MIN: CPT | Mod: 95

## 2023-11-30 LAB
RAPID RVP RESULT: NOT DETECTED
SARS-COV-2 RNA PNL RESP NAA+PROBE: NOT DETECTED

## 2023-12-06 ENCOUNTER — APPOINTMENT (OUTPATIENT)
Dept: FAMILY MEDICINE | Facility: CLINIC | Age: 83
End: 2023-12-06

## 2023-12-12 ENCOUNTER — APPOINTMENT (OUTPATIENT)
Dept: FAMILY MEDICINE | Facility: CLINIC | Age: 83
End: 2023-12-12
Payer: MEDICARE

## 2023-12-12 VITALS
OXYGEN SATURATION: 96 % | WEIGHT: 156 LBS | BODY MASS INDEX: 21.13 KG/M2 | HEIGHT: 72 IN | TEMPERATURE: 97.3 F | HEART RATE: 86 BPM

## 2023-12-12 VITALS — SYSTOLIC BLOOD PRESSURE: 138 MMHG | DIASTOLIC BLOOD PRESSURE: 84 MMHG

## 2023-12-12 DIAGNOSIS — J30.2 OTHER SEASONAL ALLERGIC RHINITIS: ICD-10-CM

## 2023-12-12 DIAGNOSIS — J06.9 ACUTE UPPER RESPIRATORY INFECTION, UNSPECIFIED: ICD-10-CM

## 2023-12-12 PROCEDURE — 99214 OFFICE O/P EST MOD 30 MIN: CPT | Mod: 25

## 2023-12-12 PROCEDURE — 36415 COLL VENOUS BLD VENIPUNCTURE: CPT

## 2023-12-14 RX ORDER — OLOPATADINE HYDROCHLORIDE 7 MG/ML
0.7 SOLUTION OPHTHALMIC
Refills: 0 | Status: DISCONTINUED | COMMUNITY
Start: 2022-07-05 | End: 2023-12-14

## 2023-12-14 NOTE — HISTORY OF PRESENT ILLNESS
[FreeTextEntry1] : Patient is following up on Diabetes, Hypertension and Hyperlipidemia. Patient also needs medications renewals. Patient stated he has no issues or concerns. [de-identified] : Mr. JERMAN MCKEON is an 83-year male who presents following up DM, HTN, HLD. Patient is doing OK.

## 2023-12-20 LAB
ALBUMIN SERPL ELPH-MCNC: 4.7 G/DL
ALP BLD-CCNC: 84 U/L
ALT SERPL-CCNC: 15 U/L
ANION GAP SERPL CALC-SCNC: 14 MMOL/L
AST SERPL-CCNC: 22 U/L
BASOPHILS # BLD AUTO: 0.04 K/UL
BASOPHILS NFR BLD AUTO: 0.5 %
BILIRUB SERPL-MCNC: 0.6 MG/DL
BUN SERPL-MCNC: 17 MG/DL
CALCIUM SERPL-MCNC: 9.5 MG/DL
CHLORIDE SERPL-SCNC: 102 MMOL/L
CHOLEST SERPL-MCNC: 130 MG/DL
CO2 SERPL-SCNC: 24 MMOL/L
CREAT SERPL-MCNC: 1.09 MG/DL
EGFR: 67 ML/MIN/1.73M2
EOSINOPHIL # BLD AUTO: 0.2 K/UL
EOSINOPHIL NFR BLD AUTO: 2.4 %
ESTIMATED AVERAGE GLUCOSE: 194 MG/DL
GLUCOSE SERPL-MCNC: 176 MG/DL
HBA1C MFR BLD HPLC: 8.4 %
HCT VFR BLD CALC: 42.8 %
HDLC SERPL-MCNC: 59 MG/DL
HGB BLD-MCNC: 13.7 G/DL
IMM GRANULOCYTES NFR BLD AUTO: 0.4 %
LDLC SERPL CALC-MCNC: 54 MG/DL
LYMPHOCYTES # BLD AUTO: 1.16 K/UL
LYMPHOCYTES NFR BLD AUTO: 14 %
MAN DIFF?: NORMAL
MCHC RBC-ENTMCNC: 32 GM/DL
MCHC RBC-ENTMCNC: 32.2 PG
MCV RBC AUTO: 100.7 FL
MONOCYTES # BLD AUTO: 0.91 K/UL
MONOCYTES NFR BLD AUTO: 11 %
NEUTROPHILS # BLD AUTO: 5.94 K/UL
NEUTROPHILS NFR BLD AUTO: 71.7 %
NONHDLC SERPL-MCNC: 71 MG/DL
PLATELET # BLD AUTO: 239 K/UL
POTASSIUM SERPL-SCNC: 5 MMOL/L
PROT SERPL-MCNC: 6.9 G/DL
RBC # BLD: 4.25 M/UL
RBC # FLD: 13.2 %
SODIUM SERPL-SCNC: 139 MMOL/L
TRIGL SERPL-MCNC: 86 MG/DL
WBC # FLD AUTO: 8.28 K/UL

## 2024-01-16 ENCOUNTER — APPOINTMENT (OUTPATIENT)
Dept: FAMILY MEDICINE | Facility: CLINIC | Age: 84
End: 2024-01-16
Payer: MEDICARE

## 2024-01-16 VITALS
BODY MASS INDEX: 20.86 KG/M2 | TEMPERATURE: 97.2 F | HEART RATE: 100 BPM | DIASTOLIC BLOOD PRESSURE: 86 MMHG | WEIGHT: 154 LBS | SYSTOLIC BLOOD PRESSURE: 140 MMHG | HEIGHT: 72 IN | OXYGEN SATURATION: 99 %

## 2024-01-16 DIAGNOSIS — J40 BRONCHITIS, NOT SPECIFIED AS ACUTE OR CHRONIC: ICD-10-CM

## 2024-01-16 PROCEDURE — 99213 OFFICE O/P EST LOW 20 MIN: CPT

## 2024-01-16 RX ORDER — DEXAMETHASONE 6 MG/1
6 TABLET ORAL DAILY
Qty: 5 | Refills: 0 | Status: COMPLETED | COMMUNITY
Start: 2024-01-16 | End: 2024-01-21

## 2024-01-16 NOTE — PHYSICAL EXAM
[Normal] : no acute distress, well nourished, well developed and well-appearing [No Respiratory Distress] : no respiratory distress  [No Accessory Muscle Use] : no accessory muscle use [de-identified] : rhonchi bilaterally

## 2024-02-21 ENCOUNTER — APPOINTMENT (OUTPATIENT)
Dept: FAMILY MEDICINE | Facility: CLINIC | Age: 84
End: 2024-02-21
Payer: MEDICARE

## 2024-02-21 VITALS
BODY MASS INDEX: 20.72 KG/M2 | HEIGHT: 72 IN | OXYGEN SATURATION: 96 % | HEART RATE: 115 BPM | WEIGHT: 153 LBS | TEMPERATURE: 98 F

## 2024-02-21 DIAGNOSIS — J01.90 ACUTE SINUSITIS, UNSPECIFIED: ICD-10-CM

## 2024-02-21 PROCEDURE — 99213 OFFICE O/P EST LOW 20 MIN: CPT

## 2024-02-22 NOTE — PHYSICAL EXAM
[Supple] : supple [de-identified] : Periorbital edema [Normal] : normal rate, regular rhythm, normal S1 and S2 and no murmur heard [de-identified] : Tender over bilatarel maxillary sinuses

## 2024-02-22 NOTE — HISTORY OF PRESENT ILLNESS
[FreeTextEntry8] : Mr. JERMAN MCKEON is a 83 year male presenting with c/o head congestion, runny nose with yellowish/green discharge, cough and swollen eyes for 5 days. Patient states he gargled salty water with minor relief. He denies fever or chills.

## 2024-04-08 ENCOUNTER — APPOINTMENT (OUTPATIENT)
Dept: FAMILY MEDICINE | Facility: CLINIC | Age: 84
End: 2024-04-08
Payer: MEDICARE

## 2024-04-08 VITALS
BODY MASS INDEX: 20.59 KG/M2 | WEIGHT: 152 LBS | TEMPERATURE: 97.5 F | HEIGHT: 72 IN | OXYGEN SATURATION: 97 % | HEART RATE: 115 BPM

## 2024-04-08 DIAGNOSIS — J30.9 ALLERGIC RHINITIS, UNSPECIFIED: ICD-10-CM

## 2024-04-08 DIAGNOSIS — R05.3 CHRONIC COUGH: ICD-10-CM

## 2024-04-08 PROCEDURE — 99214 OFFICE O/P EST MOD 30 MIN: CPT

## 2024-04-08 PROCEDURE — G2211 COMPLEX E/M VISIT ADD ON: CPT

## 2024-04-08 RX ORDER — LEVOCETIRIZINE DIHYDROCHLORIDE 5 MG/1
5 TABLET ORAL DAILY
Qty: 90 | Refills: 1 | Status: ACTIVE | COMMUNITY
Start: 2023-12-14 | End: 1900-01-01

## 2024-04-08 RX ORDER — AMOXICILLIN AND CLAVULANATE POTASSIUM 875; 125 MG/1; MG/1
875-125 TABLET, COATED ORAL
Qty: 20 | Refills: 0 | Status: COMPLETED | COMMUNITY
Start: 2024-02-21 | End: 2024-04-08

## 2024-04-08 RX ORDER — AZITHROMYCIN 250 MG/1
250 TABLET, FILM COATED ORAL
Qty: 1 | Refills: 0 | Status: DISCONTINUED | COMMUNITY
Start: 2024-01-16 | End: 2024-04-08

## 2024-04-10 NOTE — HISTORY OF PRESENT ILLNESS
[FreeTextEntry8] : Patient is c/o persistent cough. He was treated for Bronchitis in Feb 2024. He states symptoms resolved with treatment and he was OK for sometime, but the cough has returned, it feels like a tickle in his throat causes him to cough. Review of medication compliance reveals patient is no longer taking antihistamine.  Patient is also requesting medication refills for chonic conditions.

## 2024-04-10 NOTE — PHYSICAL EXAM
[Supple] : supple [Pedal Pulses Present] : the pedal pulses are present [No Edema] : there was no peripheral edema [Normal] : no rash [Coordination Grossly Intact] : coordination grossly intact [No Focal Deficits] : no focal deficits [Normal Gait] : normal gait [Normal Affect] : the affect was normal [Normal Insight/Judgement] : insight and judgment were intact

## 2024-04-10 NOTE — PLAN
[FreeTextEntry1] : Patient was advised to return if symptoms do not subside with antihistamine and Fluticasone nose spray.

## 2024-04-12 ENCOUNTER — NON-APPOINTMENT (OUTPATIENT)
Age: 84
End: 2024-04-12

## 2024-04-12 ENCOUNTER — APPOINTMENT (OUTPATIENT)
Dept: FAMILY MEDICINE | Facility: CLINIC | Age: 84
End: 2024-04-12

## 2024-04-13 ENCOUNTER — TRANSCRIPTION ENCOUNTER (OUTPATIENT)
Age: 84
End: 2024-04-13

## 2024-04-13 ENCOUNTER — INPATIENT (INPATIENT)
Facility: HOSPITAL | Age: 84
LOS: 6 days | Discharge: HOME | DRG: 189 | End: 2024-04-20
Attending: FAMILY MEDICINE | Admitting: HOSPITALIST
Payer: MEDICARE

## 2024-04-13 VITALS
SYSTOLIC BLOOD PRESSURE: 179 MMHG | RESPIRATION RATE: 20 BRPM | DIASTOLIC BLOOD PRESSURE: 78 MMHG | WEIGHT: 160.06 LBS | TEMPERATURE: 99 F | HEART RATE: 97 BPM | OXYGEN SATURATION: 95 %

## 2024-04-13 DIAGNOSIS — J96.01 ACUTE RESPIRATORY FAILURE WITH HYPOXIA: ICD-10-CM

## 2024-04-13 LAB
ALBUMIN SERPL ELPH-MCNC: 3.7 G/DL — SIGNIFICANT CHANGE UP (ref 3.3–5.2)
ALP SERPL-CCNC: 73 U/L — SIGNIFICANT CHANGE UP (ref 40–120)
ALT FLD-CCNC: 16 U/L — SIGNIFICANT CHANGE UP
ANION GAP SERPL CALC-SCNC: 15 MMOL/L — SIGNIFICANT CHANGE UP (ref 5–17)
AST SERPL-CCNC: 29 U/L — SIGNIFICANT CHANGE UP
BASE EXCESS BLDV CALC-SCNC: -3.1 MMOL/L — LOW (ref -2–3)
BASOPHILS # BLD AUTO: 0.07 K/UL — SIGNIFICANT CHANGE UP (ref 0–0.2)
BASOPHILS NFR BLD AUTO: 0.9 % — SIGNIFICANT CHANGE UP (ref 0–2)
BILIRUB SERPL-MCNC: 0.2 MG/DL — LOW (ref 0.4–2)
BUN SERPL-MCNC: 19.6 MG/DL — SIGNIFICANT CHANGE UP (ref 8–20)
CA-I SERPL-SCNC: 1.18 MMOL/L — SIGNIFICANT CHANGE UP (ref 1.15–1.33)
CALCIUM SERPL-MCNC: 8.9 MG/DL — SIGNIFICANT CHANGE UP (ref 8.4–10.5)
CHLORIDE BLDV-SCNC: 105 MMOL/L — SIGNIFICANT CHANGE UP (ref 96–108)
CHLORIDE SERPL-SCNC: 103 MMOL/L — SIGNIFICANT CHANGE UP (ref 96–108)
CO2 SERPL-SCNC: 19 MMOL/L — LOW (ref 22–29)
CREAT SERPL-MCNC: 0.79 MG/DL — SIGNIFICANT CHANGE UP (ref 0.5–1.3)
EGFR: 88 ML/MIN/1.73M2 — SIGNIFICANT CHANGE UP
EOSINOPHIL # BLD AUTO: 0.82 K/UL — HIGH (ref 0–0.5)
EOSINOPHIL NFR BLD AUTO: 11.1 % — HIGH (ref 0–6)
FLUAV AG NPH QL: SIGNIFICANT CHANGE UP
FLUBV AG NPH QL: SIGNIFICANT CHANGE UP
GAS PNL BLDV: 137 MMOL/L — SIGNIFICANT CHANGE UP (ref 136–145)
GAS PNL BLDV: SIGNIFICANT CHANGE UP
GLUCOSE BLDV-MCNC: 105 MG/DL — HIGH (ref 70–99)
GLUCOSE SERPL-MCNC: 106 MG/DL — HIGH (ref 70–99)
HCO3 BLDV-SCNC: 21 MMOL/L — LOW (ref 22–29)
HCT VFR BLD CALC: 33.9 % — LOW (ref 39–50)
HCT VFR BLDA CALC: 35 % — SIGNIFICANT CHANGE UP
HGB BLD CALC-MCNC: 11.6 G/DL — LOW (ref 12.6–17.4)
HGB BLD-MCNC: 11.3 G/DL — LOW (ref 13–17)
IMM GRANULOCYTES NFR BLD AUTO: 0.7 % — SIGNIFICANT CHANGE UP (ref 0–0.9)
LACTATE BLDV-MCNC: 1.2 MMOL/L — SIGNIFICANT CHANGE UP (ref 0.5–2)
LYMPHOCYTES # BLD AUTO: 1.28 K/UL — SIGNIFICANT CHANGE UP (ref 1–3.3)
LYMPHOCYTES # BLD AUTO: 17.3 % — SIGNIFICANT CHANGE UP (ref 13–44)
MCHC RBC-ENTMCNC: 29.7 PG — SIGNIFICANT CHANGE UP (ref 27–34)
MCHC RBC-ENTMCNC: 33.3 GM/DL — SIGNIFICANT CHANGE UP (ref 32–36)
MCV RBC AUTO: 89.2 FL — SIGNIFICANT CHANGE UP (ref 80–100)
MONOCYTES # BLD AUTO: 1.16 K/UL — HIGH (ref 0–0.9)
MONOCYTES NFR BLD AUTO: 15.6 % — HIGH (ref 2–14)
NEUTROPHILS # BLD AUTO: 4.04 K/UL — SIGNIFICANT CHANGE UP (ref 1.8–7.4)
NEUTROPHILS NFR BLD AUTO: 54.4 % — SIGNIFICANT CHANGE UP (ref 43–77)
PCO2 BLDV: 31 MMHG — LOW (ref 42–55)
PH BLDV: 7.44 — HIGH (ref 7.32–7.43)
PLATELET # BLD AUTO: 247 K/UL — SIGNIFICANT CHANGE UP (ref 150–400)
PO2 BLDV: 179 MMHG — HIGH (ref 25–45)
POTASSIUM BLDV-SCNC: 4.6 MMOL/L — SIGNIFICANT CHANGE UP (ref 3.5–5.1)
POTASSIUM SERPL-MCNC: 5.1 MMOL/L — SIGNIFICANT CHANGE UP (ref 3.5–5.3)
POTASSIUM SERPL-SCNC: 5.1 MMOL/L — SIGNIFICANT CHANGE UP (ref 3.5–5.3)
PROT SERPL-MCNC: 6.4 G/DL — LOW (ref 6.6–8.7)
RBC # BLD: 3.8 M/UL — LOW (ref 4.2–5.8)
RBC # FLD: 14.6 % — HIGH (ref 10.3–14.5)
RSV RNA NPH QL NAA+NON-PROBE: SIGNIFICANT CHANGE UP
SAO2 % BLDV: 99.1 % — SIGNIFICANT CHANGE UP
SARS-COV-2 RNA SPEC QL NAA+PROBE: SIGNIFICANT CHANGE UP
SODIUM SERPL-SCNC: 137 MMOL/L — SIGNIFICANT CHANGE UP (ref 135–145)
WBC # BLD: 7.42 K/UL — SIGNIFICANT CHANGE UP (ref 3.8–10.5)
WBC # FLD AUTO: 7.42 K/UL — SIGNIFICANT CHANGE UP (ref 3.8–10.5)

## 2024-04-13 PROCEDURE — 71046 X-RAY EXAM CHEST 2 VIEWS: CPT | Mod: 26

## 2024-04-13 PROCEDURE — 99223 1ST HOSP IP/OBS HIGH 75: CPT

## 2024-04-13 PROCEDURE — 99285 EMERGENCY DEPT VISIT HI MDM: CPT

## 2024-04-13 RX ORDER — IPRATROPIUM/ALBUTEROL SULFATE 18-103MCG
3 AEROSOL WITH ADAPTER (GRAM) INHALATION ONCE
Refills: 0 | Status: COMPLETED | OUTPATIENT
Start: 2024-04-13 | End: 2024-04-13

## 2024-04-13 RX ORDER — SODIUM CHLORIDE 9 MG/ML
1000 INJECTION, SOLUTION INTRAVENOUS
Refills: 0 | Status: DISCONTINUED | OUTPATIENT
Start: 2024-04-13 | End: 2024-04-20

## 2024-04-13 RX ORDER — ALLOPURINOL 300 MG
100 TABLET ORAL DAILY
Refills: 0 | Status: DISCONTINUED | OUTPATIENT
Start: 2024-04-13 | End: 2024-04-20

## 2024-04-13 RX ORDER — DEXAMETHASONE 0.5 MG/5ML
6 ELIXIR ORAL ONCE
Refills: 0 | Status: COMPLETED | OUTPATIENT
Start: 2024-04-13 | End: 2024-04-13

## 2024-04-13 RX ORDER — LANOLIN ALCOHOL/MO/W.PET/CERES
3 CREAM (GRAM) TOPICAL AT BEDTIME
Refills: 0 | Status: DISCONTINUED | OUTPATIENT
Start: 2024-04-13 | End: 2024-04-20

## 2024-04-13 RX ORDER — FUROSEMIDE 40 MG
20 TABLET ORAL
Refills: 0 | Status: DISCONTINUED | OUTPATIENT
Start: 2024-04-13 | End: 2024-04-14

## 2024-04-13 RX ORDER — INSULIN LISPRO 100/ML
VIAL (ML) SUBCUTANEOUS
Refills: 0 | Status: DISCONTINUED | OUTPATIENT
Start: 2024-04-13 | End: 2024-04-20

## 2024-04-13 RX ORDER — DEXTROSE 50 % IN WATER 50 %
25 SYRINGE (ML) INTRAVENOUS ONCE
Refills: 0 | Status: DISCONTINUED | OUTPATIENT
Start: 2024-04-13 | End: 2024-04-20

## 2024-04-13 RX ORDER — AZITHROMYCIN 500 MG/1
500 TABLET, FILM COATED ORAL ONCE
Refills: 0 | Status: COMPLETED | OUTPATIENT
Start: 2024-04-13 | End: 2024-04-13

## 2024-04-13 RX ORDER — GLUCAGON INJECTION, SOLUTION 0.5 MG/.1ML
1 INJECTION, SOLUTION SUBCUTANEOUS ONCE
Refills: 0 | Status: DISCONTINUED | OUTPATIENT
Start: 2024-04-13 | End: 2024-04-20

## 2024-04-13 RX ORDER — SODIUM CHLORIDE 9 MG/ML
3 INJECTION INTRAMUSCULAR; INTRAVENOUS; SUBCUTANEOUS EVERY 8 HOURS
Refills: 0 | Status: DISCONTINUED | OUTPATIENT
Start: 2024-04-13 | End: 2024-04-20

## 2024-04-13 RX ORDER — DEXTROSE 50 % IN WATER 50 %
15 SYRINGE (ML) INTRAVENOUS ONCE
Refills: 0 | Status: DISCONTINUED | OUTPATIENT
Start: 2024-04-13 | End: 2024-04-20

## 2024-04-13 RX ORDER — ONDANSETRON 8 MG/1
4 TABLET, FILM COATED ORAL EVERY 8 HOURS
Refills: 0 | Status: DISCONTINUED | OUTPATIENT
Start: 2024-04-13 | End: 2024-04-20

## 2024-04-13 RX ORDER — AMLODIPINE BESYLATE 2.5 MG/1
5 TABLET ORAL ONCE
Refills: 0 | Status: COMPLETED | OUTPATIENT
Start: 2024-04-13 | End: 2024-04-13

## 2024-04-13 RX ORDER — CEFTRIAXONE 500 MG/1
1000 INJECTION, POWDER, FOR SOLUTION INTRAMUSCULAR; INTRAVENOUS ONCE
Refills: 0 | Status: COMPLETED | OUTPATIENT
Start: 2024-04-13 | End: 2024-04-13

## 2024-04-13 RX ORDER — DEXTROSE 10 % IN WATER 10 %
125 INTRAVENOUS SOLUTION INTRAVENOUS ONCE
Refills: 0 | Status: DISCONTINUED | OUTPATIENT
Start: 2024-04-13 | End: 2024-04-20

## 2024-04-13 RX ORDER — LISINOPRIL 2.5 MG/1
20 TABLET ORAL DAILY
Refills: 0 | Status: DISCONTINUED | OUTPATIENT
Start: 2024-04-13 | End: 2024-04-16

## 2024-04-13 RX ORDER — AMLODIPINE BESYLATE 2.5 MG/1
5 TABLET ORAL DAILY
Refills: 0 | Status: DISCONTINUED | OUTPATIENT
Start: 2024-04-13 | End: 2024-04-17

## 2024-04-13 RX ORDER — DEXTROSE 50 % IN WATER 50 %
12.5 SYRINGE (ML) INTRAVENOUS ONCE
Refills: 0 | Status: DISCONTINUED | OUTPATIENT
Start: 2024-04-13 | End: 2024-04-20

## 2024-04-13 RX ORDER — IPRATROPIUM/ALBUTEROL SULFATE 18-103MCG
3 AEROSOL WITH ADAPTER (GRAM) INHALATION EVERY 6 HOURS
Refills: 0 | Status: DISCONTINUED | OUTPATIENT
Start: 2024-04-13 | End: 2024-04-14

## 2024-04-13 RX ORDER — SIMVASTATIN 20 MG/1
40 TABLET, FILM COATED ORAL AT BEDTIME
Refills: 0 | Status: DISCONTINUED | OUTPATIENT
Start: 2024-04-13 | End: 2024-04-20

## 2024-04-13 RX ORDER — ACETAMINOPHEN 500 MG
650 TABLET ORAL EVERY 6 HOURS
Refills: 0 | Status: DISCONTINUED | OUTPATIENT
Start: 2024-04-13 | End: 2024-04-20

## 2024-04-13 RX ADMIN — CEFTRIAXONE 1000 MILLIGRAM(S): 500 INJECTION, POWDER, FOR SOLUTION INTRAMUSCULAR; INTRAVENOUS at 23:26

## 2024-04-13 RX ADMIN — AMLODIPINE BESYLATE 5 MILLIGRAM(S): 2.5 TABLET ORAL at 23:26

## 2024-04-13 RX ADMIN — Medication 3 MILLILITER(S): at 20:22

## 2024-04-13 RX ADMIN — Medication 6 MILLIGRAM(S): at 20:22

## 2024-04-13 RX ADMIN — AZITHROMYCIN 255 MILLIGRAM(S): 500 TABLET, FILM COATED ORAL at 23:26

## 2024-04-13 NOTE — ED PROVIDER NOTE - CLINICAL SUMMARY MEDICAL DECISION MAKING FREE TEXT BOX
Coarse lungs and wheezing with mild resting hypoxia, had some symptomatic improvement with bronchodilator but remains hypoxic, now lung exam more focal in the Left upper. Covered for a clinical pneumonia however given symptoms a new diagnosis of CHF remains in the differential. Will need admission for futher evaluation and optimization.

## 2024-04-13 NOTE — ED ADULT TRIAGE NOTE - CHIEF COMPLAINT QUOTE
Sent in form urgent care for cough, congestion and confusion for the past 3 days. PT also reports pain to the left lower leg with swelling.

## 2024-04-13 NOTE — ED ADULT NURSE NOTE - NSFALLHARMRISKINTERV_ED_ALL_ED
Assistance OOB with selected safe patient handling equipment if applicable/Assistance with ambulation/Communicate risk of Fall with Harm to all staff, patient, and family/Monitor gait and stability/Provide visual cue: red socks, yellow wristband, yellow gown, etc/Reinforce activity limits and safety measures with patient and family/Bed in lowest position, wheels locked, appropriate side rails in place/Call bell, personal items and telephone in reach/Instruct patient to call for assistance before getting out of bed/chair/stretcher/Non-slip footwear applied when patient is off stretcher/[place] to call system/Physically safe environment - no spills, clutter or unnecessary equipment/Purposeful Proactive Rounding/Room/bathroom lighting operational, light cord in reach

## 2024-04-13 NOTE — ED ADULT NURSE NOTE - OBJECTIVE STATEMENT
assumed care of pt at 1740. pt reports cough, congestion, SOB with LLE pain since Monday. pt saw PCP Monday, was placed on levocetirizine and Flonase with no relief. denies chest pain, dizziness. as per daughter at bedside, pt has been more confused over the last 3 days. anox4 [place] at this time. rr even and unlabored. denies fevers. pt educated on plan of care, pt able to successfully teach back plan of care to RN, RN will continue to reeducate pt during hospital stay.

## 2024-04-13 NOTE — ED PROVIDER NOTE - OBJECTIVE STATEMENT
83yom w/ DM has been having several weeks of cough, worst at night when laying flat, some associated congestion. Was started on antihistamine and an inhaler by primary with no relief. Also with several weeks of mild LE edema, L>R. No exertional dyspnea or hx of CHF. No chest pain. No fevers, chills or systemic symptoms.

## 2024-04-13 NOTE — ED ADULT NURSE REASSESSMENT NOTE - NS ED NURSE REASSESS COMMENT FT1
assumed care of pt @ 1930 from previous RN Danny AMEZQUITA. Pt breathing even and unlabored at this time, no acute distress noted. Pt resting comfortably, taken to XR. Pt able to make needs known, has no complaints at this time. Family at bedside. Stretcher in lowest position, wheels locked, call bell within reach.

## 2024-04-14 ENCOUNTER — RESULT REVIEW (OUTPATIENT)
Age: 84
End: 2024-04-14

## 2024-04-14 DIAGNOSIS — E78.5 HYPERLIPIDEMIA, UNSPECIFIED: ICD-10-CM

## 2024-04-14 DIAGNOSIS — I50.9 HEART FAILURE, UNSPECIFIED: ICD-10-CM

## 2024-04-14 DIAGNOSIS — I10 ESSENTIAL (PRIMARY) HYPERTENSION: ICD-10-CM

## 2024-04-14 LAB
A1C WITH ESTIMATED AVERAGE GLUCOSE RESULT: 7.2 % — HIGH (ref 4–5.6)
ANION GAP SERPL CALC-SCNC: 15 MMOL/L — SIGNIFICANT CHANGE UP (ref 5–17)
BUN SERPL-MCNC: 21.3 MG/DL — HIGH (ref 8–20)
CALCIUM SERPL-MCNC: 8.6 MG/DL — SIGNIFICANT CHANGE UP (ref 8.4–10.5)
CHLORIDE SERPL-SCNC: 103 MMOL/L — SIGNIFICANT CHANGE UP (ref 96–108)
CO2 SERPL-SCNC: 19 MMOL/L — LOW (ref 22–29)
CREAT SERPL-MCNC: 0.93 MG/DL — SIGNIFICANT CHANGE UP (ref 0.5–1.3)
EGFR: 81 ML/MIN/1.73M2 — SIGNIFICANT CHANGE UP
ESTIMATED AVERAGE GLUCOSE: 160 MG/DL — HIGH (ref 68–114)
GLUCOSE BLDC GLUCOMTR-MCNC: 138 MG/DL — HIGH (ref 70–99)
GLUCOSE BLDC GLUCOMTR-MCNC: 209 MG/DL — HIGH (ref 70–99)
GLUCOSE BLDC GLUCOMTR-MCNC: 212 MG/DL — HIGH (ref 70–99)
GLUCOSE BLDC GLUCOMTR-MCNC: 223 MG/DL — HIGH (ref 70–99)
GLUCOSE BLDC GLUCOMTR-MCNC: 417 MG/DL — HIGH (ref 70–99)
GLUCOSE SERPL-MCNC: 293 MG/DL — HIGH (ref 70–99)
HCT VFR BLD CALC: 34.5 % — LOW (ref 39–50)
HGB BLD-MCNC: 10.9 G/DL — LOW (ref 13–17)
MAGNESIUM SERPL-MCNC: 1.5 MG/DL — LOW (ref 1.6–2.6)
MCHC RBC-ENTMCNC: 28.5 PG — SIGNIFICANT CHANGE UP (ref 27–34)
MCHC RBC-ENTMCNC: 31.6 GM/DL — LOW (ref 32–36)
MCV RBC AUTO: 90.1 FL — SIGNIFICANT CHANGE UP (ref 80–100)
PHOSPHATE SERPL-MCNC: 3.1 MG/DL — SIGNIFICANT CHANGE UP (ref 2.4–4.7)
PLATELET # BLD AUTO: 248 K/UL — SIGNIFICANT CHANGE UP (ref 150–400)
POTASSIUM SERPL-MCNC: 4.7 MMOL/L — SIGNIFICANT CHANGE UP (ref 3.5–5.3)
POTASSIUM SERPL-SCNC: 4.7 MMOL/L — SIGNIFICANT CHANGE UP (ref 3.5–5.3)
RBC # BLD: 3.83 M/UL — LOW (ref 4.2–5.8)
RBC # FLD: 14.6 % — HIGH (ref 10.3–14.5)
SODIUM SERPL-SCNC: 137 MMOL/L — SIGNIFICANT CHANGE UP (ref 135–145)
WBC # BLD: 6.13 K/UL — SIGNIFICANT CHANGE UP (ref 3.8–10.5)
WBC # FLD AUTO: 6.13 K/UL — SIGNIFICANT CHANGE UP (ref 3.8–10.5)

## 2024-04-14 PROCEDURE — 93010 ELECTROCARDIOGRAM REPORT: CPT

## 2024-04-14 PROCEDURE — 99233 SBSQ HOSP IP/OBS HIGH 50: CPT

## 2024-04-14 PROCEDURE — 93306 TTE W/DOPPLER COMPLETE: CPT | Mod: 26

## 2024-04-14 PROCEDURE — 99497 ADVNCD CARE PLAN 30 MIN: CPT

## 2024-04-14 PROCEDURE — 99223 1ST HOSP IP/OBS HIGH 75: CPT

## 2024-04-14 RX ORDER — HEPARIN SODIUM 5000 [USP'U]/ML
5000 INJECTION INTRAVENOUS; SUBCUTANEOUS EVERY 8 HOURS
Refills: 0 | Status: DISCONTINUED | OUTPATIENT
Start: 2024-04-14 | End: 2024-04-20

## 2024-04-14 RX ORDER — MAGNESIUM SULFATE 500 MG/ML
2 VIAL (ML) INJECTION ONCE
Refills: 0 | Status: COMPLETED | OUTPATIENT
Start: 2024-04-14 | End: 2024-04-14

## 2024-04-14 RX ORDER — MAGNESIUM SULFATE 500 MG/ML
2 VIAL (ML) INJECTION
Refills: 0 | Status: COMPLETED | OUTPATIENT
Start: 2024-04-14 | End: 2024-04-14

## 2024-04-14 RX ORDER — FUROSEMIDE 40 MG
40 TABLET ORAL DAILY
Refills: 0 | Status: DISCONTINUED | OUTPATIENT
Start: 2024-04-15 | End: 2024-04-16

## 2024-04-14 RX ORDER — FUROSEMIDE 40 MG
20 TABLET ORAL ONCE
Refills: 0 | Status: COMPLETED | OUTPATIENT
Start: 2024-04-14 | End: 2024-04-14

## 2024-04-14 RX ORDER — IPRATROPIUM/ALBUTEROL SULFATE 18-103MCG
3 AEROSOL WITH ADAPTER (GRAM) INHALATION EVERY 6 HOURS
Refills: 0 | Status: DISCONTINUED | OUTPATIENT
Start: 2024-04-14 | End: 2024-04-20

## 2024-04-14 RX ADMIN — Medication 3 MILLILITER(S): at 17:04

## 2024-04-14 RX ADMIN — Medication 25 GRAM(S): at 11:41

## 2024-04-14 RX ADMIN — Medication 25 GRAM(S): at 08:02

## 2024-04-14 RX ADMIN — Medication 3 MILLILITER(S): at 21:02

## 2024-04-14 RX ADMIN — SIMVASTATIN 40 MILLIGRAM(S): 20 TABLET, FILM COATED ORAL at 21:37

## 2024-04-14 RX ADMIN — Medication 12: at 09:34

## 2024-04-14 RX ADMIN — SODIUM CHLORIDE 3 MILLILITER(S): 9 INJECTION INTRAMUSCULAR; INTRAVENOUS; SUBCUTANEOUS at 13:23

## 2024-04-14 RX ADMIN — Medication 20 MILLIGRAM(S): at 06:44

## 2024-04-14 RX ADMIN — Medication 100 MILLIGRAM(S): at 11:31

## 2024-04-14 RX ADMIN — Medication 4: at 21:38

## 2024-04-14 RX ADMIN — Medication 3 MILLIGRAM(S): at 21:37

## 2024-04-14 RX ADMIN — HEPARIN SODIUM 5000 UNIT(S): 5000 INJECTION INTRAVENOUS; SUBCUTANEOUS at 21:37

## 2024-04-14 RX ADMIN — Medication 4: at 18:00

## 2024-04-14 RX ADMIN — Medication 20 MILLIGRAM(S): at 18:11

## 2024-04-14 RX ADMIN — SODIUM CHLORIDE 3 MILLILITER(S): 9 INJECTION INTRAMUSCULAR; INTRAVENOUS; SUBCUTANEOUS at 21:49

## 2024-04-14 RX ADMIN — Medication 25 GRAM(S): at 09:35

## 2024-04-14 RX ADMIN — SODIUM CHLORIDE 3 MILLILITER(S): 9 INJECTION INTRAMUSCULAR; INTRAVENOUS; SUBCUTANEOUS at 07:22

## 2024-04-14 RX ADMIN — LISINOPRIL 20 MILLIGRAM(S): 2.5 TABLET ORAL at 06:45

## 2024-04-14 RX ADMIN — AMLODIPINE BESYLATE 5 MILLIGRAM(S): 2.5 TABLET ORAL at 06:44

## 2024-04-14 NOTE — PROGRESS NOTE ADULT - SUBJECTIVE AND OBJECTIVE BOX
Hospitalist Progress Note    Chief Complaint:  New Onset CHF    SUBJECTIVE / OVERNIGHT EVENTS:  No events overnight, patient seen at bedside, in NAD, complaining of mild SOB. Patient denies chest pain, abd pain, N/V, fever, chills, dysuria or any other complaints. All remainder ROS negative.     MEDICATIONS  (STANDING):  allopurinol 100 milliGRAM(s) Oral daily  amLODIPine   Tablet 5 milliGRAM(s) Oral daily  dextrose 10% Bolus 125 milliLiter(s) IV Bolus once  dextrose 5%. 1000 milliLiter(s) (50 mL/Hr) IV Continuous <Continuous>  dextrose 5%. 1000 milliLiter(s) (100 mL/Hr) IV Continuous <Continuous>  dextrose 50% Injectable 12.5 Gram(s) IV Push once  dextrose 50% Injectable 25 Gram(s) IV Push once  furosemide   Injectable 20 milliGRAM(s) IV Push two times a day  glucagon  Injectable 1 milliGRAM(s) IntraMuscular once  insulin lispro (ADMELOG) corrective regimen sliding scale   SubCutaneous Before meals and at bedtime  lisinopril 20 milliGRAM(s) Oral daily  magnesium sulfate  IVPB 2 Gram(s) IV Intermittent every 2 hours  simvastatin 40 milliGRAM(s) Oral at bedtime  sodium chloride 0.9% lock flush 3 milliLiter(s) IV Push every 8 hours    MEDICATIONS  (PRN):  acetaminophen     Tablet .. 650 milliGRAM(s) Oral every 6 hours PRN Temp greater or equal to 38C (100.4F), Mild Pain (1 - 3)  albuterol/ipratropium for Nebulization 3 milliLiter(s) Nebulizer every 6 hours PRN Shortness of Breath and/or Wheezing  aluminum hydroxide/magnesium hydroxide/simethicone Suspension 30 milliLiter(s) Oral every 4 hours PRN Dyspepsia  dextrose Oral Gel 15 Gram(s) Oral once PRN Blood Glucose LESS THAN 70 milliGRAM(s)/deciliter  melatonin 3 milliGRAM(s) Oral at bedtime PRN Insomnia  ondansetron Injectable 4 milliGRAM(s) IV Push every 8 hours PRN Nausea and/or Vomiting        I&O's Summary      PHYSICAL EXAM:  Vital Signs Last 24 Hrs  T(C): 36.6 (14 Apr 2024 07:05), Max: 37.3 (13 Apr 2024 17:33)  T(F): 97.8 (14 Apr 2024 07:05), Max: 99.1 (13 Apr 2024 17:33)  HR: 104 (14 Apr 2024 07:05) (78 - 109)  BP: 129/75 (14 Apr 2024 07:05) (117/72 - 179/78)  BP(mean): 104 (14 Apr 2024 00:45) (104 - 104)  RR: 18 (14 Apr 2024 07:05) (18 - 20)  SpO2: 95% (14 Apr 2024 07:05) (88% - 98%)    Parameters below as of 14 Apr 2024 07:05  Patient On (Oxygen Delivery Method): nasal cannula  O2 Flow (L/min): 2          CONSTITUTIONAL: NAD,  well-groomed  HEENMT: NC/AT, PERRLA, EOMI, Moist oral mucosa, no pharyngeal injection or exudates; normal dentition  RESPIRATORY: BLAE, bibasilar rhonci  CARDIOVASCULAR: S1/S2+, RRR, +Systolic Murmur present  ABDOMEN: Soft, NT/ND, BS+, No guarding  MSK:  Moves limbs with purpose and direction; no clubbing or cyanosis of digits; no joint swelling or tenderness to palpation  PSYCH: normal mood and affect  NEUROLOGY: AAOx4, CN 2-12 are intact and symmetric; no gross sensory deficits;   SKIN: Warm, Dry, No rashes; no palpable lesions    LABS:                        10.9   6.13  )-----------( 248      ( 14 Apr 2024 03:05 )             34.5     04-14    137  |  103  |  21.3<H>  ----------------------------<  293<H>  4.7   |  19.0<L>  |  0.93    Ca    8.6      14 Apr 2024 03:05  Phos  3.1     04-14  Mg     1.5     04-14    TPro  6.4<L>  /  Alb  3.7  /  TBili  0.2<L>  /  DBili  x   /  AST  29  /  ALT  16  /  AlkPhos  73  04-13          Urinalysis Basic - ( 14 Apr 2024 03:05 )    Color: x / Appearance: x / SG: x / pH: x  Gluc: 293 mg/dL / Ketone: x  / Bili: x / Urobili: x   Blood: x / Protein: x / Nitrite: x   Leuk Esterase: x / RBC: x / WBC x   Sq Epi: x / Non Sq Epi: x / Bacteria: x        CAPILLARY BLOOD GLUCOSE      POCT Blood Glucose.: 417 mg/dL (14 Apr 2024 09:32)        RADIOLOGY & ADDITIONAL TESTS:  Results Reviewed: Y  Imaging Personally Reviewed: N  Electrocardiogram Personally Reviewed: CORAL

## 2024-04-14 NOTE — PROGRESS NOTE ADULT - ASSESSMENT
84 y/o male with new hypoxia, orthopnea, LE edema, ANUJ, Hx of HTN, HLD, DM-2, Gout    New onset CHF, unknown subtype:  -Patient constellation of symptoms concerning for new onset CHF  -No fever, no leucocytosis, no pleurisy or sputum  -IV Diuresis w/ Lasix  -F/U EKG  -Cont. ACE, hold BB pending 2D echo  -Daily weights, daily BMP/lytes  -Cardiology eval - TTE, ischemic w/u likely tomorrow  -OOB, ambulate, fall risk  -DVT-P w/ vc boots sub Q heparin    ANUJ:  -Exam c/w severe AS murmur   -Check 2D echo  -Avoid hypotension     HTN:  -Cont. O/P regimen  -DASH diet    HLD:  -Statin    DM-2:  -Hold oral agents  -ADA diet  -Accu checks AC/HS w/ coverage    Gout:  -Cont. Allopurinol     Discussed with Patient, and family at bedside who agrees with above plan of care

## 2024-04-14 NOTE — PROGRESS NOTE ADULT - CONVERSATION DETAILS
Due to age, comorbidities and presence of Living will and HCP, discussed MOLST and GOC w/ patient and daughter (HCP) at bedside. Explained all facets of GOC. Patient wishes for DNR/DNI w/ limited interventions, documented in MOLST form. Patient is now DNR/DNI/Trial of NIV. MOLST completed, signed by patient, witnessed by RN and HCP, placed in chart.

## 2024-04-14 NOTE — CONSULT NOTE ADULT - NS ATTEND AMEND GEN_ALL_CORE FT
-      83M hx  HTN, HLD, DMII, and gout who presented to [facility] from and urgent care due to worsening shortness of breath and hypoxia over the last several weeks. Pt was at   and  was noted to be hypoxic. Admits LE edema.    Acute CHF  - Acutely decompensated CHF, unknown EF.   - Patient also with systolic ejection murmur on exam   - Serial CE, EKG, TTE, telemetry  - IV diuretics  - Eventual ischemia MCKEON     Hypertension.   - Controlled.   - Cont sofia meds  - Monitor BP

## 2024-04-14 NOTE — H&P ADULT - ASSESSMENT
84 y/o male with new hypoxia, orthopnea, LE edema, ANUJ, Hx of HTN, HLD, DM-2, Gout    New onset CHF:  -Patient constellation of symptoms concerning for new onset CHF  -No fever, no leucocytosis, no pleurisy or sputum  -hold Abx for now  -Admit to tele, start on lasix  -F/U EKG  -Cont. ACE, hold BB pending 2D echo  -Daily weights, daily BMP/lytes  -Cardiology eval  -OOB, ambulate, fall risk  -DVT-P w/ vc boots sub Q heparin    ANUJ:  -Exam c/w severe AS murmur   -Check 2D echo  -Avoid hypotension     HTN:  -Cont. O/P regimen  -DASH diet    HLD:  -Statin    DM-2:  -Hold oral agents  -ADA diet  -Accu checks AC/HS w/ coverage    Gout:  -Cont. Allopurinol     Discussed with ED staff, Patient, and family at bedside who agrees with above plan of care

## 2024-04-14 NOTE — PATIENT PROFILE ADULT - FALL HARM RISK - HARM RISK INTERVENTIONS

## 2024-04-14 NOTE — H&P ADULT - HISTORY OF PRESENT ILLNESS
84 y/o male sent in from urgent care for hypoxia after was recently seen by PMD for c/o cough/SOB. No recent travel, sick contacts or changes in medications. No hx of PNA/aspiration and no reports of coughing with eating. Patient hx of sig for DM-2, HTN, HLD, Gout. Patient able to do most of his ADLs without assistive devices, no reports of falls. He was put on Flonase and Zyrtec by his PMD before going to  today. In the ED noted to by hypoxic to the high 80s at rest. No fever, chills, N/V or CP. Also admits to intermittent LE edema, and orthopnea. He was given steroids/nebs in ED with no improvement. CXR with PVC, and small right pleural effusion. No leucocytosis or shift. Flu/Covid/RSV neg. Patient also noted to have harsh ANUJ with no hx of murmur reported by family. Denies any other complaints at this time.

## 2024-04-15 DIAGNOSIS — I35.0 NONRHEUMATIC AORTIC (VALVE) STENOSIS: ICD-10-CM

## 2024-04-15 LAB
ALBUMIN SERPL ELPH-MCNC: 4.9 G/DL
ALP BLD-CCNC: 89 U/L
ALT SERPL-CCNC: 16 U/L
ANION GAP SERPL CALC-SCNC: 14 MMOL/L — SIGNIFICANT CHANGE UP (ref 5–17)
ANION GAP SERPL CALC-SCNC: 16 MMOL/L
AST SERPL-CCNC: 25 U/L
BILIRUB SERPL-MCNC: 0.4 MG/DL
BUN SERPL-MCNC: 20 MG/DL
BUN SERPL-MCNC: 30 MG/DL — HIGH (ref 8–20)
CALCIUM SERPL-MCNC: 8.2 MG/DL — LOW (ref 8.4–10.5)
CALCIUM SERPL-MCNC: 9.6 MG/DL
CHLORIDE SERPL-SCNC: 102 MMOL/L — SIGNIFICANT CHANGE UP (ref 96–108)
CHLORIDE SERPL-SCNC: 105 MMOL/L
CO2 SERPL-SCNC: 20 MMOL/L
CO2 SERPL-SCNC: 22 MMOL/L — SIGNIFICANT CHANGE UP (ref 22–29)
CREAT SERPL-MCNC: 1.15 MG/DL
CREAT SERPL-MCNC: 1.26 MG/DL — SIGNIFICANT CHANGE UP (ref 0.5–1.3)
EGFR: 56 ML/MIN/1.73M2 — LOW
EGFR: 63 ML/MIN/1.73M2
ESTIMATED AVERAGE GLUCOSE: 157 MG/DL
GLUCOSE BLDC GLUCOMTR-MCNC: 178 MG/DL — HIGH (ref 70–99)
GLUCOSE BLDC GLUCOMTR-MCNC: 192 MG/DL — HIGH (ref 70–99)
GLUCOSE BLDC GLUCOMTR-MCNC: 208 MG/DL — HIGH (ref 70–99)
GLUCOSE BLDC GLUCOMTR-MCNC: 233 MG/DL — HIGH (ref 70–99)
GLUCOSE SERPL-MCNC: 147 MG/DL — HIGH (ref 70–99)
GLUCOSE SERPL-MCNC: 148 MG/DL
HBA1C MFR BLD HPLC: 7.1 %
HCT VFR BLD CALC: 31.2 % — LOW (ref 39–50)
HGB BLD-MCNC: 10.2 G/DL — LOW (ref 13–17)
MAGNESIUM SERPL-MCNC: 2.1 MG/DL — SIGNIFICANT CHANGE UP (ref 1.6–2.6)
MCHC RBC-ENTMCNC: 29.7 PG — SIGNIFICANT CHANGE UP (ref 27–34)
MCHC RBC-ENTMCNC: 32.7 GM/DL — SIGNIFICANT CHANGE UP (ref 32–36)
MCV RBC AUTO: 90.7 FL — SIGNIFICANT CHANGE UP (ref 80–100)
PHOSPHATE SERPL-MCNC: 4.2 MG/DL — SIGNIFICANT CHANGE UP (ref 2.4–4.7)
PLATELET # BLD AUTO: 244 K/UL — SIGNIFICANT CHANGE UP (ref 150–400)
POTASSIUM SERPL-MCNC: 4 MMOL/L — SIGNIFICANT CHANGE UP (ref 3.5–5.3)
POTASSIUM SERPL-SCNC: 4 MMOL/L — SIGNIFICANT CHANGE UP (ref 3.5–5.3)
POTASSIUM SERPL-SCNC: 5.5 MMOL/L
PROT SERPL-MCNC: 7.3 G/DL
RBC # BLD: 3.44 M/UL — LOW (ref 4.2–5.8)
RBC # FLD: 14.9 % — HIGH (ref 10.3–14.5)
SODIUM SERPL-SCNC: 138 MMOL/L — SIGNIFICANT CHANGE UP (ref 135–145)
SODIUM SERPL-SCNC: 142 MMOL/L
WBC # BLD: 8.11 K/UL — SIGNIFICANT CHANGE UP (ref 3.8–10.5)
WBC # FLD AUTO: 8.11 K/UL — SIGNIFICANT CHANGE UP (ref 3.8–10.5)

## 2024-04-15 PROCEDURE — 93970 EXTREMITY STUDY: CPT | Mod: 26

## 2024-04-15 PROCEDURE — 99232 SBSQ HOSP IP/OBS MODERATE 35: CPT

## 2024-04-15 PROCEDURE — 99233 SBSQ HOSP IP/OBS HIGH 50: CPT

## 2024-04-15 RX ORDER — QUETIAPINE FUMARATE 200 MG/1
12.5 TABLET, FILM COATED ORAL AT BEDTIME
Refills: 0 | Status: DISCONTINUED | OUTPATIENT
Start: 2024-04-15 | End: 2024-04-20

## 2024-04-15 RX ADMIN — SODIUM CHLORIDE 3 MILLILITER(S): 9 INJECTION INTRAMUSCULAR; INTRAVENOUS; SUBCUTANEOUS at 14:12

## 2024-04-15 RX ADMIN — Medication 100 MILLIGRAM(S): at 13:00

## 2024-04-15 RX ADMIN — Medication 40 MILLIGRAM(S): at 05:39

## 2024-04-15 RX ADMIN — SIMVASTATIN 40 MILLIGRAM(S): 20 TABLET, FILM COATED ORAL at 21:54

## 2024-04-15 RX ADMIN — Medication 4: at 16:40

## 2024-04-15 RX ADMIN — Medication 2: at 21:55

## 2024-04-15 RX ADMIN — Medication 2: at 13:09

## 2024-04-15 RX ADMIN — Medication 3 MILLILITER(S): at 21:04

## 2024-04-15 RX ADMIN — AMLODIPINE BESYLATE 5 MILLIGRAM(S): 2.5 TABLET ORAL at 05:39

## 2024-04-15 RX ADMIN — HEPARIN SODIUM 5000 UNIT(S): 5000 INJECTION INTRAVENOUS; SUBCUTANEOUS at 14:32

## 2024-04-15 RX ADMIN — LISINOPRIL 20 MILLIGRAM(S): 2.5 TABLET ORAL at 05:39

## 2024-04-15 RX ADMIN — QUETIAPINE FUMARATE 12.5 MILLIGRAM(S): 200 TABLET, FILM COATED ORAL at 21:54

## 2024-04-15 RX ADMIN — HEPARIN SODIUM 5000 UNIT(S): 5000 INJECTION INTRAVENOUS; SUBCUTANEOUS at 05:39

## 2024-04-15 RX ADMIN — Medication 4: at 08:17

## 2024-04-15 RX ADMIN — Medication 3 MILLILITER(S): at 10:16

## 2024-04-15 RX ADMIN — HEPARIN SODIUM 5000 UNIT(S): 5000 INJECTION INTRAVENOUS; SUBCUTANEOUS at 21:54

## 2024-04-15 NOTE — DIETITIAN INITIAL EVALUATION ADULT - PERTINENT MEDS FT
MEDICATIONS  (STANDING):dextrose 5%. 1000 milliLiter(s) (100 mL/Hr) IV Continuous <Continuous>  dextrose 5%. 1000 milliLiter(s) (50 mL/Hr) IV Continuous <Continuous>  furosemide   Injectable 40 milliGRAM(s) IV Push daily  glucagon  Injectable 1 milliGRAM(s) IntraMuscular once  insulin lispro (ADMELOG) corrective regimen sliding scale   SubCutaneous Before meals and at bedtime  sodium chloride 0.9% lock flush 3 milliLiter(s) IV Push every 8 hours    MEDICATIONS  (PRN):  ondansetron Injectable 4 milliGRAM(s) IV Push every 8 hours PRN Nausea and/or Vomiting

## 2024-04-15 NOTE — DIETITIAN INITIAL EVALUATION ADULT - ORAL INTAKE PTA/DIET HISTORY
Nutrition consult completed. Spoke with pt and family today at bedside. Denies N/V/C. Had one episode of diarrhea this morning. No issues chewing, but reported occasional issues swallowing food. Pt with good po intake today, with lunch tray present at time of interview. Consumed 100% of breakfast and lunch. Good po intake prior to admission. Follows low sugar and low sodium diet at home. Does not take vit/min supplements at home. Reported NKFA. #.No recent weight loss/weight gain as per pt and family. Declined diet education.

## 2024-04-15 NOTE — PROGRESS NOTE ADULT - ASSESSMENT
84 y/o male with new hypoxia, orthopnea, LE edema, ANUJ, Hx of HTN, HLD, DM-2, Gout    New onset CHF, unknown subtype:  -Patient constellation of symptoms concerning for new onset CHF  -No fever, no leucocytosis, no pleurisy or sputum  -IV Diuresis w/ Lasix  - TTE reviewed   - US duplex LE to r/o DVT  -Cont. ACE, hold BB pending 2D echo  -Daily weights, daily BMP/lytes  -Cardiology recs pending  -OOB, ambulate, fall risk  -DVT-P w/ vc boots sub Q heparin    ANUJ:  -Exam c/w severe AS murmur   -Check 2D echo  -Avoid hypotension     HTN:  -Cont. O/P regimen  -DASH diet    HLD:  -Statin    DM-2:  -Hold oral agents  -ADA diet  -Accu checks AC/HS w/ coverage    Gout:  -Cont. Allopurinol     Discussed with Patient, and family at bedside who agrees with above plan of care

## 2024-04-15 NOTE — PROGRESS NOTE ADULT - SUBJECTIVE AND OBJECTIVE BOX
[facility]                                                         CARDIOLOGY AT [place] [address]                                                         Telephone: [phone]. Fax:[phone]                                                                             PROGRESS NOTE    Reason for follow up: Volume overload  Overall Plan: AMARIS and CT calcium score    Review of symptoms:   Cardiac:  No chest pain. No dyspnea. No palpitations.  Respiratory: no cough. No dyspnea  Gastrointestinal: No diarrhea. No abdominal pain. No bleeding.   Neuro: No focal neuro complaints.    Vitals:  T(C): 36.7 (04-15-24 @ 08:18), Max: 36.7 (04-15-24 @ 08:18)  HR: 94 (04-15-24 @ 08:18) (92 - 106)  BP: 134/79 (04-15-24 @ 08:18) (119/72 - 153/81)  RR: 18 (04-15-24 @ 08:18) (18 - 18)  SpO2: 98% (04-15-24 @ 10:17) (93% - 99%)  Wt(kg): --  I&O's Summary    14 Apr 2024 07:01  -  15 Apr 2024 07:00  --------------------------------------------------------  IN: 600 mL / OUT: 550 mL / NET: 50 mL    15 Apr 2024 07:01  -  15 Apr 2024 13:58  --------------------------------------------------------  IN: 300 mL / OUT: 0 mL / NET: 300 mL      Weight (kg): 72.6 (04-13 @ 17:33)    PHYSICAL EXAM:  Appearance: Comfortable. No acute distress  HEENT:  Atraumatic. Normocephalic.  Normal oral mucosa  Neurologic: A & O x 3, no gross focal deficits.  Cardiovascular: RRR S1 S2, No murmur, no rubs/gallops. No JVD  Respiratory: Lungs clear to auscultation, unlabored   Gastrointestinal:  Soft, Non-tender, + BS  Lower Extremities: 2+ Peripheral Pulses, No clubbing, cyanosis, or edema  Psychiatry: Patient is calm. No agitation.   Skin: warm and dry.    CURRENT CARDIAC MEDICATIONS:  amLODIPine   Tablet 5 milliGRAM(s) Oral daily  furosemide   Injectable 40 milliGRAM(s) IV Push daily  lisinopril 20 milliGRAM(s) Oral daily      CURRENT OTHER MEDICATIONS:  albuterol/ipratropium for Nebulization 3 milliLiter(s) Nebulizer every 6 hours  acetaminophen     Tablet .. 650 milliGRAM(s) Oral every 6 hours PRN Temp greater or equal to 38C (100.4F), Mild Pain (1 - 3)  melatonin 3 milliGRAM(s) Oral at bedtime PRN Insomnia  ondansetron Injectable 4 milliGRAM(s) IV Push every 8 hours PRN Nausea and/or Vomiting  aluminum hydroxide/magnesium hydroxide/simethicone Suspension 30 milliLiter(s) Oral every 4 hours PRN Dyspepsia  allopurinol 100 milliGRAM(s) Oral daily  dextrose 50% Injectable 25 Gram(s) IV Push once, Stop order after: 1 Doses  dextrose 50% Injectable 12.5 Gram(s) IV Push once, Stop order after: 1 Doses  dextrose Oral Gel 15 Gram(s) Oral once, Stop order after: 1 Doses PRN Blood Glucose LESS THAN 70 milliGRAM(s)/deciliter  glucagon  Injectable 1 milliGRAM(s) IntraMuscular once, Stop order after: 1 Doses  insulin lispro (ADMELOG) corrective regimen sliding scale   SubCutaneous Before meals and at bedtime  simvastatin 40 milliGRAM(s) Oral at bedtime  dextrose 10% Bolus 125 milliLiter(s) IV Bolus once, Stop order after: 1 Doses  dextrose 5%. 1000 milliLiter(s) (100 mL/Hr) IV Continuous <Continuous>  dextrose 5%. 1000 milliLiter(s) (50 mL/Hr) IV Continuous <Continuous>  heparin   Injectable 5000 Unit(s) SubCutaneous every 8 hours  sodium chloride 0.9% lock flush 3 milliLiter(s) IV Push every 8 hours      LABS:	 	                        10.2   8.11  )-----------( 244      ( 15 Apr 2024 04:42 )             31.2     04-15    138  |  102  |  30.0<H>  ----------------------------<  147<H>  4.0   |  22.0  |  1.26    Ca    8.2<L>      15 Apr 2024 04:42  Phos  4.2     04-15  Mg     2.1     04-15    TPro  6.4<L>  /  Alb  3.7  /  TBili  0.2<L>  /  DBili  x   /  AST  29  /  ALT  16  /  AlkPhos  73  04-13      Lipid Profile:   HgA1c:   TSH:

## 2024-04-15 NOTE — DIETITIAN INITIAL EVALUATION ADULT - ADD RECOMMEND
1) Consider SLP eval   2) Monitor weights daily for trend/accuracy   3) Continue diet as tolerated.   4) Rx MVI daily   5) monitor labs

## 2024-04-15 NOTE — DIETITIAN INITIAL EVALUATION ADULT - PERTINENT LABORATORY DATA
04-15    138  |  102  |  30.0<H>  ----------------------------<  147<H>  4.0   |  22.0  |  1.26    Ca    8.2<L>      15 Apr 2024 04:42  Phos  4.2     04-15  Mg     2.1     04-15    TPro  6.4<L>  /  Alb  3.7  /  TBili  0.2<L>  /  DBili  x   /  AST  29  /  ALT  16  /  AlkPhos  73  04-13  POCT Blood Glucose.: 192 mg/dL (04-15-24 @ 13:07)  A1C with Estimated Average Glucose Result: 7.2 % (04-14-24 @ 03:05)

## 2024-04-15 NOTE — DIETITIAN INITIAL EVALUATION ADULT - NS FNS DIET ORDER
Diet, DASH/TLC:   Sodium & Cholesterol Restricted  Consistent Carbohydrate {No Snacks} (CSTCHO) (04-13-24 @ 22:56)

## 2024-04-15 NOTE — PROGRESS NOTE ADULT - ASSESSMENT
A/P: Patient is an 84 y/o M with a PMHx of HTN, HLD, DMII, and gout who presented to [facility] from and urgent care due to worsening shortness of breath and hypoxia. Patient states that for the last few weeks he has been experiencing worsening dyspnea on exertion, cough, and orthopnea. Patient states that he went to his PMD who gave him flonase and zyrtec, but his symptoms continued to worsen. Patient states yesterday he went to urgent care and was noted to be hypoxic, so he was sent to [facility] for further management. Patient noted to have worsening leg swelling as well, hypoxia, and CXR with pulmonary vascular congestion. Patient denies any cardiac workup in the past. Patient denies any fevers, chills, syncope, near syncope, chest pain, abdominal pain, N/V/D, headache, or dizziness.   pBNP 616

## 2024-04-15 NOTE — PROGRESS NOTE ADULT - SUBJECTIVE AND OBJECTIVE BOX
Patient is a 83y old  Male who presents with a chief complaint of New onset CHF  Possible PNA (14 Apr 2024 11:24)    INTERVAL HPI/OVERNIGHT EVENTS: No acute events overnight. HD stable.     MEDICATIONS  (STANDING):  albuterol/ipratropium for Nebulization 3 milliLiter(s) Nebulizer every 6 hours  allopurinol 100 milliGRAM(s) Oral daily  amLODIPine   Tablet 5 milliGRAM(s) Oral daily  dextrose 10% Bolus 125 milliLiter(s) IV Bolus once  dextrose 5%. 1000 milliLiter(s) (50 mL/Hr) IV Continuous <Continuous>  dextrose 5%. 1000 milliLiter(s) (100 mL/Hr) IV Continuous <Continuous>  dextrose 50% Injectable 12.5 Gram(s) IV Push once  dextrose 50% Injectable 25 Gram(s) IV Push once  furosemide   Injectable 40 milliGRAM(s) IV Push daily  glucagon  Injectable 1 milliGRAM(s) IntraMuscular once  heparin   Injectable 5000 Unit(s) SubCutaneous every 8 hours  insulin lispro (ADMELOG) corrective regimen sliding scale   SubCutaneous Before meals and at bedtime  lisinopril 20 milliGRAM(s) Oral daily  simvastatin 40 milliGRAM(s) Oral at bedtime  sodium chloride 0.9% lock flush 3 milliLiter(s) IV Push every 8 hours    MEDICATIONS  (PRN):  acetaminophen     Tablet .. 650 milliGRAM(s) Oral every 6 hours PRN Temp greater or equal to 38C (100.4F), Mild Pain (1 - 3)  aluminum hydroxide/magnesium hydroxide/simethicone Suspension 30 milliLiter(s) Oral every 4 hours PRN Dyspepsia  dextrose Oral Gel 15 Gram(s) Oral once PRN Blood Glucose LESS THAN 70 milliGRAM(s)/deciliter  melatonin 3 milliGRAM(s) Oral at bedtime PRN Insomnia  ondansetron Injectable 4 milliGRAM(s) IV Push every 8 hours PRN Nausea and/or Vomiting      Allergies    No Known Allergies    Intolerances        REVIEW OF SYSTEMS: all negative with exception of above    Vital Signs Last 24 Hrs  T(C): 36.7 (15 Apr 2024 08:18), Max: 36.7 (15 Apr 2024 08:18)  T(F): 98 (15 Apr 2024 08:18), Max: 98 (15 Apr 2024 08:18)  HR: 94 (15 Apr 2024 08:18) (92 - 106)  BP: 134/79 (15 Apr 2024 08:18) (119/72 - 153/81)  BP(mean): --  RR: 18 (15 Apr 2024 08:18) (18 - 18)  SpO2: 98% (15 Apr 2024 10:17) (93% - 99%)    Parameters below as of 15 Apr 2024 10:17  Patient On (Oxygen Delivery Method): nasal cannula, 2 lpm        PHYSICAL EXAM:  CONSTITUTIONAL: NAD,  well-groomed  HEENMT: NC/AT, PERRLA, EOMI, Moist oral mucosa, no pharyngeal injection or exudates; normal dentition  RESPIRATORY: BLAE, bibasilar rhonci  CARDIOVASCULAR: S1/S2+, RRR, +Systolic Murmur present  ABDOMEN: Soft, NT/ND, BS+, No guarding  MSK:  Moves limbs with purpose and direction; no clubbing or cyanosis of digits; no joint swelling or tenderness to palpation  PSYCH: normal mood and affect  NEUROLOGY: AAOx4, CN 2-12 are intact and symmetric; no gross sensory deficits;   SKIN: Warm, Dry, No rashes; no palpable lesions      LABS:                        10.2   8.11  )-----------( 244      ( 15 Apr 2024 04:42 )             31.2     04-15    138  |  102  |  30.0<H>  ----------------------------<  147<H>  4.0   |  22.0  |  1.26    Ca    8.2<L>      15 Apr 2024 04:42  Phos  4.2     04-15  Mg     2.1     04-15    TPro  6.4<L>  /  Alb  3.7  /  TBili  0.2<L>  /  DBili  x   /  AST  29  /  ALT  16  /  AlkPhos  73  04-13      Urinalysis Basic - ( 15 Apr 2024 04:42 )    Color: x / Appearance: x / SG: x / pH: x  Gluc: 147 mg/dL / Ketone: x  / Bili: x / Urobili: x   Blood: x / Protein: x / Nitrite: x   Leuk Esterase: x / RBC: x / WBC x   Sq Epi: x / Non Sq Epi: x / Bacteria: x      CAPILLARY BLOOD GLUCOSE      POCT Blood Glucose.: 192 mg/dL (15 Apr 2024 13:07)  POCT Blood Glucose.: 208 mg/dL (15 Apr 2024 08:05)  POCT Blood Glucose.: 209 mg/dL (14 Apr 2024 21:36)  POCT Blood Glucose.: 223 mg/dL (14 Apr 2024 17:04)      RADIOLOGY & ADDITIONAL TESTS:    Imaging Personally Reviewed:  [ ] YES  [ ] NO  < from: TTE W or WO Ultrasound Enhancing Agent (04.14.24 @ 14:18) >  CONCLUSIONS:      1. Left ventricular systolic function is normal with an ejection fraction visually estimated at 55 to 60 %.   2. There is increased LV mass and eccentric hypertrophy.   3. Normal right ventricular cavity size, with normal wall thickness, and normal systolic function.   4. Low flow, low gradient aortic stenosis with preserved EF. Left ventricular stroke volume is 47.6 ml ;left ventricular stroke volume index is 24.6 ml/m².   5. No prior echocardiogram is available for comparison.      < end of copied text >        Consultant(s) Notes Reviewed:  [ ] YES  [ ] NO    Care Discussed with Consultants/Other Providers [ ] YES  [ ] NO

## 2024-04-15 NOTE — DIETITIAN INITIAL EVALUATION ADULT - OTHER INFO
82 y/o male with new hypoxia, orthopnea, LE edema, ANUJ, Hx of HTN, HLD, DM-2, Gout. Possible PNA. New onset CHF.

## 2024-04-16 LAB
ALBUMIN SERPL ELPH-MCNC: 3.5 G/DL — SIGNIFICANT CHANGE UP (ref 3.3–5.2)
ALP SERPL-CCNC: 61 U/L — SIGNIFICANT CHANGE UP (ref 40–120)
ALT FLD-CCNC: 14 U/L — SIGNIFICANT CHANGE UP
ANION GAP SERPL CALC-SCNC: 16 MMOL/L — SIGNIFICANT CHANGE UP (ref 5–17)
AST SERPL-CCNC: 17 U/L — SIGNIFICANT CHANGE UP
BILIRUB SERPL-MCNC: 0.3 MG/DL — LOW (ref 0.4–2)
BUN SERPL-MCNC: 48 MG/DL — HIGH (ref 8–20)
CALCIUM SERPL-MCNC: 8.4 MG/DL — SIGNIFICANT CHANGE UP (ref 8.4–10.5)
CHLORIDE SERPL-SCNC: 104 MMOL/L — SIGNIFICANT CHANGE UP (ref 96–108)
CO2 SERPL-SCNC: 21 MMOL/L — LOW (ref 22–29)
CREAT SERPL-MCNC: 1.71 MG/DL — HIGH (ref 0.5–1.3)
EGFR: 39 ML/MIN/1.73M2 — LOW
GLUCOSE BLDC GLUCOMTR-MCNC: 158 MG/DL — HIGH (ref 70–99)
GLUCOSE BLDC GLUCOMTR-MCNC: 205 MG/DL — HIGH (ref 70–99)
GLUCOSE BLDC GLUCOMTR-MCNC: 214 MG/DL — HIGH (ref 70–99)
GLUCOSE BLDC GLUCOMTR-MCNC: 298 MG/DL — HIGH (ref 70–99)
GLUCOSE SERPL-MCNC: 180 MG/DL — HIGH (ref 70–99)
HCT VFR BLD CALC: 31 % — LOW (ref 39–50)
HGB BLD-MCNC: 10 G/DL — LOW (ref 13–17)
MCHC RBC-ENTMCNC: 29.1 PG — SIGNIFICANT CHANGE UP (ref 27–34)
MCHC RBC-ENTMCNC: 32.3 GM/DL — SIGNIFICANT CHANGE UP (ref 32–36)
MCV RBC AUTO: 90.1 FL — SIGNIFICANT CHANGE UP (ref 80–100)
PLATELET # BLD AUTO: 236 K/UL — SIGNIFICANT CHANGE UP (ref 150–400)
POTASSIUM SERPL-MCNC: 3.9 MMOL/L — SIGNIFICANT CHANGE UP (ref 3.5–5.3)
POTASSIUM SERPL-SCNC: 3.9 MMOL/L — SIGNIFICANT CHANGE UP (ref 3.5–5.3)
PROT SERPL-MCNC: 5.9 G/DL — LOW (ref 6.6–8.7)
RBC # BLD: 3.44 M/UL — LOW (ref 4.2–5.8)
RBC # FLD: 14.8 % — HIGH (ref 10.3–14.5)
SODIUM SERPL-SCNC: 140 MMOL/L — SIGNIFICANT CHANGE UP (ref 135–145)
WBC # BLD: 6.72 K/UL — SIGNIFICANT CHANGE UP (ref 3.8–10.5)
WBC # FLD AUTO: 6.72 K/UL — SIGNIFICANT CHANGE UP (ref 3.8–10.5)

## 2024-04-16 PROCEDURE — 99232 SBSQ HOSP IP/OBS MODERATE 35: CPT

## 2024-04-16 PROCEDURE — 75571 CT HRT W/O DYE W/CA TEST: CPT | Mod: 26

## 2024-04-16 PROCEDURE — 99233 SBSQ HOSP IP/OBS HIGH 50: CPT

## 2024-04-16 RX ORDER — SODIUM BICARBONATE 1 MEQ/ML
650 SYRINGE (ML) INTRAVENOUS EVERY 8 HOURS
Refills: 0 | Status: DISCONTINUED | OUTPATIENT
Start: 2024-04-16 | End: 2024-04-20

## 2024-04-16 RX ADMIN — SIMVASTATIN 40 MILLIGRAM(S): 20 TABLET, FILM COATED ORAL at 22:26

## 2024-04-16 RX ADMIN — SODIUM CHLORIDE 3 MILLILITER(S): 9 INJECTION INTRAMUSCULAR; INTRAVENOUS; SUBCUTANEOUS at 23:34

## 2024-04-16 RX ADMIN — Medication 3 MILLILITER(S): at 02:36

## 2024-04-16 RX ADMIN — AMLODIPINE BESYLATE 5 MILLIGRAM(S): 2.5 TABLET ORAL at 06:28

## 2024-04-16 RX ADMIN — Medication 3 MILLILITER(S): at 15:54

## 2024-04-16 RX ADMIN — HEPARIN SODIUM 5000 UNIT(S): 5000 INJECTION INTRAVENOUS; SUBCUTANEOUS at 22:23

## 2024-04-16 RX ADMIN — Medication 3 MILLILITER(S): at 09:29

## 2024-04-16 RX ADMIN — Medication 40 MILLIGRAM(S): at 06:28

## 2024-04-16 RX ADMIN — Medication 650 MILLIGRAM(S): at 14:23

## 2024-04-16 RX ADMIN — HEPARIN SODIUM 5000 UNIT(S): 5000 INJECTION INTRAVENOUS; SUBCUTANEOUS at 06:28

## 2024-04-16 RX ADMIN — Medication 100 MILLIGRAM(S): at 14:23

## 2024-04-16 RX ADMIN — QUETIAPINE FUMARATE 12.5 MILLIGRAM(S): 200 TABLET, FILM COATED ORAL at 22:23

## 2024-04-16 RX ADMIN — SODIUM CHLORIDE 3 MILLILITER(S): 9 INJECTION INTRAMUSCULAR; INTRAVENOUS; SUBCUTANEOUS at 06:48

## 2024-04-16 RX ADMIN — Medication 650 MILLIGRAM(S): at 22:23

## 2024-04-16 RX ADMIN — SODIUM CHLORIDE 3 MILLILITER(S): 9 INJECTION INTRAMUSCULAR; INTRAVENOUS; SUBCUTANEOUS at 14:00

## 2024-04-16 RX ADMIN — LISINOPRIL 20 MILLIGRAM(S): 2.5 TABLET ORAL at 06:28

## 2024-04-16 RX ADMIN — SODIUM CHLORIDE 3 MILLILITER(S): 9 INJECTION INTRAMUSCULAR; INTRAVENOUS; SUBCUTANEOUS at 02:39

## 2024-04-16 RX ADMIN — Medication 4: at 22:29

## 2024-04-16 RX ADMIN — Medication 4: at 07:53

## 2024-04-16 RX ADMIN — Medication 3 MILLIGRAM(S): at 22:23

## 2024-04-16 RX ADMIN — Medication 6: at 17:25

## 2024-04-16 RX ADMIN — HEPARIN SODIUM 5000 UNIT(S): 5000 INJECTION INTRAVENOUS; SUBCUTANEOUS at 14:31

## 2024-04-16 RX ADMIN — Medication 3 MILLILITER(S): at 20:39

## 2024-04-16 NOTE — SWALLOW BEDSIDE ASSESSMENT ADULT - SWALLOW EVAL: RECOMMENDED DIET
soft/bite-sized solids (extra moistened with gravies & sauces, etc.), & thin fluids soft/bite-sized solids (extra moistened with gravies & sauces, etc., AVOID dry textures), & thin fluids

## 2024-04-16 NOTE — SWALLOW BEDSIDE ASSESSMENT ADULT - SLP PERTINENT HISTORY OF CURRENT PROBLEM
Upon ? pt endorsed sensation of solids slow to go down at times, specifically in mid to upper esophageal area. Pt reported episode with regurgitation of salmon this admission (reported it was chunky & dry)

## 2024-04-16 NOTE — PROGRESS NOTE ADULT - SUBJECTIVE AND OBJECTIVE BOX
Department of Cardiology                                                                  [facility]/[place] [address] [facility]                                                            Telephone: [phone]. Fax:[phone]                                                                                     Pre-AMARIS Note        Narrative:  84 y/o M with a PMHx of HTN, HLD, DMII, and gout who presented to [facility] from and urgent care due to worsening shortness of breath and hypoxia. Patient states that for the last few weeks he has been experiencing worsening dyspnea on exertion, cough, and orthopnea. Patient states that he went to his PMD who gave him flonase and zyrtec, but his symptoms continued to worsen. Patient states yesterday he went to urgent care and was noted to be hypoxic, so he was sent to [facility] for further management. Patient noted to have worsening leg swelling as well, hypoxia, and CXR with pulmonary vascular congestion. Patient denies any cardiac workup in the past.      ASA and Mallampati: Per Anesthesia    	  MEDICATIONS:  amLODIPine   Tablet 5 milliGRAM(s) Oral daily  lisinopril 20 milliGRAM(s) Oral daily      albuterol/ipratropium for Nebulization 3 milliLiter(s) Nebulizer every 6 hours    acetaminophen     Tablet .. 650 milliGRAM(s) Oral every 6 hours PRN  melatonin 3 milliGRAM(s) Oral at bedtime PRN  ondansetron Injectable 4 milliGRAM(s) IV Push every 8 hours PRN  QUEtiapine 12.5 milliGRAM(s) Oral at bedtime    aluminum hydroxide/magnesium hydroxide/simethicone Suspension 30 milliLiter(s) Oral every 4 hours PRN    allopurinol 100 milliGRAM(s) Oral daily  dextrose 50% Injectable 12.5 Gram(s) IV Push once  dextrose 50% Injectable 25 Gram(s) IV Push once  dextrose Oral Gel 15 Gram(s) Oral once PRN  glucagon  Injectable 1 milliGRAM(s) IntraMuscular once  insulin lispro (ADMELOG) corrective regimen sliding scale   SubCutaneous Before meals and at bedtime  simvastatin 40 milliGRAM(s) Oral at bedtime    dextrose 10% Bolus 125 milliLiter(s) IV Bolus once  dextrose 5%. 1000 milliLiter(s) IV Continuous <Continuous>  dextrose 5%. 1000 milliLiter(s) IV Continuous <Continuous>  heparin   Injectable 5000 Unit(s) SubCutaneous every 8 hours  sodium chloride 0.9% lock flush 3 milliLiter(s) IV Push every 8 hours        PHYSICAL EXAM:    T(C): 36.7 (24 @ 08:33), Max: 37 (04-15-24 @ 17:19)  HR: 96 (24 @ 09:29) (91 - 109)  BP: 145/77 (24 @ 08:33) (104/65 - 145/77)  RR: 18 (24 @ 08:33) (15 - 18)  SpO2: 94% (24 @ 09:29) (92% - 98%)  Wt(kg): --    I&O's Summary    15 Apr 2024 07:01  -  2024 07:00  --------------------------------------------------------  IN: 1280 mL / OUT: 830 mL / NET: 450 mL        Daily     Daily Weight in k.5 (2024 04:53)    Constitutional: A & O x 3  HEENT:   Normal oral mucosa, PERRL, EOMI	  Cardiovascular: Normal S1 S2, No JVD, No murmurs, No edema  Respiratory: Lungs clear to auscultation	  Gastrointestinal:  Soft, Non-tender, + BS	  Skin: No rashes, No ecchymoses, No cyanosis  Neurologic: Non-focal  Extremities: Normal range of motion, No clubbing, cyanosis or edema  Vascular: Peripheral pulses palpable 2+ bilaterally    TELEMETRY: 	        LABS:	 	    CARDIAC MARKERS:                          10.0   6.72  )-----------( 236      ( 2024 04:30 )             31.0     04-16    140  |  104  |  48.0<H>  ----------------------------<  180<H>  3.9   |  21.0<L>  |  1.71<H>    Ca    8.4      2024 04:30  Phos  4.2     04-15  Mg     2.1     04-15    TPro  5.9<L>  /  Alb  3.5  /  TBili  0.3<L>  /  DBili  x   /  AST  17  /  ALT  14  /  AlkPhos  61  -16      ASSESSMENT: 84 y/o M with a PMHx of HTN, HLD, DMII, and gout who presented to [facility] from and urgent care due to worsening shortness of breath and hypoxia. Patient states that for the last few weeks he has been experiencing worsening dyspnea on exertion, cough, and orthopnea. Patient states that he went to his PMD who gave him flonase and zyrtec, but his symptoms continued to worsen. Patient states yesterday he went to urgent care and was noted to be hypoxic, so he was sent to [facility] for further management. Patient noted to have worsening leg swelling as well, hypoxia, and CXR with pulmonary vascular congestion. Patient denies any cardiac workup in the past. Now for AMARIS to assess AS severity      -AMARIS as ordered  -Labs and ECG reviewed  -Procedure discussed with patient; risks and benefits explained; questions answered  -Consent obtained by Echocardiographer and anesthesiologist                                                                         Department of Cardiology                                                                  [facility]/[place] [address] [address] [facility]                                                            Telephone: [phone]. Fax:[phone]                                                                                     Pre-AMARIS Note        Narrative:  84 y/o M with a PMHx of HTN, HLD, DMII, and gout who presented to [facility] from and urgent care due to worsening shortness of breath and hypoxia. Patient states that for the last few weeks he has been experiencing worsening dyspnea on exertion, cough, and orthopnea. Patient states that he went to his PMD who gave him flonase and zyrtec, but his symptoms continued to worsen. Patient states yesterday he went to urgent care and was noted to be hypoxic, so he was sent to [facility] for further management. Patient noted to have worsening leg swelling as well, hypoxia, and CXR with pulmonary vascular congestion. Patient denies any cardiac workup in the past.      ASA and Mallampati: Per Anesthesia    	  MEDICATIONS:  amLODIPine   Tablet 5 milliGRAM(s) Oral daily  lisinopril 20 milliGRAM(s) Oral daily      albuterol/ipratropium for Nebulization 3 milliLiter(s) Nebulizer every 6 hours    acetaminophen     Tablet .. 650 milliGRAM(s) Oral every 6 hours PRN  melatonin 3 milliGRAM(s) Oral at bedtime PRN  ondansetron Injectable 4 milliGRAM(s) IV Push every 8 hours PRN  QUEtiapine 12.5 milliGRAM(s) Oral at bedtime    aluminum hydroxide/magnesium hydroxide/simethicone Suspension 30 milliLiter(s) Oral every 4 hours PRN    allopurinol 100 milliGRAM(s) Oral daily  dextrose 50% Injectable 12.5 Gram(s) IV Push once  dextrose 50% Injectable 25 Gram(s) IV Push once  dextrose Oral Gel 15 Gram(s) Oral once PRN  glucagon  Injectable 1 milliGRAM(s) IntraMuscular once  insulin lispro (ADMELOG) corrective regimen sliding scale   SubCutaneous Before meals and at bedtime  simvastatin 40 milliGRAM(s) Oral at bedtime    dextrose 10% Bolus 125 milliLiter(s) IV Bolus once  dextrose 5%. 1000 milliLiter(s) IV Continuous <Continuous>  dextrose 5%. 1000 milliLiter(s) IV Continuous <Continuous>  heparin   Injectable 5000 Unit(s) SubCutaneous every 8 hours  sodium chloride 0.9% lock flush 3 milliLiter(s) IV Push every 8 hours        PHYSICAL EXAM:    T(C): 36.7 (24 @ 08:33), Max: 37 (04-15-24 @ 17:19)  HR: 96 (24 @ 09:29) (91 - 109)  BP: 145/77 (24 @ 08:33) (104/65 - 145/77)  RR: 18 (24 @ 08:33) (15 - 18)  SpO2: 94% (24 @ 09:29) (92% - 98%)  Wt(kg): --    I&O's Summary    15 Apr 2024 07:01  -  2024 07:00  --------------------------------------------------------  IN: 1280 mL / OUT: 830 mL / NET: 450 mL        Daily     Daily Weight in k.5 (2024 04:53)    Constitutional: A & O x 3  HEENT:   Normal oral mucosa, PERRL, EOMI	  Cardiovascular: Normal S1 S2, No JVD, No murmurs, No edema  Respiratory: Lungs clear to auscultation	  Gastrointestinal:  Soft, Non-tender, + BS	  Skin: No rashes, No ecchymoses, No cyanosis  Neurologic: Non-focal  Extremities: Normal range of motion, No clubbing, cyanosis or edema  Vascular: Peripheral pulses palpable 2+ bilaterally    TELEMETRY: 	        LABS:	 	    CARDIAC MARKERS:                          10.0   6.72  )-----------( 236      ( 2024 04:30 )             31.0     04-16    140  |  104  |  48.0<H>  ----------------------------<  180<H>  3.9   |  21.0<L>  |  1.71<H>    Ca    8.4      2024 04:30  Phos  4.2     04-15  Mg     2.1     04-15    TPro  5.9<L>  /  Alb  3.5  /  TBili  0.3<L>  /  DBili  x   /  AST  17  /  ALT  14  /  AlkPhos  61  -16      ASSESSMENT: 84 y/o M with a PMHx of HTN, HLD, DMII, and gout who presented to [facility] from and urgent care due to worsening shortness of breath and hypoxia. Patient states that for the last few weeks he has been experiencing worsening dyspnea on exertion, cough, and orthopnea. Patient states that he went to his PMD who gave him flonase and zyrtec, but his symptoms continued to worsen. Patient states yesterday he went to urgent care and was noted to be hypoxic, so he was sent to [facility] for further management. Patient noted to have worsening leg swelling as well, hypoxia, and CXR with pulmonary vascular congestion. Patient denies any cardiac workup in the past.      -AMARIS deferred due to difficulty swallowing  -swallow eval prior  -Will send patient back to bed

## 2024-04-16 NOTE — PROGRESS NOTE ADULT - ASSESSMENT
82 y/o male with new hypoxia, orthopnea, LE edema, ANUJ, Hx of HTN, HLD, DM-2, Gout    New onset CHF, unknown subtype:  -Patient constellation of symptoms concerning for new onset CHF  -No fever, no leucocytosis, no pleurisy or sputum  -IV Diuresis w/ Lasix  - TTE reviewed   - US duplex LE to r/o DVT  -Cont. ACE, hold BB pending 2D echo  -Daily weights, daily BMP/lytes  -Cardiology recs pending  -OOB, ambulate, fall risk  -DVT-P w/ vc boots sub Q heparin    ANUJ:  -Exam c/w severe AS murmur   -Check 2D echo  -Avoid hypotension     HTN:  -Cont. O/P regimen  -DASH diet    HLD:  -Statin    DM-2:  -Hold oral agents  -ADA diet  -Accu checks AC/HS w/ coverage    Gout:  -Cont. Allopurinol     Discussed with Patient, and family at bedside who agrees with above plan of care  84 y/o male with new hypoxia, orthopnea, LE edema, ANUJ, Hx of HTN, HLD, DM-2, Gout    New onset CHF, unknown subtype:  -Patient constellation of symptoms concerning for new onset CHF  -No fever, no leucocytosis, no pleurisy or sputum  - IV Diuresis w/ Lasix  - TTE reviewed  - US duplex LE to r/o DVT  -Cont. ACE, hold BB pending 2D echo  -Daily weights, daily BMP/lytes  -Cardiology recs- appreciated- AMARIS planned for 4/17, pending S/S eval for clearance  - CTS c/s placed  -OOB, ambulate, fall risk  -DVT-P w/ vc boots sub Q heparin    ANDREW  - likely pre-renal  - nephro c/s placed    ANUJ:  -Exam c/w severe AS murmur   -Check 2D echo  -Avoid hypotension     HTN:  -Cont. O/P regimen  -DASH diet    HLD:  -Statin    DM-2:  -Hold oral agents  -ADA diet  -Accu checks AC/HS w/ coverage    Gout:  -Cont. Allopurinol     Discussed with Patient, and family at bedside who agrees with above plan of care  82 y/o male with new hypoxia, orthopnea, LE edema, ANUJ, Hx of HTN, HLD, DM-2, Gout    New onset CHF, unknown subtype:  -Patient constellation of symptoms concerning for new onset CHF  -No fever, no leucocytosis, no pleurisy or sputum  - IV Diuresis w/ Lasix  - TTE reviewed  - US duplex LE negative for DVT  -Cont. ACE, hold BB pending 2D echo  -Daily weights, daily BMP/lytes  -Cardiology recs- appreciated- AMARIS planned for 4/17, pending S/S eval for clearance  - CTS c/s placed  -OOB, ambulate, fall risk  -DVT-P w/ vc boots sub Q heparin    ANDREW  - likely pre-renal  - nephro c/s placed    ANUJ:  -Exam c/w severe AS murmur   -Check 2D echo  -Avoid hypotension     HTN:  -Cont. O/P regimen  -DASH diet    HLD:  -Statin    DM-2:  -Hold oral agents  -ADA diet  -Accu checks AC/HS w/ coverage    Gout:  -Cont. Allopurinol     Discussed with Patient, and family at bedside who agrees with above plan of care

## 2024-04-16 NOTE — SWALLOW BEDSIDE ASSESSMENT ADULT - ASR SWALLOW DENTITION
[de-identified] : 55-year-old male presents to the neurosurgery office today alongside his caregiver, Darlin, from [facility] as a referral from PCP Dr. Aguayo for cervical spinal stenosis incidentally found during a workup for lymphadenopathy of the neck. \par \par PMHx: intellectual disability, hypothyroidism, HTN, DM, HLD, obesity, TITO\par \par CT of the neck originally obtained October of 2022 by ENT Dr. De La Rosa; patient presented at that time for lymph node of the neck and incidentally the CT showed stenosis at C4-C5 and C5-C6 secondary to spondylosis for which the patient's primary doctor then referred him to our office today\par \par Patient is a poor historian due to limitations of cognitive abilities.  When questioned, he responds "yes" to almost all questions but appears happy, comfortable and in no pain.  Does not grimace upon palpation to the neck when examined physically but points to the lower neck area if asked "where it hurts." + Hand grasp equal power bilaterally.  He was able to hold objects given in the exam room without dropping them and moved them from hand to hand without difficulty.  The caregiver present today endorses that the patient is able to feed himself without difficulty.  No myelopathic gait, ambulated steadily in the hallway without any assistive devices. \par \par ILYA, RN at facility, was called at the end of the visit and informed of plan delineated today. MRI noncontrast of the cervical spine ordered after which the patient will either return to the office or RN will schedule a telehealth visit to review results.  Both the caregiver and the RN were educated that if the patient suddenly begins to drop objects with his hands or exhibits signs of one-sided weakness he should proceed to the nearest ER. No acute physical concerns noted today upon examination. I also educated the RN that he should schedule a consultation with a GI specialist for the patient's complaints of stomach discomfort, he verbalized understanding. \par \par Physical Exam:\par Constitutional: Well appearing, no distress\par HEENT: Normocephalic Atraumatic\par Psychiatric: Awake, alert, normal affect. follows most commands but limited due to intellectual disability\par Language: Speech is clear, fluent. No dysarthria. \par Pulmonary: No respiratory distress\par \par Neurologic:\par CN II-XII grossly intact; EOMI with no nystagmus. No facial asymmetry bilaterally, full eye closure strength bilaterally. Hearing grossly normal. Smile symmetrical. *Patient did not understand the commands of turning head side to side or shrugging shoulders. \par Palpation: No pain to palpation of neck\par Strength: Full strength in all major muscle groups\par Motor: Normal muscle tone. *Patient did not understand pronator drift directions\par Reflexes: DTR of biceps, knee and ankle normal \par No Clonus\par No Portillo's\par \par ROM\par \par Gait: No myelopathic gait; Ambulated steadily without any assistive devices\par  
incomplete

## 2024-04-16 NOTE — SWALLOW BEDSIDE ASSESSMENT ADULT - SWALLOW EVAL: DIAGNOSIS
Oral & pharyngeal stage of swallow clinically judged to be WFL for assessed PO trials with NO overt s/s aspiration noted post intake.

## 2024-04-16 NOTE — PROGRESS NOTE ADULT - SUBJECTIVE AND OBJECTIVE BOX
[facility]                                                         CARDIOLOGY AT [facility]                                                                  [address], [place] [address]                                                         Telephone: [phone]. Fax:[phone]                                                                             PROGRESS NOTE    Reason for follow up: severe AS  Update: plan for AMARIS today for further evaluation       Review of symptoms:   Cardiac:  No chest pain. No dyspnea. No palpitations.  Respiratory: no cough. No dyspnea  Gastrointestinal: No diarrhea. No abdominal pain. No bleeding.   Neuro: No focal neuro complaints.    Vitals:  T(C): 36.7 (04-16-24 @ 08:33), Max: 37 (04-15-24 @ 17:19)  HR: 96 (04-16-24 @ 09:29) (91 - 109)  BP: 145/77 (04-16-24 @ 08:33) (104/65 - 145/77)  RR: 18 (04-16-24 @ 08:33) (15 - 18)  SpO2: 94% (04-16-24 @ 09:29) (92% - 98%)  Wt(kg): --  I&O's Summary    15 Apr 2024 07:01  -  16 Apr 2024 07:00  --------------------------------------------------------  IN: 1280 mL / OUT: 830 mL / NET: 450 mL      Weight (kg): 72.6 (04-13 @ 17:33)    PHYSICAL EXAM:  Appearance: Comfortable. No acute distress  HEENT:  Atraumatic. Normocephalic.  Normal oral mucosa  Neurologic: A & O x 3, no gross focal deficits.  Cardiovascular: RRR S1 S2, No murmur, no rubs/gallops. No JVD  Respiratory: Lungs clear to auscultation, unlabored   Gastrointestinal:  Soft, Non-tender, + BS  Lower Extremities: 2+ Peripheral Pulses, No clubbing, cyanosis, or edema  Psychiatry: Patient is calm. No agitation.   Skin: warm and dry.    CURRENT CARDIAC MEDICATIONS:  amLODIPine   Tablet 5 milliGRAM(s) Oral daily  lisinopril 20 milliGRAM(s) Oral daily      CURRENT OTHER MEDICATIONS:  albuterol/ipratropium for Nebulization 3 milliLiter(s) Nebulizer every 6 hours  acetaminophen     Tablet .. 650 milliGRAM(s) Oral every 6 hours PRN Temp greater or equal to 38C (100.4F), Mild Pain (1 - 3)  melatonin 3 milliGRAM(s) Oral at bedtime PRN Insomnia  ondansetron Injectable 4 milliGRAM(s) IV Push every 8 hours PRN Nausea and/or Vomiting  QUEtiapine 12.5 milliGRAM(s) Oral at bedtime  aluminum hydroxide/magnesium hydroxide/simethicone Suspension 30 milliLiter(s) Oral every 4 hours PRN Dyspepsia  allopurinol 100 milliGRAM(s) Oral daily  dextrose 50% Injectable 12.5 Gram(s) IV Push once, Stop order after: 1 Doses  dextrose 50% Injectable 25 Gram(s) IV Push once, Stop order after: 1 Doses  dextrose Oral Gel 15 Gram(s) Oral once, Stop order after: 1 Doses PRN Blood Glucose LESS THAN 70 milliGRAM(s)/deciliter  glucagon  Injectable 1 milliGRAM(s) IntraMuscular once, Stop order after: 1 Doses  insulin lispro (ADMELOG) corrective regimen sliding scale   SubCutaneous Before meals and at bedtime  simvastatin 40 milliGRAM(s) Oral at bedtime  dextrose 10% Bolus 125 milliLiter(s) IV Bolus once, Stop order after: 1 Doses  dextrose 5%. 1000 milliLiter(s) (100 mL/Hr) IV Continuous <Continuous>  dextrose 5%. 1000 milliLiter(s) (50 mL/Hr) IV Continuous <Continuous>  heparin   Injectable 5000 Unit(s) SubCutaneous every 8 hours  sodium chloride 0.9% lock flush 3 milliLiter(s) IV Push every 8 hours      LABS:	 	                            10.0   6.72  )-----------( 236      ( 16 Apr 2024 04:30 )             31.0     04-16    140  |  104  |  48.0<H>  ----------------------------<  180<H>  3.9   |  21.0<L>  |  1.71<H>    Ca    8.4      16 Apr 2024 04:30  Phos  4.2     04-15  Mg     2.1     04-15    TPro  5.9<L>  /  Alb  3.5  /  TBili  0.3<L>  /  DBili  x   /  AST  17  /  ALT  14  /  AlkPhos  61  04-16      Lipid Profile:   HgA1c:   TSH:     TELEMETRY: NSR  ECG:    DIAGNOSTIC TESTING:  [ ] Echocardiogram: < from: TTE W or WO Ultrasound Enhancing Agent (04.14.24 @ 14:18) >  TRANSTHORACIC ECHOCARDIOGRAM REPORT  ________________________________________________________________________________                                      _______       Pt. Name:       JERMAN MCKEON Study Date:    4/14/2024  MRN:            [id number]        YOB: 1940  Accession #:    [id number]       Age:           83 years  Account#:       [id number]      Gender:        M  Visit ID#  Heart Rate:                     Height:        72.00 in (182.88 cm)  Rhythm:     Weight:        160.00 lb (72.58 kg)  Blood Pressure: 129/75 mmHg     BSA/BMI:       1.94 m² / 21.70 kg/m²  ________________________________________________________________________________________  Referring Physician:    [id number] Karen Moreno  Interpreting Physician: Marcos Zuleta MD  Primary Sonographer:    Michi Angel Jr    CPT:               ECHO TTE WO CON COMP W DOPP - 05943.m  Indication(s):     Edema, unspecified - R60.9  Procedure:         Transthoracic echocardiogram with 2-D, M-mode and complete                     spectral and color flow Doppler.  Ordering Location: [facility]  Admission Status:  Inpatient    _______________________________________________________________________________________     CONCLUSIONS:      1. Left ventricular systolic function is normal with an ejection fraction visually estimated at 55 to 60 %.   2. There is increased LV mass and eccentric hypertrophy.   3. Normal right ventricular cavity size, with normal wall thickness, and normal systolic function.   4. Low flow, low gradient aortic stenosis with preserved EF. Left ventricular stroke volume is 47.6 ml ;left ventricular stroke volume index is 24.6 ml/m².    < end of copied text >    [ ]  Catheterization:  [ ] Stress Test:    OTHER: 	                                                                [facility] [place] [facility]                                                         CARDIOLOGY AT [facility]                                                                  [address], [place] [address]                                                         Telephone: [phone]. Fax:[phone]                                                                             PROGRESS NOTE    Reason for follow up: severe AS  Update: plan for AMARIS today for further evaluation       Review of symptoms:   Cardiac:  No chest pain. No dyspnea. No palpitations.  Respiratory: no cough. No dyspnea  Gastrointestinal: No diarrhea. No abdominal pain. No bleeding.   Neuro: No focal neuro complaints.    Vitals:  T(C): 36.7 (04-16-24 @ 08:33), Max: 37 (04-15-24 @ 17:19)  HR: 96 (04-16-24 @ 09:29) (91 - 109)  BP: 145/77 (04-16-24 @ 08:33) (104/65 - 145/77)  RR: 18 (04-16-24 @ 08:33) (15 - 18)  SpO2: 94% (04-16-24 @ 09:29) (92% - 98%)  Wt(kg): --  I&O's Summary    15 Apr 2024 07:01  -  16 Apr 2024 07:00  --------------------------------------------------------  IN: 1280 mL / OUT: 830 mL / NET: 450 mL      Weight (kg): 72.6 (04-13 @ 17:33)    PHYSICAL EXAM:  Appearance: Comfortable. No acute distress  HEENT:  Atraumatic. Normocephalic.  Normal oral mucosa  Neurologic: A & O x 3, no gross focal deficits.  Cardiovascular: RRR S1 S2, + murmur, no rubs/gallops. No JVD  Respiratory: Lungs clear to auscultation, unlabored   Gastrointestinal:  Soft, Non-tender, + BS  Lower Extremities: 2+ Peripheral Pulses, No clubbing, cyanosis, or edema  Psychiatry: Patient is calm. No agitation.   Skin: warm and dry.    CURRENT CARDIAC MEDICATIONS:  amLODIPine   Tablet 5 milliGRAM(s) Oral daily  lisinopril 20 milliGRAM(s) Oral daily      CURRENT OTHER MEDICATIONS:  albuterol/ipratropium for Nebulization 3 milliLiter(s) Nebulizer every 6 hours  acetaminophen     Tablet .. 650 milliGRAM(s) Oral every 6 hours PRN Temp greater or equal to 38C (100.4F), Mild Pain (1 - 3)  melatonin 3 milliGRAM(s) Oral at bedtime PRN Insomnia  ondansetron Injectable 4 milliGRAM(s) IV Push every 8 hours PRN Nausea and/or Vomiting  QUEtiapine 12.5 milliGRAM(s) Oral at bedtime  aluminum hydroxide/magnesium hydroxide/simethicone Suspension 30 milliLiter(s) Oral every 4 hours PRN Dyspepsia  allopurinol 100 milliGRAM(s) Oral daily  dextrose 50% Injectable 12.5 Gram(s) IV Push once, Stop order after: 1 Doses  dextrose 50% Injectable 25 Gram(s) IV Push once, Stop order after: 1 Doses  dextrose Oral Gel 15 Gram(s) Oral once, Stop order after: 1 Doses PRN Blood Glucose LESS THAN 70 milliGRAM(s)/deciliter  glucagon  Injectable 1 milliGRAM(s) IntraMuscular once, Stop order after: 1 Doses  insulin lispro (ADMELOG) corrective regimen sliding scale   SubCutaneous Before meals and at bedtime  simvastatin 40 milliGRAM(s) Oral at bedtime  dextrose 10% Bolus 125 milliLiter(s) IV Bolus once, Stop order after: 1 Doses  dextrose 5%. 1000 milliLiter(s) (100 mL/Hr) IV Continuous <Continuous>  dextrose 5%. 1000 milliLiter(s) (50 mL/Hr) IV Continuous <Continuous>  heparin   Injectable 5000 Unit(s) SubCutaneous every 8 hours  sodium chloride 0.9% lock flush 3 milliLiter(s) IV Push every 8 hours      LABS:	 	                            10.0   6.72  )-----------( 236      ( 16 Apr 2024 04:30 )             31.0     04-16    140  |  104  |  48.0<H>  ----------------------------<  180<H>  3.9   |  21.0<L>  |  1.71<H>    Ca    8.4      16 Apr 2024 04:30  Phos  4.2     04-15  Mg     2.1     04-15    TPro  5.9<L>  /  Alb  3.5  /  TBili  0.3<L>  /  DBili  x   /  AST  17  /  ALT  14  /  AlkPhos  61  04-16      Lipid Profile:   HgA1c:   TSH:     TELEMETRY: NSR  ECG:    DIAGNOSTIC TESTING:  [ ] Echocardiogram: < from: TTE W or WO Ultrasound Enhancing Agent (04.14.24 @ 14:18) >  TRANSTHORACIC ECHOCARDIOGRAM REPORT  ________________________________________________________________________________                                      _______       Pt. Name:       JERMAN MCKEON Study Date:    4/14/2024  MRN:            [id number]        YOB: 1940  Accession #:    [id number]       Age:           83 years  Account#:       [id number]      Gender:        M  Visit ID#  Heart Rate:                     Height:        72.00 in (182.88 cm)  Rhythm:     Weight:        160.00 lb (72.58 kg)  Blood Pressure: 129/75 mmHg     BSA/BMI:       1.94 m² / 21.70 kg/m²  ________________________________________________________________________________________  Referring Physician:    [id number] Karen Moreno  Interpreting Physician: Marcos Zuleta MD  Primary Sonographer:    Michi Angel Jr    CPT:               ECHO TTE WO CON COMP W DOPP - 60194.m  Indication(s):     Edema, unspecified - R60.9  Procedure:         Transthoracic echocardiogram with 2-D, M-mode and complete                     spectral and color flow Doppler.  Ordering Location: [facility]  Admission Status:  Inpatient    _______________________________________________________________________________________     CONCLUSIONS:      1. Left ventricular systolic function is normal with an ejection fraction visually estimated at 55 to 60 %.   2. There is increased LV mass and eccentric hypertrophy.   3. Normal right ventricular cavity size, with normal wall thickness, and normal systolic function.   4. Low flow, low gradient aortic stenosis with preserved EF. Left ventricular stroke volume is 47.6 ml ;left ventricular stroke volume index is 24.6 ml/m².    < end of copied text >    [ ]  Catheterization:  [ ] Stress Test:    OTHER:

## 2024-04-16 NOTE — PROGRESS NOTE ADULT - ASSESSMENT
82 y/o M with a PMHx of HTN, HLD, DMII, and gout who presented to [facility] from and urgent care due to worsening shortness of breath and hypoxia. Patient states that for the last few weeks he has been experiencing worsening dyspnea on exertion, cough, and orthopnea. Patient states that he went to his PMD who gave him flonase and zyrtec, but his symptoms continued to worsen. Patient states yesterday he went to urgent care and was noted to be hypoxic, so he was sent to [facility] for further management. Patient noted to have worsening leg swelling as well, hypoxia, and CXR with pulmonary vascular congestion. Patient denies any cardiac workup in the past. Patient denies any fevers, chills, syncope, near syncope, chest pain, abdominal pain, N/V/D, headache, or dizziness.   pBNP 611

## 2024-04-16 NOTE — SWALLOW BEDSIDE ASSESSMENT ADULT - COMMENTS
As per MD note: "84 y/o male with new hypoxia, orthopnea, LE edema, ANUJ, Hx of HTN, HLD, DM-2, Gout  New onset CHF, unknown subtype" Upon ?, pt denied globus sensation &/or solids sticking in esophageal area

## 2024-04-16 NOTE — PROGRESS NOTE ADULT - SUBJECTIVE AND OBJECTIVE BOX
Patient is a 83y old  Male who presents with a chief complaint of New onset CHF  Possible PNA (16 Apr 2024 12:10)    INTERVAL HPI/OVERNIGHT EVENTS: No acute events overnight. HD stable.     MEDICATIONS  (STANDING):  albuterol/ipratropium for Nebulization 3 milliLiter(s) Nebulizer every 6 hours  allopurinol 100 milliGRAM(s) Oral daily  amLODIPine   Tablet 5 milliGRAM(s) Oral daily  dextrose 10% Bolus 125 milliLiter(s) IV Bolus once  dextrose 5%. 1000 milliLiter(s) (100 mL/Hr) IV Continuous <Continuous>  dextrose 5%. 1000 milliLiter(s) (50 mL/Hr) IV Continuous <Continuous>  dextrose 50% Injectable 25 Gram(s) IV Push once  dextrose 50% Injectable 12.5 Gram(s) IV Push once  glucagon  Injectable 1 milliGRAM(s) IntraMuscular once  heparin   Injectable 5000 Unit(s) SubCutaneous every 8 hours  insulin lispro (ADMELOG) corrective regimen sliding scale   SubCutaneous Before meals and at bedtime  lisinopril 20 milliGRAM(s) Oral daily  QUEtiapine 12.5 milliGRAM(s) Oral at bedtime  simvastatin 40 milliGRAM(s) Oral at bedtime  sodium chloride 0.9% lock flush 3 milliLiter(s) IV Push every 8 hours    MEDICATIONS  (PRN):  acetaminophen     Tablet .. 650 milliGRAM(s) Oral every 6 hours PRN Temp greater or equal to 38C (100.4F), Mild Pain (1 - 3)  aluminum hydroxide/magnesium hydroxide/simethicone Suspension 30 milliLiter(s) Oral every 4 hours PRN Dyspepsia  dextrose Oral Gel 15 Gram(s) Oral once PRN Blood Glucose LESS THAN 70 milliGRAM(s)/deciliter  melatonin 3 milliGRAM(s) Oral at bedtime PRN Insomnia  ondansetron Injectable 4 milliGRAM(s) IV Push every 8 hours PRN Nausea and/or Vomiting      Allergies    No Known Allergies    Intolerances        REVIEW OF SYSTEMS: all negative with exception of above    Vital Signs Last 24 Hrs  T(C): 36.6 (16 Apr 2024 12:15), Max: 37 (15 Apr 2024 17:19)  T(F): 97.8 (16 Apr 2024 12:15), Max: 98.6 (15 Apr 2024 17:19)  HR: 104 (16 Apr 2024 12:15) (91 - 109)  BP: 125/73 (16 Apr 2024 12:15) (104/65 - 145/77)  BP(mean): --  RR: 18 (16 Apr 2024 12:15) (15 - 18)  SpO2: 98% (16 Apr 2024 12:15) (92% - 98%)    Parameters below as of 16 Apr 2024 12:15  Patient On (Oxygen Delivery Method): room air        PHYSICAL EXAM:  GENERAL: NAD, well-groomed, well-developed  HEAD:  Atraumatic, Normocephalic  EYES: EOMI, PERRLA, conjunctiva and sclera clear  ENMT: No tonsillar erythema, exudates, or enlargement; Moist mucous membranes, Good dentition, No lesions  NECK: Supple, No JVD, Normal thyroid  NERVOUS SYSTEM:  Alert & Oriented X3, Good concentration; Motor Strength 5/5 B/L upper and lower extremities; DTRs 2+ intact and symmetric  CHEST/LUNG: Clear to percussion bilaterally; No rales, rhonchi, wheezing, or rubs  HEART: Regular rate and rhythm; No murmurs, rubs, or gallops  ABDOMEN: Soft, Nontender, Nondistended; Bowel sounds present  EXTREMITIES:  2+ Peripheral Pulses, No clubbing, cyanosis, or edema  LYMPH: No lymphadenopathy noted  SKIN: No rashes or lesions    LABS:                        10.0   6.72  )-----------( 236      ( 16 Apr 2024 04:30 )             31.0     04-16    140  |  104  |  48.0<H>  ----------------------------<  180<H>  3.9   |  21.0<L>  |  1.71<H>    Ca    8.4      16 Apr 2024 04:30  Phos  4.2     04-15  Mg     2.1     04-15    TPro  5.9<L>  /  Alb  3.5  /  TBili  0.3<L>  /  DBili  x   /  AST  17  /  ALT  14  /  AlkPhos  61  04-16      Urinalysis Basic - ( 16 Apr 2024 04:30 )    Color: x / Appearance: x / SG: x / pH: x  Gluc: 180 mg/dL / Ketone: x  / Bili: x / Urobili: x   Blood: x / Protein: x / Nitrite: x   Leuk Esterase: x / RBC: x / WBC x   Sq Epi: x / Non Sq Epi: x / Bacteria: x      CAPILLARY BLOOD GLUCOSE      POCT Blood Glucose.: 158 mg/dL (16 Apr 2024 12:23)  POCT Blood Glucose.: 214 mg/dL (16 Apr 2024 07:51)  POCT Blood Glucose.: 178 mg/dL (15 Apr 2024 21:53)  POCT Blood Glucose.: 233 mg/dL (15 Apr 2024 16:38)  POCT Blood Glucose.: 192 mg/dL (15 Apr 2024 13:07)      RADIOLOGY & ADDITIONAL TESTS:    Imaging Personally Reviewed:  [ ] YES  [ ] NO    Consultant(s) Notes Reviewed:  [ ] YES  [ ] NO    Care Discussed with Consultants/Other Providers [ ] YES  [ ] NO Patient is a 83y old  Male who presents with a chief complaint of New onset CHF  Possible PNA (16 Apr 2024 12:10)    INTERVAL HPI/OVERNIGHT EVENTS: No acute events overnight. HD stable.     MEDICATIONS  (STANDING):  albuterol/ipratropium for Nebulization 3 milliLiter(s) Nebulizer every 6 hours  allopurinol 100 milliGRAM(s) Oral daily  amLODIPine   Tablet 5 milliGRAM(s) Oral daily  dextrose 10% Bolus 125 milliLiter(s) IV Bolus once  dextrose 5%. 1000 milliLiter(s) (100 mL/Hr) IV Continuous <Continuous>  dextrose 5%. 1000 milliLiter(s) (50 mL/Hr) IV Continuous <Continuous>  dextrose 50% Injectable 25 Gram(s) IV Push once  dextrose 50% Injectable 12.5 Gram(s) IV Push once  glucagon  Injectable 1 milliGRAM(s) IntraMuscular once  heparin   Injectable 5000 Unit(s) SubCutaneous every 8 hours  insulin lispro (ADMELOG) corrective regimen sliding scale   SubCutaneous Before meals and at bedtime  lisinopril 20 milliGRAM(s) Oral daily  QUEtiapine 12.5 milliGRAM(s) Oral at bedtime  simvastatin 40 milliGRAM(s) Oral at bedtime  sodium chloride 0.9% lock flush 3 milliLiter(s) IV Push every 8 hours    MEDICATIONS  (PRN):  acetaminophen     Tablet .. 650 milliGRAM(s) Oral every 6 hours PRN Temp greater or equal to 38C (100.4F), Mild Pain (1 - 3)  aluminum hydroxide/magnesium hydroxide/simethicone Suspension 30 milliLiter(s) Oral every 4 hours PRN Dyspepsia  dextrose Oral Gel 15 Gram(s) Oral once PRN Blood Glucose LESS THAN 70 milliGRAM(s)/deciliter  melatonin 3 milliGRAM(s) Oral at bedtime PRN Insomnia  ondansetron Injectable 4 milliGRAM(s) IV Push every 8 hours PRN Nausea and/or Vomiting      Allergies    No Known Allergies    Intolerances        REVIEW OF SYSTEMS: all negative with exception of above    Vital Signs Last 24 Hrs  T(C): 36.6 (16 Apr 2024 12:15), Max: 37 (15 Apr 2024 17:19)  T(F): 97.8 (16 Apr 2024 12:15), Max: 98.6 (15 Apr 2024 17:19)  HR: 104 (16 Apr 2024 12:15) (91 - 109)  BP: 125/73 (16 Apr 2024 12:15) (104/65 - 145/77)  BP(mean): --  RR: 18 (16 Apr 2024 12:15) (15 - 18)  SpO2: 98% (16 Apr 2024 12:15) (92% - 98%)    Parameters below as of 16 Apr 2024 12:15  Patient On (Oxygen Delivery Method): room air        PHYSICAL EXAM:  CONSTITUTIONAL: NAD,  well-groomed  HEENMT: NC/AT, PERRLA, EOMI, Moist oral mucosa, no pharyngeal injection or exudates; normal dentition  RESPIRATORY: BLAE, bibasilar rhonci  CARDIOVASCULAR: S1/S2+, RRR, +Systolic Murmur present  ABDOMEN: Soft, NT/ND, BS+, No guarding  MSK:  Moves limbs with purpose and direction; no clubbing or cyanosis of digits; no joint swelling or tenderness to palpation  PSYCH: normal mood and affect  NEUROLOGY: AAOx4, CN 2-12 are intact and symmetric; no gross sensory deficits;   SKIN: Warm, Dry, No rashes; no palpable lesions      LABS:                        10.0   6.72  )-----------( 236      ( 16 Apr 2024 04:30 )             31.0     04-16    140  |  104  |  48.0<H>  ----------------------------<  180<H>  3.9   |  21.0<L>  |  1.71<H>    Ca    8.4      16 Apr 2024 04:30  Phos  4.2     04-15  Mg     2.1     04-15    TPro  5.9<L>  /  Alb  3.5  /  TBili  0.3<L>  /  DBili  x   /  AST  17  /  ALT  14  /  AlkPhos  61  04-16      Urinalysis Basic - ( 16 Apr 2024 04:30 )    Color: x / Appearance: x / SG: x / pH: x  Gluc: 180 mg/dL / Ketone: x  / Bili: x / Urobili: x   Blood: x / Protein: x / Nitrite: x   Leuk Esterase: x / RBC: x / WBC x   Sq Epi: x / Non Sq Epi: x / Bacteria: x      CAPILLARY BLOOD GLUCOSE      POCT Blood Glucose.: 158 mg/dL (16 Apr 2024 12:23)  POCT Blood Glucose.: 214 mg/dL (16 Apr 2024 07:51)  POCT Blood Glucose.: 178 mg/dL (15 Apr 2024 21:53)  POCT Blood Glucose.: 233 mg/dL (15 Apr 2024 16:38)  POCT Blood Glucose.: 192 mg/dL (15 Apr 2024 13:07)      RADIOLOGY & ADDITIONAL TESTS:    Imaging Personally Reviewed:  [ ] YES  [ ] NO    Consultant(s) Notes Reviewed:  [ ] YES  [ ] NO    Care Discussed with Consultants/Other Providers [ ] YES  [ ] NO

## 2024-04-16 NOTE — SWALLOW BEDSIDE ASSESSMENT ADULT - SLP GENERAL OBSERVATIONS
Pt received & seen seated upright in bed, awake/alert, pleasant/cooperative, ? reduced cognition, decreased hearing acuity, daughter & son-in-law present, 0/10 pain pre/post

## 2024-04-17 DIAGNOSIS — I25.10 ATHEROSCLEROTIC HEART DISEASE OF NATIVE CORONARY ARTERY WITHOUT ANGINA PECTORIS: ICD-10-CM

## 2024-04-17 DIAGNOSIS — I35.0 NONRHEUMATIC AORTIC (VALVE) STENOSIS: ICD-10-CM

## 2024-04-17 DIAGNOSIS — I50.33 ACUTE ON CHRONIC DIASTOLIC (CONGESTIVE) HEART FAILURE: ICD-10-CM

## 2024-04-17 DIAGNOSIS — N17.9 ACUTE KIDNEY FAILURE, UNSPECIFIED: ICD-10-CM

## 2024-04-17 LAB
ALBUMIN SERPL ELPH-MCNC: 3.6 G/DL — SIGNIFICANT CHANGE UP (ref 3.3–5.2)
ALP SERPL-CCNC: 67 U/L — SIGNIFICANT CHANGE UP (ref 40–120)
ALT FLD-CCNC: 11 U/L — SIGNIFICANT CHANGE UP
ANION GAP SERPL CALC-SCNC: 14 MMOL/L — SIGNIFICANT CHANGE UP (ref 5–17)
APPEARANCE UR: CLEAR — SIGNIFICANT CHANGE UP
AST SERPL-CCNC: 13 U/L — SIGNIFICANT CHANGE UP
BILIRUB SERPL-MCNC: 0.3 MG/DL — LOW (ref 0.4–2)
BILIRUB UR-MCNC: NEGATIVE — SIGNIFICANT CHANGE UP
BUN SERPL-MCNC: 52.1 MG/DL — HIGH (ref 8–20)
CALCIUM SERPL-MCNC: 8.6 MG/DL — SIGNIFICANT CHANGE UP (ref 8.4–10.5)
CHLORIDE SERPL-SCNC: 101 MMOL/L — SIGNIFICANT CHANGE UP (ref 96–108)
CHLORIDE UR-SCNC: <27 MMOL/L — SIGNIFICANT CHANGE UP
CO2 SERPL-SCNC: 23 MMOL/L — SIGNIFICANT CHANGE UP (ref 22–29)
COLOR SPEC: YELLOW — SIGNIFICANT CHANGE UP
CREAT SERPL-MCNC: 1.65 MG/DL — HIGH (ref 0.5–1.3)
DIFF PNL FLD: NEGATIVE — SIGNIFICANT CHANGE UP
EGFR: 41 ML/MIN/1.73M2 — LOW
GLUCOSE BLDC GLUCOMTR-MCNC: 161 MG/DL — HIGH (ref 70–99)
GLUCOSE BLDC GLUCOMTR-MCNC: 202 MG/DL — HIGH (ref 70–99)
GLUCOSE BLDC GLUCOMTR-MCNC: 348 MG/DL — HIGH (ref 70–99)
GLUCOSE SERPL-MCNC: 161 MG/DL — HIGH (ref 70–99)
GLUCOSE UR QL: NEGATIVE MG/DL — SIGNIFICANT CHANGE UP
HCT VFR BLD CALC: 32.3 % — LOW (ref 39–50)
HGB BLD-MCNC: 10.4 G/DL — LOW (ref 13–17)
KETONES UR-MCNC: NEGATIVE MG/DL — SIGNIFICANT CHANGE UP
LEUKOCYTE ESTERASE UR-ACNC: NEGATIVE — SIGNIFICANT CHANGE UP
MCHC RBC-ENTMCNC: 29.2 PG — SIGNIFICANT CHANGE UP (ref 27–34)
MCHC RBC-ENTMCNC: 32.2 GM/DL — SIGNIFICANT CHANGE UP (ref 32–36)
MCV RBC AUTO: 90.7 FL — SIGNIFICANT CHANGE UP (ref 80–100)
NITRITE UR-MCNC: NEGATIVE — SIGNIFICANT CHANGE UP
OSMOLALITY UR: 614 MOSM/KG — SIGNIFICANT CHANGE UP (ref 300–1000)
PH UR: 6 — SIGNIFICANT CHANGE UP (ref 5–8)
PLATELET # BLD AUTO: 225 K/UL — SIGNIFICANT CHANGE UP (ref 150–400)
POTASSIUM SERPL-MCNC: 4.2 MMOL/L — SIGNIFICANT CHANGE UP (ref 3.5–5.3)
POTASSIUM SERPL-SCNC: 4.2 MMOL/L — SIGNIFICANT CHANGE UP (ref 3.5–5.3)
POTASSIUM UR-SCNC: 41 MMOL/L — SIGNIFICANT CHANGE UP
PROT SERPL-MCNC: 5.8 G/DL — LOW (ref 6.6–8.7)
PROT UR-MCNC: NEGATIVE MG/DL — SIGNIFICANT CHANGE UP
RBC # BLD: 3.56 M/UL — LOW (ref 4.2–5.8)
RBC # FLD: 15 % — HIGH (ref 10.3–14.5)
SODIUM SERPL-SCNC: 138 MMOL/L — SIGNIFICANT CHANGE UP (ref 135–145)
SODIUM UR-SCNC: 45 MMOL/L — SIGNIFICANT CHANGE UP
SP GR SPEC: 1.02 — SIGNIFICANT CHANGE UP (ref 1–1.03)
UROBILINOGEN FLD QL: 0.2 MG/DL — SIGNIFICANT CHANGE UP (ref 0.2–1)
WBC # BLD: 7.36 K/UL — SIGNIFICANT CHANGE UP (ref 3.8–10.5)
WBC # FLD AUTO: 7.36 K/UL — SIGNIFICANT CHANGE UP (ref 3.8–10.5)

## 2024-04-17 PROCEDURE — 74220 X-RAY XM ESOPHAGUS 1CNTRST: CPT | Mod: 26

## 2024-04-17 PROCEDURE — 99235 HOSP IP/OBS SAME DATE MOD 70: CPT

## 2024-04-17 PROCEDURE — 99222 1ST HOSP IP/OBS MODERATE 55: CPT

## 2024-04-17 PROCEDURE — 99233 SBSQ HOSP IP/OBS HIGH 50: CPT

## 2024-04-17 RX ORDER — SODIUM CHLORIDE 9 MG/ML
1000 INJECTION INTRAMUSCULAR; INTRAVENOUS; SUBCUTANEOUS
Refills: 0 | Status: DISCONTINUED | OUTPATIENT
Start: 2024-04-17 | End: 2024-04-17

## 2024-04-17 RX ORDER — METOPROLOL TARTRATE 50 MG
1 TABLET ORAL
Qty: 30 | Refills: 0
Start: 2024-04-17 | End: 2024-05-16

## 2024-04-17 RX ORDER — SODIUM CHLORIDE 9 MG/ML
1000 INJECTION, SOLUTION INTRAVENOUS
Refills: 0 | Status: DISCONTINUED | OUTPATIENT
Start: 2024-04-17 | End: 2024-04-19

## 2024-04-17 RX ORDER — LANOLIN ALCOHOL/MO/W.PET/CERES
1 CREAM (GRAM) TOPICAL
Qty: 0 | Refills: 0 | DISCHARGE
Start: 2024-04-17

## 2024-04-17 RX ORDER — QUETIAPINE FUMARATE 200 MG/1
0.5 TABLET, FILM COATED ORAL
Qty: 15 | Refills: 0
Start: 2024-04-17 | End: 2024-05-16

## 2024-04-17 RX ORDER — AMLODIPINE BESYLATE 2.5 MG/1
2.5 TABLET ORAL AT BEDTIME
Refills: 0 | Status: DISCONTINUED | OUTPATIENT
Start: 2024-04-17 | End: 2024-04-20

## 2024-04-17 RX ADMIN — Medication 8: at 17:13

## 2024-04-17 RX ADMIN — Medication 3 MILLILITER(S): at 02:41

## 2024-04-17 RX ADMIN — Medication 3 MILLILITER(S): at 09:21

## 2024-04-17 RX ADMIN — AMLODIPINE BESYLATE 2.5 MILLIGRAM(S): 2.5 TABLET ORAL at 22:02

## 2024-04-17 RX ADMIN — SODIUM CHLORIDE 60 MILLILITER(S): 9 INJECTION, SOLUTION INTRAVENOUS at 13:32

## 2024-04-17 RX ADMIN — SIMVASTATIN 40 MILLIGRAM(S): 20 TABLET, FILM COATED ORAL at 22:01

## 2024-04-17 RX ADMIN — HEPARIN SODIUM 5000 UNIT(S): 5000 INJECTION INTRAVENOUS; SUBCUTANEOUS at 22:03

## 2024-04-17 RX ADMIN — Medication 650 MILLIGRAM(S): at 13:30

## 2024-04-17 RX ADMIN — SODIUM CHLORIDE 3 MILLILITER(S): 9 INJECTION INTRAMUSCULAR; INTRAVENOUS; SUBCUTANEOUS at 22:00

## 2024-04-17 RX ADMIN — SODIUM CHLORIDE 3 MILLILITER(S): 9 INJECTION INTRAMUSCULAR; INTRAVENOUS; SUBCUTANEOUS at 06:56

## 2024-04-17 RX ADMIN — Medication 3 MILLILITER(S): at 14:42

## 2024-04-17 RX ADMIN — Medication 650 MILLIGRAM(S): at 05:38

## 2024-04-17 RX ADMIN — Medication 4: at 08:27

## 2024-04-17 RX ADMIN — QUETIAPINE FUMARATE 12.5 MILLIGRAM(S): 200 TABLET, FILM COATED ORAL at 22:01

## 2024-04-17 RX ADMIN — Medication 3 MILLILITER(S): at 21:19

## 2024-04-17 RX ADMIN — AMLODIPINE BESYLATE 5 MILLIGRAM(S): 2.5 TABLET ORAL at 05:38

## 2024-04-17 RX ADMIN — SODIUM CHLORIDE 3 MILLILITER(S): 9 INJECTION INTRAMUSCULAR; INTRAVENOUS; SUBCUTANEOUS at 13:18

## 2024-04-17 RX ADMIN — Medication 100 MILLIGRAM(S): at 13:29

## 2024-04-17 RX ADMIN — Medication 650 MILLIGRAM(S): at 22:02

## 2024-04-17 RX ADMIN — HEPARIN SODIUM 5000 UNIT(S): 5000 INJECTION INTRAVENOUS; SUBCUTANEOUS at 13:30

## 2024-04-17 NOTE — DISCHARGE NOTE PROVIDER - CARE PROVIDERS DIRECT ADDRESSES
[email or web address]

## 2024-04-17 NOTE — CONSULT NOTE ADULT - PROBLEM SELECTOR RECOMMENDATION 3
monitor renal function  nephro following  diuretic holiday, ACEi deferred  C planned for AM, will need to monitor renal function pending TAVR CTA
- Continue statin.   - Check fasting lipid profile in the AM.

## 2024-04-17 NOTE — DISCHARGE NOTE PROVIDER - NSDCCPCAREPLAN_GEN_ALL_CORE_FT
PRINCIPAL DISCHARGE DIAGNOSIS  Diagnosis: Acute diastolic congestive heart failure  Assessment and Plan of Treatment: s/p diuresis.  Continue medications as prescribed.  Follow up with Cardio.      SECONDARY DISCHARGE DIAGNOSES  Diagnosis: Severe aortic stenosis  Assessment and Plan of Treatment: TAVR work up complete.  Follow up with CTS.

## 2024-04-17 NOTE — DISCHARGE NOTE PROVIDER - HOSPITAL COURSE
82 y/o male with new hypoxia, orthopnea, LE edema, ANUJ, Hx of HTN, HLD, DM-2, Gout    New onset CHF, unknown subtype:  -No fever, no leucocytosis, no pleurisy or sputum  - IV Diuresis w/ Lasix  - TTE reviewed  - US duplex LE negative for DVT  -Cont. ACE, hold BB pending 2D echo  -Daily weights, daily BMP/lytes  -Cardiology recs- appreciated- AMARIS planned for 4/17, pending S/S eval for clearance  - CTS c/s placed  -OOB, ambulate, fall risk  -DVT-P w/ vc boots sub Q heparin    ANDREW  - likely pre-renal  -Avoid hypotension     HTN:  -Cont. O/P regimen  -DASH diet    HLD:  -Statin    DM-2:  -ADA diet, cont meds     Gout:  -Cont. Allopurinol 84 y/o male sent in from urgent care for hypoxia after was recently seen by PMD for c/o cough/SOB. No recent travel, sick contacts or changes in medications. No hx of PNA/aspiration and no reports of coughing with eating. Patient hx of sig for DM-2, HTN, HLD, Gout. Patient able to do most of his ADLs without assistive devices, no reports of falls. He was put on Flonase and Zyrtec by his PMD before going to  today. In the ED noted to by hypoxic to the high 80s at rest. No fever, chills, N/V or CP. Also admits to intermittent LE edema, and orthopnea. He was given steroids/nebs in ED with no improvement. CXR with PVC, and small right pleural effusion. No leucocytosis or shift. Flu/Covid/RSV neg.  Admitted with Acute Heart Failure. Started on IV Lasix. ECHO with preserved EF. Found to have Severe Aortic Stenosis. Seen by CTS and had TAVR work up including RHC / LHC. 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 Seen by ENT and was recommended outpatient follow up for esophageal dilation if dysphagia worsens.   His symptoms have improved and he is feeling much better. He was treated for ANDREW during hospitalization. He is stable for discharge with close outpatient follow ups.

## 2024-04-17 NOTE — DISCHARGE NOTE PROVIDER - ATTENDING DISCHARGE PHYSICAL EXAMINATION:
Vital Signs   T(C): 36.7 (20 Apr 2024 08:28), Max: 36.7 (20 Apr 2024 08:28)  T(F): 98 (20 Apr 2024 08:28), Max: 98 (20 Apr 2024 08:28)  HR: 81 (20 Apr 2024 09:08) (81 - 102)  BP: 138/75 (20 Apr 2024 08:28) (126/79 - 157/76)  BP(mean): 88 (19 Apr 2024 17:40) (88 - 88)  RR: 18 (20 Apr 2024 08:28) (18 - 20)  SpO2: 100% (20 Apr 2024 09:08) (93% - 100%)  Parameters below as of 20 Apr 2024 09:08  Patient On (Oxygen Delivery Method): room air  General: Elderly male sitting in chair comfortably. No acute distress  HEENT: EOMI. Clear conjunctivae. Moist mucus membrane. Hard of hearing.   Neck: Supple.   Chest: Good air entry. No wheezing, rales or rhonchi.   Heart: Normal S1 & S2. RRR. Systolic murmur.   Abdomen: Non distended. Soft. Non-tender. + BS  Ext: No pedal edema. No calf tenderness   Neuro: Awake and alert. No focal deficit. Speech clear.   Skin: Warm and Dry  Psychiatry: Normal mood and affect

## 2024-04-17 NOTE — PROGRESS NOTE ADULT - SUBJECTIVE AND OBJECTIVE BOX
JERMAN MCKEON Patient is a 83y old  Male who presents with a chief complaint of New onset CHF  Possible PNA (17 Apr 2024 13:05)     HPI:  82 y/o male sent in from urgent care for hypoxia after was recently seen by PMD for c/o cough/SOB. No recent travel, sick contacts or changes in medications. No hx of PNA/aspiration and no reports of coughing with eating. Patient hx of sig for DM-2, HTN, HLD, Gout. Patient able to do most of his ADLs without assistive devices, no reports of falls. He was put on Flonase and Zyrtec by his PMD before going to  today. In the ED noted to by hypoxic to the high 80s at rest. No fever, chills, N/V or CP. Also admits to intermittent LE edema, and orthopnea. He was given steroids/nebs in ED with no improvement. CXR with PVC, and small right pleural effusion. No leucocytosis or shift. Flu/Covid/RSV neg. Patient also noted to have harsh ANUJ with no hx of murmur reported by family. Denies any other complaints at this time.  (14 Apr 2024 00:45)    The patient was seen and evaluated   The patient is in no acute distress.  Denied any fever chest pain, palpitations, shortness of breath, abdominal pain, fever, dysuria, cough, edema   Complains of having difficulty with a big swallow - states he's OK but still has some cough and he tries to bring up     I&O's Summary    16 Apr 2024 07:01  -  17 Apr 2024 07:00  --------------------------------------------------------  IN: 960 mL / OUT: 0 mL / NET: 960 mL    17 Apr 2024 07:01  -  17 Apr 2024 16:23  --------------------------------------------------------  IN: 120 mL / OUT: 0 mL / NET: 120 mL      Allergies    No Known Allergies    Intolerances      HEALTH ISSUES - PROBLEM Dx:  Acute CHF    Hypertension    Hyperlipidemia    Aortic stenosis    Severe aortic stenosis    CAD (coronary artery disease)          PAST MEDICAL & SURGICAL HISTORY:  HTN (hypertension)      HLD (hyperlipidemia)      Gout      Diabetes      No significant past surgical history              Vital Signs Last 24 Hrs  T(C): 36.6 (17 Apr 2024 08:19), Max: 36.6 (16 Apr 2024 21:00)  T(F): 97.8 (17 Apr 2024 08:19), Max: 97.9 (16 Apr 2024 21:00)  HR: 102 (17 Apr 2024 14:43) (88 - 102)  BP: 121/71 (17 Apr 2024 08:19) (100/64 - 121/71)  BP(mean): 88 (16 Apr 2024 21:00) (88 - 88)  RR: 18 (17 Apr 2024 08:19) (18 - 18)  SpO2: 92% (17 Apr 2024 14:43) (92% - 99%)    Parameters below as of 17 Apr 2024 14:43  Patient On (Oxygen Delivery Method): nasal cannula    T(C): 36.6 (04-17-24 @ 08:19), Max: 36.6 (04-16-24 @ 21:00)  HR: 102 (04-17-24 @ 14:43) (88 - 102)  BP: 121/71 (04-17-24 @ 08:19) (100/64 - 121/71)  RR: 18 (04-17-24 @ 08:19) (18 - 18)  SpO2: 92% (04-17-24 @ 14:43) (92% - 99%)  Wt(kg): --    PHYSICAL EXAM:    GENERAL: NAD, sitting in chair conversing pleasant   HEAD:  Atraumatic, Normocephalic  EYES: EOMI, PERRL, conjunctiva and sclera clear  ENMT:  Moist mucous membranes, NECK: Supple,   NERVOUS SYSTEM:  Alert & Oriented X3,  Moves upper and lower extremities; CNS-II-XII  CHEST/LUNG: Clear to auscultation bilaterally; No rales, rhonchi, wheezing,   HEART: Regular rate and rhythm; No murmurs,   ABDOMEN: Soft, Nontender, Nondistended; Bowel sounds present  EXTREMITIES:  Peripheral Pulses, No  cyanosis, or edema  psychiatry- mood and affect appropriate, Insight and judgement intact     acetaminophen     Tablet .. 650 milliGRAM(s) Oral every 6 hours PRN  albuterol/ipratropium for Nebulization 3 milliLiter(s) Nebulizer every 6 hours  allopurinol 100 milliGRAM(s) Oral daily  aluminum hydroxide/magnesium hydroxide/simethicone Suspension 30 milliLiter(s) Oral every 4 hours PRN  amLODIPine   Tablet 2.5 milliGRAM(s) Oral at bedtime  dextrose 10% Bolus 125 milliLiter(s) IV Bolus once  dextrose 5% + sodium chloride 0.9%. 1000 milliLiter(s) IV Continuous <Continuous>  dextrose 5%. 1000 milliLiter(s) IV Continuous <Continuous>  dextrose 5%. 1000 milliLiter(s) IV Continuous <Continuous>  dextrose 50% Injectable 25 Gram(s) IV Push once  dextrose 50% Injectable 12.5 Gram(s) IV Push once  dextrose Oral Gel 15 Gram(s) Oral once PRN  glucagon  Injectable 1 milliGRAM(s) IntraMuscular once  heparin   Injectable 5000 Unit(s) SubCutaneous every 8 hours  insulin lispro (ADMELOG) corrective regimen sliding scale   SubCutaneous Before meals and at bedtime  melatonin 3 milliGRAM(s) Oral at bedtime PRN  ondansetron Injectable 4 milliGRAM(s) IV Push every 8 hours PRN  QUEtiapine 12.5 milliGRAM(s) Oral at bedtime  simvastatin 40 milliGRAM(s) Oral at bedtime  sodium bicarbonate 650 milliGRAM(s) Oral every 8 hours  sodium chloride 0.9% lock flush 3 milliLiter(s) IV Push every 8 hours      LABS:                          10.4   7.36  )-----------( 225      ( 17 Apr 2024 05:16 )             32.3     04-17    138  |  101  |  52.1<H>  ----------------------------<  161<H>  4.2   |  23.0  |  1.65<H>    Ca    8.6      17 Apr 2024 05:16    TPro  5.8<L>  /  Alb  3.6  /  TBili  0.3<L>  /  DBili  x   /  AST  13  /  ALT  11  /  AlkPhos  67  04-17    LIVER FUNCTIONS - ( 17 Apr 2024 05:16 )  Alb: 3.6 g/dL / Pro: 5.8 g/dL / ALK PHOS: 67 U/L / ALT: 11 U/L / AST: 13 U/L / GGT: x                 Urinalysis Basic - ( 17 Apr 2024 05:16 )    Color: x / Appearance: x / SG: x / pH: x  Gluc: 161 mg/dL / Ketone: x  / Bili: x / Urobili: x   Blood: x / Protein: x / Nitrite: x   Leuk Esterase: x / RBC: x / WBC x   Sq Epi: x / Non Sq Epi: x / Bacteria: x      CAPILLARY BLOOD GLUCOSE      POCT Blood Glucose.: 202 mg/dL (17 Apr 2024 08:26)  POCT Blood Glucose.: 205 mg/dL (16 Apr 2024 22:21)  POCT Blood Glucose.: 298 mg/dL (16 Apr 2024 17:22)      RADIOLOGY & ADDITIONAL TESTS:      Consultant notes reviewed    Case discussed with consultant/provider/ family /patient

## 2024-04-17 NOTE — CONSULT NOTE ADULT - PROBLEM SELECTOR RECOMMENDATION 2
Management per primary team/cardiology  not in exacerbation, appears euvolemic
- Well controlled.   - Continue norvasc and lisinopril.   - Will adjust meds based on echocardiogram results.

## 2024-04-17 NOTE — DISCHARGE NOTE PROVIDER - NSDCFUSCHEDAPPT_GEN_ALL_CORE_FT
Bridgett Hughes  [facility]  [facility] [address]  Scheduled Appointment: 07/08/2024

## 2024-04-17 NOTE — DISCHARGE NOTE PROVIDER - PROVIDER TOKENS
PROVIDER:[TOKEN:[[id number]],FOLLOWUP:[1 week],ESTABLISHEDPATIENT:[T]],PROVIDER:[TOKEN:[[id number]],FOLLOWUP:[2 weeks]],PROVIDER:[TOKEN:[[id number]],FOLLOWUP:[2 weeks]],PROVIDER:[TOKEN:[[id number]],FOLLOWUP:[1 month]]

## 2024-04-17 NOTE — DISCHARGE NOTE PROVIDER - CARE PROVIDER_API CALL
Bridgett Hughes  Family Medicine  [address], [address]  [place] [address]  Phone: [phone]  Fax: [phone]  Established Patient  Follow Up Time: 1 week    Adriano Guillen  Thoracic and Cardiac Surgery  [address] [place] [address]  Phone: [phone]  Fax: [phone]  Follow Up Time: 2 weeks    Vignesh Hartman  Cardiovascular Disease  [address], [address]  [place] [address]  Phone: [phone]  Fax: [phone]  Follow Up Time: 2 weeks    Gino Ronquillo  Otolaryngology  [address], [address]  [place] [address]  Phone: [phone]  Fax: [phone]  Follow Up Time: 1 month

## 2024-04-17 NOTE — DISCHARGE NOTE PROVIDER - NSDCMRMEDTOKEN_GEN_ALL_CORE_FT
allopurinol 100 mg oral tablet: orally once a day  glimepiride 1 mg oral tablet: 1 tab(s) orally once a day  Januvia 100 mg oral tablet: 1 tab(s) orally once a day  lisinopril 20 mg oral tablet: 1 tab(s) orally once a day  Norvasc 5 mg oral tablet: 1 tab(s) orally once a day  Zocor 40 mg oral tablet: 1 tab(s) orally once a day   allopurinol 100 mg oral tablet: orally once a day  glimepiride 1 mg oral tablet: 1 tab(s) orally once a day  Januvia 100 mg oral tablet: 1 tab(s) orally once a day  lisinopril 20 mg oral tablet: 1 tab(s) orally once a day  melatonin 3 mg oral tablet: 1 tab(s) orally once a day (at bedtime) As needed Insomnia  Norvasc 5 mg oral tablet: 1 tab(s) orally once a day  Seroquel 25 mg oral tablet: 0.5 tab(s) orally once a day (at bedtime)  Zocor 40 mg oral tablet: 1 tab(s) orally once a day   allopurinol 100 mg oral tablet: orally once a day  glimepiride 1 mg oral tablet: 1 tab(s) orally once a day  Januvia 100 mg oral tablet: 1 tab(s) orally once a day  lisinopril 20 mg oral tablet: 1 tab(s) orally once a day  metFORMIN 1000 mg oral tablet: 1 tab(s) orally 2 times a day  metoprolol tartrate 25 mg oral tablet: 0.5 tab(s) orally 2 times a day  Norvasc 5 mg oral tablet: 1 tab(s) orally once a day  Seroquel 25 mg oral tablet: 0.5 tab(s) orally once a day (at bedtime)  Zocor 40 mg oral tablet: 1 tab(s) orally once a day

## 2024-04-17 NOTE — PROGRESS NOTE ADULT - ASSESSMENT
82 y/o M with a PMHx of HTN, HLD, DMII, and gout who presented to [facility] from and urgent care due to worsening shortness of breath and hypoxia. Patient states that for the last few weeks he has been experiencing worsening dyspnea on exertion, cough, and orthopnea. Patient states that he went to his PMD who gave him flonase and zyrtec, but his symptoms continued to worsen. Patient states yesterday he went to urgent care and was noted to be hypoxic, so he was sent to [facility] for further management. Patient noted to have worsening leg swelling as well, hypoxia, and CXR with pulmonary vascular congestion. Patient denies any cardiac workup in the past. Patient denies any fevers, chills, syncope, near syncope, chest pain, abdominal pain, N/V/D, headache, or dizziness.   pBNP 617

## 2024-04-17 NOTE — PROGRESS NOTE ADULT - ASSESSMENT
84 y/o male with new hypoxia, orthopnea, LE edema, ANUJ, Hx of HTN, HLD, DM-2, Gout    Dysphagia -needs evaluation by  esophogram today    DW GI and speech recommendations ordered - will follow results for further recommendations     New onset CHF, Severe AS- pending AMARIS to assess aortic valve   - TTE reviewed  - US duplex LE negative for DVT  -Cont. ACE, hold BB pending 2D echo  -Daily weights, daily BMP/lytes  -Cardiology recs- appreciated- AMARIS planned for 4/17, pending esophogram per GI S/S eval for clearance  - CTS c/s placed  -OOB, ambulate, fall risk    -DVT-P w/ vc boots sub Q heparin    ANDREW with AGMA   sodium bicarb 650 every 8 hours   - likely pre-renal  -Avoid hypotension     HTN:  -Cont. O/P regimen  -DASH diet    HLD:  -Statin    DM-2:  -ADA diet, cont meds     Gout:  -Cont. Allopurinol

## 2024-04-17 NOTE — PROGRESS NOTE ADULT - SUBJECTIVE AND OBJECTIVE BOX
[facility]                                                         CARDIOLOGY AT [facility]                                                                  [address], [place] [address]                                                         Telephone: [phone]. Fax:[phone]                                                                             PROGRESS NOTE    Reason for follow up: Severe AS   Update: pending esophagram today, AMARIS tomorrow. discussed with patient and family      Review of symptoms:   Cardiac:  No chest pain. No dyspnea. No palpitations.  Respiratory: no cough. No dyspnea  Gastrointestinal: No diarrhea. No abdominal pain. No bleeding.   Neuro: No focal neuro complaints.    Vitals:  T(C): 36.6 (04-17-24 @ 08:19), Max: 36.7 (04-16-24 @ 15:43)  HR: 100 (04-17-24 @ 08:19) (88 - 109)  BP: 121/71 (04-17-24 @ 08:19) (92/56 - 125/73)  RR: 18 (04-17-24 @ 08:19) (16 - 18)  SpO2: 93% (04-17-24 @ 08:19) (93% - 99%)  Wt(kg): --  I&O's Summary    16 Apr 2024 07:01  -  17 Apr 2024 07:00  --------------------------------------------------------  IN: 960 mL / OUT: 0 mL / NET: 960 mL      Weight (kg): 72.6 (04-13 @ 17:33)    PHYSICAL EXAM:  Appearance: Comfortable. No acute distress  HEENT:  Atraumatic. Normocephalic.  Normal oral mucosa  Neurologic: A & O x 3, no gross focal deficits.  Cardiovascular: RRR S1 S2,+ murmur, no rubs/gallops. No JVD  Respiratory: Lungs clear to auscultation, unlabored   Gastrointestinal:  Soft, Non-tender, + BS  Lower Extremities: 2+ Peripheral Pulses, No clubbing, cyanosis, or edema  Psychiatry: Patient is calm. No agitation.   Skin: warm and dry.    CURRENT CARDIAC MEDICATIONS:  amLODIPine   Tablet 5 milliGRAM(s) Oral daily      CURRENT OTHER MEDICATIONS:  albuterol/ipratropium for Nebulization 3 milliLiter(s) Nebulizer every 6 hours  acetaminophen     Tablet .. 650 milliGRAM(s) Oral every 6 hours PRN Temp greater or equal to 38C (100.4F), Mild Pain (1 - 3)  melatonin 3 milliGRAM(s) Oral at bedtime PRN Insomnia  ondansetron Injectable 4 milliGRAM(s) IV Push every 8 hours PRN Nausea and/or Vomiting  QUEtiapine 12.5 milliGRAM(s) Oral at bedtime  aluminum hydroxide/magnesium hydroxide/simethicone Suspension 30 milliLiter(s) Oral every 4 hours PRN Dyspepsia  allopurinol 100 milliGRAM(s) Oral daily  dextrose 50% Injectable 12.5 Gram(s) IV Push once, Stop order after: 1 Doses  dextrose 50% Injectable 25 Gram(s) IV Push once, Stop order after: 1 Doses  dextrose Oral Gel 15 Gram(s) Oral once, Stop order after: 1 Doses PRN Blood Glucose LESS THAN 70 milliGRAM(s)/deciliter  glucagon  Injectable 1 milliGRAM(s) IntraMuscular once, Stop order after: 1 Doses  insulin lispro (ADMELOG) corrective regimen sliding scale   SubCutaneous Before meals and at bedtime  simvastatin 40 milliGRAM(s) Oral at bedtime  dextrose 10% Bolus 125 milliLiter(s) IV Bolus once, Stop order after: 1 Doses  dextrose 5%. 1000 milliLiter(s) (100 mL/Hr) IV Continuous <Continuous>  dextrose 5%. 1000 milliLiter(s) (50 mL/Hr) IV Continuous <Continuous>  heparin   Injectable 5000 Unit(s) SubCutaneous every 8 hours  sodium bicarbonate 650 milliGRAM(s) Oral every 8 hours  sodium chloride 0.9% lock flush 3 milliLiter(s) IV Push every 8 hours      LABS:	 	                            10.4   7.36  )-----------( 225      ( 17 Apr 2024 05:16 )             32.3     04-17    138  |  101  |  52.1<H>  ----------------------------<  161<H>  4.2   |  23.0  |  1.65<H>    Ca    8.6      17 Apr 2024 05:16    TPro  5.8<L>  /  Alb  3.6  /  TBili  0.3<L>  /  DBili  x   /  AST  13  /  ALT  11  /  AlkPhos  67  04-17      Lipid Profile:   HgA1c:   TSH:     TELEMETRY: NSR/ST  ECG:    DIAGNOSTIC TESTING:  [ ] Echocardiogram:   < from: TTE W or WO Ultrasound Enhancing Agent (04.14.24 @ 14:18) >     Pt. Name:       JERMAN MCKEON Study Date:    4/14/2024  MRN:            [id number]        YOB: 1940  Accession #:    [id number]       Age:           83 years  Account#:       [id number]      Gender:        M  Visit ID#  Heart Rate:                     Height:        72.00 in (182.88 cm)  Rhythm:     Weight:        160.00 lb (72.58 kg)  Blood Pressure: 129/75 mmHg     BSA/BMI:       1.94 m² / 21.70 kg/m²  ________________________________________________________________________________________  Referring Physician:    [id number] Karen Moreno  Interpreting Physician: Marcos Zuleta MD  Primary Sonographer:    Michi Angel Jr    CPT:               ECHO TTE WO CON COMP W DOPP - 70383.m  Indication(s):     Edema, unspecified - R60.9  Procedure:         Transthoracic echocardiogram with 2-D, M-mode and complete                     spectral and color flow Doppler.  Ordering Location: [facility]  Admission Status:  Inpatient    _______________________________________________________________________________________     CONCLUSIONS:      1. Left ventricular systolic function is normal with an ejection fraction visually estimated at 55 to 60 %.   2. There is increased LV mass and eccentric hypertrophy.   3. Normal right ventricular cavity size, with normal wall thickness, and normal systolic function.   4. Low flow, low gradient aortic stenosis with preserved EF. Left ventricular stroke volume is 47.6 ml ;left ventricular stroke volume index is 24.6 ml/m².   5. No prior echocardiogram is available for comparison.    ____________________________________________________________________  Recommendations:    < end of copied text >  [ ]  Catheterization:  [ ] Stress Test:    OTHER: 	                                                                [facility]                                                         CARDIOLOGY AT [facility]                                                                  [address], [place] [address]                                                         Telephone: [phone]. Fax:[phone]                                                                             PROGRESS NOTE    Reason for follow up: Severe AS   Update: pending esophagram today, AMARIS tomorrow. discussed with patient and family. Elevated calcium score, will also need [facility]      Review of symptoms:   Cardiac:  No chest pain. No dyspnea. No palpitations.  Respiratory: no cough. No dyspnea  Gastrointestinal: No diarrhea. No abdominal pain. No bleeding.   Neuro: No focal neuro complaints.    Vitals:  T(C): 36.6 (04-17-24 @ 08:19), Max: 36.7 (04-16-24 @ 15:43)  HR: 100 (04-17-24 @ 08:19) (88 - 109)  BP: 121/71 (04-17-24 @ 08:19) (92/56 - 125/73)  RR: 18 (04-17-24 @ 08:19) (16 - 18)  SpO2: 93% (04-17-24 @ 08:19) (93% - 99%)  Wt(kg): --  I&O's Summary    16 Apr 2024 07:01  -  17 Apr 2024 07:00  --------------------------------------------------------  IN: 960 mL / OUT: 0 mL / NET: 960 mL      Weight (kg): 72.6 (04-13 @ 17:33)    PHYSICAL EXAM:  Appearance: Comfortable. No acute distress  HEENT:  Atraumatic. Normocephalic.  Normal oral mucosa  Neurologic: A & O x 3, no gross focal deficits.  Cardiovascular: RRR S1 S2,+ murmur, no rubs/gallops. No JVD  Respiratory: Lungs clear to auscultation, unlabored   Gastrointestinal:  Soft, Non-tender, + BS  Lower Extremities: 2+ Peripheral Pulses, No clubbing, cyanosis, or edema  Psychiatry: Patient is calm. No agitation.   Skin: warm and dry.    CURRENT CARDIAC MEDICATIONS:  amLODIPine   Tablet 5 milliGRAM(s) Oral daily      CURRENT OTHER MEDICATIONS:  albuterol/ipratropium for Nebulization 3 milliLiter(s) Nebulizer every 6 hours  acetaminophen     Tablet .. 650 milliGRAM(s) Oral every 6 hours PRN Temp greater or equal to 38C (100.4F), Mild Pain (1 - 3)  melatonin 3 milliGRAM(s) Oral at bedtime PRN Insomnia  ondansetron Injectable 4 milliGRAM(s) IV Push every 8 hours PRN Nausea and/or Vomiting  QUEtiapine 12.5 milliGRAM(s) Oral at bedtime  aluminum hydroxide/magnesium hydroxide/simethicone Suspension 30 milliLiter(s) Oral every 4 hours PRN Dyspepsia  allopurinol 100 milliGRAM(s) Oral daily  dextrose 50% Injectable 12.5 Gram(s) IV Push once, Stop order after: 1 Doses  dextrose 50% Injectable 25 Gram(s) IV Push once, Stop order after: 1 Doses  dextrose Oral Gel 15 Gram(s) Oral once, Stop order after: 1 Doses PRN Blood Glucose LESS THAN 70 milliGRAM(s)/deciliter  glucagon  Injectable 1 milliGRAM(s) IntraMuscular once, Stop order after: 1 Doses  insulin lispro (ADMELOG) corrective regimen sliding scale   SubCutaneous Before meals and at bedtime  simvastatin 40 milliGRAM(s) Oral at bedtime  dextrose 10% Bolus 125 milliLiter(s) IV Bolus once, Stop order after: 1 Doses  dextrose 5%. 1000 milliLiter(s) (100 mL/Hr) IV Continuous <Continuous>  dextrose 5%. 1000 milliLiter(s) (50 mL/Hr) IV Continuous <Continuous>  heparin   Injectable 5000 Unit(s) SubCutaneous every 8 hours  sodium bicarbonate 650 milliGRAM(s) Oral every 8 hours  sodium chloride 0.9% lock flush 3 milliLiter(s) IV Push every 8 hours      LABS:	 	                            10.4   7.36  )-----------( 225      ( 17 Apr 2024 05:16 )             32.3     04-17    138  |  101  |  52.1<H>  ----------------------------<  161<H>  4.2   |  23.0  |  1.65<H>    Ca    8.6      17 Apr 2024 05:16    TPro  5.8<L>  /  Alb  3.6  /  TBili  0.3<L>  /  DBili  x   /  AST  13  /  ALT  11  /  AlkPhos  67  04-17      Lipid Profile:   HgA1c:   TSH:     TELEMETRY: NSR/ST  ECG:    DIAGNOSTIC TESTING:  [ ] Echocardiogram:   < from: TTE W or WO Ultrasound Enhancing Agent (04.14.24 @ 14:18) >     Pt. Name:       JERMAN MCKEON Study Date:    4/14/2024  MRN:            [id number]        YOB: 1940  Accession #:    [id number]       Age:           83 years  Account#:       [id number]      Gender:        M  Visit ID#  Heart Rate:                     Height:        72.00 in (182.88 cm)  Rhythm:     Weight:        160.00 lb (72.58 kg)  Blood Pressure: 129/75 mmHg     BSA/BMI:       1.94 m² / 21.70 kg/m²  ________________________________________________________________________________________  Referring Physician:    [id number] Karen Moreno  Interpreting Physician: Marcos Zuleta MD  Primary Sonographer:    Michi Angel Jr    CPT:               ECHO TTE WO CON COMP W DOPP - 47167.m  Indication(s):     Edema, unspecified - R60.9  Procedure:         Transthoracic echocardiogram with 2-D, M-mode and complete                     spectral and color flow Doppler.  Ordering Location: [facility]  Admission Status:  Inpatient    _______________________________________________________________________________________     CONCLUSIONS:      1. Left ventricular systolic function is normal with an ejection fraction visually estimated at 55 to 60 %.   2. There is increased LV mass and eccentric hypertrophy.   3. Normal right ventricular cavity size, with normal wall thickness, and normal systolic function.   4. Low flow, low gradient aortic stenosis with preserved EF. Left ventricular stroke volume is 47.6 ml ;left ventricular stroke volume index is 24.6 ml/m².   5. No prior echocardiogram is available for comparison.    ____________________________________________________________________  Recommendations:    < end of copied text >  [ ]  Catheterization:  [ ] Stress Test:    OTHER: 	                                                                [facility]                                                         CARDIOLOGY AT [facility]                                                                  [address], [place] [address]                                                         Telephone: [phone]. Fax:[phone]                                                                             PROGRESS NOTE    Reason for follow up: Severe AS   Update: pending esophagram today to decide if can get AMARIS. discussed with patient and family. Elevated calcium score, will also need [facility]      Review of symptoms:   Cardiac:  No chest pain. No dyspnea. No palpitations.  Respiratory: no cough. No dyspnea  Gastrointestinal: No diarrhea. No abdominal pain. No bleeding.   Neuro: No focal neuro complaints.    Vitals:  T(C): 36.6 (04-17-24 @ 08:19), Max: 36.7 (04-16-24 @ 15:43)  HR: 100 (04-17-24 @ 08:19) (88 - 109)  BP: 121/71 (04-17-24 @ 08:19) (92/56 - 125/73)  RR: 18 (04-17-24 @ 08:19) (16 - 18)  SpO2: 93% (04-17-24 @ 08:19) (93% - 99%)  Wt(kg): --  I&O's Summary    16 Apr 2024 07:01  -  17 Apr 2024 07:00  --------------------------------------------------------  IN: 960 mL / OUT: 0 mL / NET: 960 mL      Weight (kg): 72.6 (04-13 @ 17:33)    PHYSICAL EXAM:  Appearance: Comfortable. No acute distress  HEENT:  Atraumatic. Normocephalic.  Normal oral mucosa  Neurologic: A & O x 3, no gross focal deficits.  Cardiovascular: RRR S1 S2,+ murmur, no rubs/gallops. No JVD  Respiratory: Lungs clear to auscultation, unlabored   Gastrointestinal:  Soft, Non-tender, + BS  Lower Extremities: 2+ Peripheral Pulses, No clubbing, cyanosis, or edema  Psychiatry: Patient is calm. No agitation.   Skin: warm and dry.    CURRENT CARDIAC MEDICATIONS:  amLODIPine   Tablet 5 milliGRAM(s) Oral daily      CURRENT OTHER MEDICATIONS:  albuterol/ipratropium for Nebulization 3 milliLiter(s) Nebulizer every 6 hours  acetaminophen     Tablet .. 650 milliGRAM(s) Oral every 6 hours PRN Temp greater or equal to 38C (100.4F), Mild Pain (1 - 3)  melatonin 3 milliGRAM(s) Oral at bedtime PRN Insomnia  ondansetron Injectable 4 milliGRAM(s) IV Push every 8 hours PRN Nausea and/or Vomiting  QUEtiapine 12.5 milliGRAM(s) Oral at bedtime  aluminum hydroxide/magnesium hydroxide/simethicone Suspension 30 milliLiter(s) Oral every 4 hours PRN Dyspepsia  allopurinol 100 milliGRAM(s) Oral daily  dextrose 50% Injectable 12.5 Gram(s) IV Push once, Stop order after: 1 Doses  dextrose 50% Injectable 25 Gram(s) IV Push once, Stop order after: 1 Doses  dextrose Oral Gel 15 Gram(s) Oral once, Stop order after: 1 Doses PRN Blood Glucose LESS THAN 70 milliGRAM(s)/deciliter  glucagon  Injectable 1 milliGRAM(s) IntraMuscular once, Stop order after: 1 Doses  insulin lispro (ADMELOG) corrective regimen sliding scale   SubCutaneous Before meals and at bedtime  simvastatin 40 milliGRAM(s) Oral at bedtime  dextrose 10% Bolus 125 milliLiter(s) IV Bolus once, Stop order after: 1 Doses  dextrose 5%. 1000 milliLiter(s) (100 mL/Hr) IV Continuous <Continuous>  dextrose 5%. 1000 milliLiter(s) (50 mL/Hr) IV Continuous <Continuous>  heparin   Injectable 5000 Unit(s) SubCutaneous every 8 hours  sodium bicarbonate 650 milliGRAM(s) Oral every 8 hours  sodium chloride 0.9% lock flush 3 milliLiter(s) IV Push every 8 hours      LABS:	 	                            10.4   7.36  )-----------( 225      ( 17 Apr 2024 05:16 )             32.3     04-17    138  |  101  |  52.1<H>  ----------------------------<  161<H>  4.2   |  23.0  |  1.65<H>    Ca    8.6      17 Apr 2024 05:16    TPro  5.8<L>  /  Alb  3.6  /  TBili  0.3<L>  /  DBili  x   /  AST  13  /  ALT  11  /  AlkPhos  67  04-17      Lipid Profile:   HgA1c:   TSH:     TELEMETRY: NSR/ST  ECG:    DIAGNOSTIC TESTING:  [ ] Echocardiogram:   < from: TTE W or WO Ultrasound Enhancing Agent (04.14.24 @ 14:18) >     Pt. Name:       JERMAN MCKEON Study Date:    4/14/2024  MRN:            [id number]        YOB: 1940  Accession #:    [id number]       Age:           83 years  Account#:       [id number]      Gender:        M  Visit ID#  Heart Rate:                     Height:        72.00 in (182.88 cm)  Rhythm:     Weight:        160.00 lb (72.58 kg)  Blood Pressure: 129/75 mmHg     BSA/BMI:       1.94 m² / 21.70 kg/m²  ________________________________________________________________________________________  Referring Physician:    [id number] Karen Moreno  Interpreting Physician: Marcos Zuleta MD  Primary Sonographer:    Michi Angel Jr    CPT:               ECHO TTE WO CON COMP W DOPP - 14741.m  Indication(s):     Edema, unspecified - R60.9  Procedure:         Transthoracic echocardiogram with 2-D, M-mode and complete                     spectral and color flow Doppler.  Ordering Location: [facility]  Admission Status:  Inpatient    _______________________________________________________________________________________     CONCLUSIONS:      1. Left ventricular systolic function is normal with an ejection fraction visually estimated at 55 to 60 %.   2. There is increased LV mass and eccentric hypertrophy.   3. Normal right ventricular cavity size, with normal wall thickness, and normal systolic function.   4. Low flow, low gradient aortic stenosis with preserved EF. Left ventricular stroke volume is 47.6 ml ;left ventricular stroke volume index is 24.6 ml/m².   5. No prior echocardiogram is available for comparison.    ____________________________________________________________________  Recommendations:    < end of copied text >  [ ]  Catheterization:  [ ] Stress Test:    OTHER:

## 2024-04-18 LAB
ALBUMIN SERPL ELPH-MCNC: 3.5 G/DL — SIGNIFICANT CHANGE UP (ref 3.3–5.2)
ALP SERPL-CCNC: 65 U/L — SIGNIFICANT CHANGE UP (ref 40–120)
ALT FLD-CCNC: 10 U/L — SIGNIFICANT CHANGE UP
ANION GAP SERPL CALC-SCNC: 10 MMOL/L — SIGNIFICANT CHANGE UP (ref 5–17)
AST SERPL-CCNC: 13 U/L — SIGNIFICANT CHANGE UP
BILIRUB SERPL-MCNC: 0.4 MG/DL — SIGNIFICANT CHANGE UP (ref 0.4–2)
BUN SERPL-MCNC: 37.3 MG/DL — HIGH (ref 8–20)
CALCIUM SERPL-MCNC: 8.4 MG/DL — SIGNIFICANT CHANGE UP (ref 8.4–10.5)
CHLORIDE SERPL-SCNC: 105 MMOL/L — SIGNIFICANT CHANGE UP (ref 96–108)
CHOLEST SERPL-MCNC: 124 MG/DL — SIGNIFICANT CHANGE UP
CO2 SERPL-SCNC: 24 MMOL/L — SIGNIFICANT CHANGE UP (ref 22–29)
CREAT SERPL-MCNC: 1.17 MG/DL — SIGNIFICANT CHANGE UP (ref 0.5–1.3)
EGFR: 61 ML/MIN/1.73M2 — SIGNIFICANT CHANGE UP
GLUCOSE BLDC GLUCOMTR-MCNC: 174 MG/DL — HIGH (ref 70–99)
GLUCOSE BLDC GLUCOMTR-MCNC: 222 MG/DL — HIGH (ref 70–99)
GLUCOSE BLDC GLUCOMTR-MCNC: 279 MG/DL — HIGH (ref 70–99)
GLUCOSE SERPL-MCNC: 208 MG/DL — HIGH (ref 70–99)
HCT VFR BLD CALC: 31.2 % — LOW (ref 39–50)
HDLC SERPL-MCNC: 52 MG/DL — SIGNIFICANT CHANGE UP
HGB BLD-MCNC: 9.9 G/DL — LOW (ref 13–17)
LIPID PNL WITH DIRECT LDL SERPL: 55 MG/DL — SIGNIFICANT CHANGE UP
MCHC RBC-ENTMCNC: 28.8 PG — SIGNIFICANT CHANGE UP (ref 27–34)
MCHC RBC-ENTMCNC: 31.7 GM/DL — LOW (ref 32–36)
MCV RBC AUTO: 90.7 FL — SIGNIFICANT CHANGE UP (ref 80–100)
MRSA PCR RESULT.: SIGNIFICANT CHANGE UP
NON HDL CHOLESTEROL: 72 MG/DL — SIGNIFICANT CHANGE UP
PLATELET # BLD AUTO: 238 K/UL — SIGNIFICANT CHANGE UP (ref 150–400)
POTASSIUM SERPL-MCNC: 4.6 MMOL/L — SIGNIFICANT CHANGE UP (ref 3.5–5.3)
POTASSIUM SERPL-SCNC: 4.6 MMOL/L — SIGNIFICANT CHANGE UP (ref 3.5–5.3)
PREALB SERPL-MCNC: 17 MG/DL — LOW (ref 18–38)
PROT SERPL-MCNC: 5.7 G/DL — LOW (ref 6.6–8.7)
RBC # BLD: 3.44 M/UL — LOW (ref 4.2–5.8)
RBC # FLD: 14.8 % — HIGH (ref 10.3–14.5)
S AUREUS DNA NOSE QL NAA+PROBE: SIGNIFICANT CHANGE UP
SODIUM SERPL-SCNC: 139 MMOL/L — SIGNIFICANT CHANGE UP (ref 135–145)
TRIGL SERPL-MCNC: 86 MG/DL — SIGNIFICANT CHANGE UP
TSH SERPL-MCNC: 2.32 UIU/ML — SIGNIFICANT CHANGE UP (ref 0.27–4.2)
WBC # BLD: 6.52 K/UL — SIGNIFICANT CHANGE UP (ref 3.8–10.5)
WBC # FLD AUTO: 6.52 K/UL — SIGNIFICANT CHANGE UP (ref 3.8–10.5)

## 2024-04-18 PROCEDURE — 99233 SBSQ HOSP IP/OBS HIGH 50: CPT

## 2024-04-18 PROCEDURE — 99152 MOD SED SAME PHYS/QHP 5/>YRS: CPT

## 2024-04-18 PROCEDURE — 93456 R HRT CORONARY ARTERY ANGIO: CPT | Mod: 26

## 2024-04-18 PROCEDURE — 93880 EXTRACRANIAL BILAT STUDY: CPT | Mod: 26

## 2024-04-18 PROCEDURE — 99232 SBSQ HOSP IP/OBS MODERATE 35: CPT | Mod: 25

## 2024-04-18 RX ORDER — ASPIRIN/CALCIUM CARB/MAGNESIUM 324 MG
81 TABLET ORAL ONCE
Refills: 0 | Status: COMPLETED | OUTPATIENT
Start: 2024-04-18 | End: 2024-04-18

## 2024-04-18 RX ADMIN — Medication 4: at 08:20

## 2024-04-18 RX ADMIN — Medication 650 MILLIGRAM(S): at 21:25

## 2024-04-18 RX ADMIN — Medication 81 MILLIGRAM(S): at 13:55

## 2024-04-18 RX ADMIN — QUETIAPINE FUMARATE 12.5 MILLIGRAM(S): 200 TABLET, FILM COATED ORAL at 21:25

## 2024-04-18 RX ADMIN — Medication 3 MILLILITER(S): at 20:18

## 2024-04-18 RX ADMIN — SODIUM CHLORIDE 3 MILLILITER(S): 9 INJECTION INTRAMUSCULAR; INTRAVENOUS; SUBCUTANEOUS at 21:36

## 2024-04-18 RX ADMIN — Medication 6: at 17:07

## 2024-04-18 RX ADMIN — Medication 650 MILLIGRAM(S): at 05:26

## 2024-04-18 RX ADMIN — Medication 3 MILLILITER(S): at 04:47

## 2024-04-18 RX ADMIN — Medication 100 MILLIGRAM(S): at 17:07

## 2024-04-18 RX ADMIN — SIMVASTATIN 40 MILLIGRAM(S): 20 TABLET, FILM COATED ORAL at 21:25

## 2024-04-18 RX ADMIN — Medication 2: at 21:26

## 2024-04-18 RX ADMIN — AMLODIPINE BESYLATE 2.5 MILLIGRAM(S): 2.5 TABLET ORAL at 21:25

## 2024-04-18 RX ADMIN — SODIUM CHLORIDE 60 MILLILITER(S): 9 INJECTION, SOLUTION INTRAVENOUS at 21:26

## 2024-04-18 RX ADMIN — Medication 3 MILLILITER(S): at 09:43

## 2024-04-18 RX ADMIN — SODIUM CHLORIDE 3 MILLILITER(S): 9 INJECTION INTRAMUSCULAR; INTRAVENOUS; SUBCUTANEOUS at 16:49

## 2024-04-18 RX ADMIN — SODIUM CHLORIDE 3 MILLILITER(S): 9 INJECTION INTRAMUSCULAR; INTRAVENOUS; SUBCUTANEOUS at 06:00

## 2024-04-18 RX ADMIN — HEPARIN SODIUM 5000 UNIT(S): 5000 INJECTION INTRAVENOUS; SUBCUTANEOUS at 23:21

## 2024-04-18 NOTE — PROGRESS NOTE ADULT - ASSESSMENT
84 y/o M with a PMHx of HTN, HLD, DMII, and gout who presented to [facility] from and urgent care due to worsening shortness of breath and hypoxia. Patient states that for the last few weeks he has been experiencing worsening dyspnea on exertion, cough, and orthopnea. Patient states that he went to his PMD who gave him flonase and zyrtec, but his symptoms continued to worsen. Patient states yesterday he went to urgent care and was noted to be hypoxic, so he was sent to [facility] for further management. Patient noted to have worsening leg swelling as well, hypoxia, and CXR with pulmonary vascular congestion. Patient denies any cardiac workup in the past. Patient denies any fevers, chills, syncope, near syncope, chest pain, abdominal pain, N/V/D, headache, or dizziness.   pBNP 61

## 2024-04-18 NOTE — PROGRESS NOTE ADULT - SUBJECTIVE AND OBJECTIVE BOX
JERMAN MCKEON Patient is a 83y old  Male who presents with a chief complaint of New onset CHF  Possible PNA (18 Apr 2024 14:24)     HPI:  82 y/o male sent in from urgent care for hypoxia after was recently seen by PMD for c/o cough/SOB. No recent travel, sick contacts or changes in medications. No hx of PNA/aspiration and no reports of coughing with eating. Patient hx of sig for DM-2, HTN, HLD, Gout. Patient able to do most of his ADLs without assistive devices, no reports of falls. He was put on Flonase and Zyrtec by his PMD before going to  today. In the ED noted to by hypoxic to the high 80s at rest. No fever, chills, N/V or CP. Also admits to intermittent LE edema, and orthopnea. He was given steroids/nebs in ED with no improvement. CXR with PVC, and small right pleural effusion. No leucocytosis or shift. Flu/Covid/RSV neg. Patient also noted to have harsh ANUJ with no hx of murmur reported by family. Denies any other complaints at this time.  (14 Apr 2024 00:45)    The patient was seen and evaluated conversing pleasant waiting cath  The patient is in no acute distress.  Denied any fever, abdominal pain, fever, dysuria,        I&O's Summary    17 Apr 2024 07:01  -  18 Apr 2024 07:00  --------------------------------------------------------  IN: 1078 mL / OUT: 0 mL / NET: 1078 mL      Allergies    No Known Allergies    Intolerances      HEALTH ISSUES - PROBLEM Dx:  Acute CHF    Hypertension    Hyperlipidemia    Aortic stenosis    Severe aortic stenosis    CAD (coronary artery disease)    Severe aortic stenosis    Acute on chronic diastolic congestive heart failure    ANDREW (acute kidney injury)          PAST MEDICAL & SURGICAL HISTORY:  HTN (hypertension)      HLD (hyperlipidemia)      Gout      Diabetes      No significant past surgical history              Vital Signs Last 24 Hrs  T(C): 37.1 (18 Apr 2024 13:15), Max: 37.1 (18 Apr 2024 13:15)  T(F): 98.8 (18 Apr 2024 13:15), Max: 98.8 (18 Apr 2024 13:15)  HR: 88 (18 Apr 2024 15:45) (87 - 100)  BP: 123/66 (18 Apr 2024 15:45) (103/63 - 162/84)  BP(mean): 82 (17 Apr 2024 16:52) (82 - 82)  RR: 17 (18 Apr 2024 15:45) (17 - 20)  SpO2: 100% (18 Apr 2024 15:45) (92% - 100%)    Parameters below as of 18 Apr 2024 15:45  Patient On (Oxygen Delivery Method): room air    T(C): 37.1 (04-18-24 @ 13:15), Max: 37.1 (04-18-24 @ 13:15)  HR: 88 (04-18-24 @ 15:45) (87 - 100)  BP: 123/66 (04-18-24 @ 15:45) (103/63 - 162/84)  RR: 17 (04-18-24 @ 15:45) (17 - 20)  SpO2: 100% (04-18-24 @ 15:45) (92% - 100%)  Wt(kg): --    PHYSICAL EXAM:    GENERAL: NAD  HEAD:  Atraumatic, Normocephalic  EYES: EOMI, PERRL, conjunctiva and sclera clear  ENMT:  Moist mucous membranes,   NERVOUS SYSTEM:  Alert & Oriented X3,  Moves upper and lower extremities; CNS-II-XII  CHEST/LUNG: Clear to auscultation bilaterally; No rales, rhonchi, wheezing,   HEART: Regular rate and rhythm; No murmurs,   ABDOMEN: Soft, Nontender, Nondistended; Bowel sounds present  EXTREMITIES:  Peripheral Pulses,   psychiatry- mood and affect appropriate, Insight and judgement intact     acetaminophen     Tablet .. 650 milliGRAM(s) Oral every 6 hours PRN  albuterol/ipratropium for Nebulization 3 milliLiter(s) Nebulizer every 6 hours  allopurinol 100 milliGRAM(s) Oral daily  aluminum hydroxide/magnesium hydroxide/simethicone Suspension 30 milliLiter(s) Oral every 4 hours PRN  amLODIPine   Tablet 2.5 milliGRAM(s) Oral at bedtime  dextrose 10% Bolus 125 milliLiter(s) IV Bolus once  dextrose 5% + sodium chloride 0.9%. 1000 milliLiter(s) IV Continuous <Continuous>  dextrose 5%. 1000 milliLiter(s) IV Continuous <Continuous>  dextrose 5%. 1000 milliLiter(s) IV Continuous <Continuous>  dextrose 50% Injectable 25 Gram(s) IV Push once  dextrose 50% Injectable 12.5 Gram(s) IV Push once  dextrose Oral Gel 15 Gram(s) Oral once PRN  glucagon  Injectable 1 milliGRAM(s) IntraMuscular once  heparin   Injectable 5000 Unit(s) SubCutaneous every 8 hours  insulin lispro (ADMELOG) corrective regimen sliding scale   SubCutaneous Before meals and at bedtime  melatonin 3 milliGRAM(s) Oral at bedtime PRN  ondansetron Injectable 4 milliGRAM(s) IV Push every 8 hours PRN  QUEtiapine 12.5 milliGRAM(s) Oral at bedtime  simvastatin 40 milliGRAM(s) Oral at bedtime  sodium bicarbonate 650 milliGRAM(s) Oral every 8 hours  sodium chloride 0.9% lock flush 3 milliLiter(s) IV Push every 8 hours      LABS:                          9.9    6.52  )-----------( 238      ( 18 Apr 2024 05:01 )             31.2     04-18    139  |  105  |  37.3<H>  ----------------------------<  208<H>  4.6   |  24.0  |  1.17    Ca    8.4      18 Apr 2024 05:01    TPro  5.7<L>  /  Alb  3.5  /  TBili  0.4  /  DBili  x   /  AST  13  /  ALT  10  /  AlkPhos  65  04-18    LIVER FUNCTIONS - ( 18 Apr 2024 05:01 )  Alb: 3.5 g/dL / Pro: 5.7 g/dL / ALK PHOS: 65 U/L / ALT: 10 U/L / AST: 13 U/L / GGT: x                 Urinalysis Basic - ( 18 Apr 2024 05:01 )    Color: x / Appearance: x / SG: x / pH: x  Gluc: 208 mg/dL / Ketone: x  / Bili: x / Urobili: x   Blood: x / Protein: x / Nitrite: x   Leuk Esterase: x / RBC: x / WBC x   Sq Epi: x / Non Sq Epi: x / Bacteria: x      CAPILLARY BLOOD GLUCOSE      POCT Blood Glucose.: 222 mg/dL (18 Apr 2024 08:17)  POCT Blood Glucose.: 161 mg/dL (17 Apr 2024 22:00)  POCT Blood Glucose.: 348 mg/dL (17 Apr 2024 17:11)      RADIOLOGY & ADDITIONAL TESTS:      Consultant notes reviewed    Case discussed with consultant/provider/ family /patient

## 2024-04-18 NOTE — PROGRESS NOTE ADULT - SUBJECTIVE AND OBJECTIVE BOX
[facility]                                                         CARDIOLOGY AT [facility]                                                                  [address], [place] [address]                                                         Telephone: [phone]. Fax:[phone]                                                                             PROGRESS NOTE    Reason for follow up: Severe AS  Update: plan for RHC/LHC today , TAVR workup per CTsx      Review of symptoms:   Cardiac:  No chest pain. No dyspnea. No palpitations.  Respiratory: no cough. No dyspnea  Gastrointestinal: No diarrhea. No abdominal pain. No bleeding.   Neuro: No focal neuro complaints.    Vitals:  T(C): 36.6 (04-18-24 @ 05:38), Max: 36.8 (04-17-24 @ 16:52)  HR: 100 (04-18-24 @ 05:38) (87 - 102)  BP: 103/63 (04-18-24 @ 05:38) (103/63 - 150/72)  RR: 20 (04-18-24 @ 05:38) (18 - 20)  SpO2: 97% (04-18-24 @ 05:38) (92% - 97%)  Wt(kg): --  I&O's Summary    17 Apr 2024 07:01  -  18 Apr 2024 07:00  --------------------------------------------------------  IN: 1078 mL / OUT: 0 mL / NET: 1078 mL      Weight (kg): 72.6 (04-13 @ 17:33)    PHYSICAL EXAM:  Appearance: Comfortable. No acute distress  HEENT:  Atraumatic. Normocephalic.  Normal oral mucosa [place]  Neurologic: A & O x 3, no gross focal deficits.  Cardiovascular: RRR S1 S2, + murmur, no rubs/gallops. No JVD  Respiratory: Lungs clear to auscultation, unlabored   Gastrointestinal:  Soft, Non-tender, + BS  Lower Extremities: 2+ Peripheral Pulses, No clubbing, cyanosis, or edema  Psychiatry: Patient is calm. No agitation.   Skin: warm and dry.    CURRENT CARDIAC MEDICATIONS:  amLODIPine   Tablet 2.5 milliGRAM(s) Oral at bedtime      CURRENT OTHER MEDICATIONS:  albuterol/ipratropium for Nebulization 3 milliLiter(s) Nebulizer every 6 hours  acetaminophen     Tablet .. 650 milliGRAM(s) Oral every 6 hours PRN Temp greater or equal to 38C (100.4F), Mild Pain (1 - 3)  melatonin 3 milliGRAM(s) Oral at bedtime PRN Insomnia  ondansetron Injectable 4 milliGRAM(s) IV Push every 8 hours PRN Nausea and/or Vomiting  QUEtiapine 12.5 milliGRAM(s) Oral at bedtime  aluminum hydroxide/magnesium hydroxide/simethicone Suspension 30 milliLiter(s) Oral every 4 hours PRN Dyspepsia  allopurinol 100 milliGRAM(s) Oral daily  dextrose 50% Injectable 12.5 Gram(s) IV Push once, Stop order after: 1 Doses  dextrose 50% Injectable 25 Gram(s) IV Push once, Stop order after: 1 Doses  dextrose Oral Gel 15 Gram(s) Oral once, Stop order after: 1 Doses PRN Blood Glucose LESS THAN 70 milliGRAM(s)/deciliter  glucagon  Injectable 1 milliGRAM(s) IntraMuscular once, Stop order after: 1 Doses  insulin lispro (ADMELOG) corrective regimen sliding scale   SubCutaneous Before meals and at bedtime  simvastatin 40 milliGRAM(s) Oral at bedtime  dextrose 10% Bolus 125 milliLiter(s) IV Bolus once, Stop order after: 1 Doses  dextrose 5% + sodium chloride 0.9%. 1000 milliLiter(s) (60 mL/Hr) IV Continuous <Continuous>  dextrose 5%. 1000 milliLiter(s) (100 mL/Hr) IV Continuous <Continuous>  dextrose 5%. 1000 milliLiter(s) (50 mL/Hr) IV Continuous <Continuous>  heparin   Injectable 5000 Unit(s) SubCutaneous every 8 hours  sodium bicarbonate 650 milliGRAM(s) Oral every 8 hours  sodium chloride 0.9% lock flush 3 milliLiter(s) IV Push every 8 hours      LABS:	 	                            9.9    6.52  )-----------( 238      ( 18 Apr 2024 05:01 )             31.2     04-18    139  |  105  |  37.3<H>  ----------------------------<  208<H>  4.6   |  24.0  |  1.17    Ca    8.4      18 Apr 2024 05:01    TPro  5.7<L>  /  Alb  3.5  /  TBili  0.4  /  DBili  x   /  AST  13  /  ALT  10  /  AlkPhos  65  04-18      Lipid Profile: Date: 04-18 @ 05:01  Total cholesterol 124; Direct LDL: --; HDL: 52; Triglycerides:86    HgA1c:   TSH: Thyroid Stimulating Hormone, Serum: 2.32 uIU/mL      TELEMETRY: ST low 100s  ECG:    DIAGNOSTIC TESTING:  [ ] Echocardiogram: < from: TTE W or WO Ultrasound Enhancing Agent (04.14.24 @ 14:18) >  TRANSTHORACIC ECHOCARDIOGRAM REPORT  ________________________________________________________________________________                                      _______       Pt. Name:       JERMAN MCKEON Study Date:    4/14/2024  MRN:            [id number]        YOB: 1940  Accession #:    [id number]       Age:           83 years  Account#:       [id number]      Gender:        M  Visit ID#  Heart Rate:                     Height:        72.00 in (182.88 cm)  Rhythm:     Weight:        160.00 lb (72.58 kg)  Blood Pressure: 129/75 mmHg     BSA/BMI:       1.94 m² / 21.70 kg/m²  ________________________________________________________________________________________  Referring Physician:    [id number] Karen Moreno  Interpreting Physician: Marcos Zuleta MD  Primary Sonographer:    Michi Angel Jr    CPT:               ECHO TTE WO CON COMP W DOPP - 73334.m  Indication(s):     Edema, unspecified - R60.9  Procedure:         Transthoracic echocardiogram with 2-D, M-mode and complete                     spectral and color flow Doppler.  Ordering Location: [facility]  Admission Status:  Inpatient    _______________________________________________________________________________________     CONCLUSIONS:      1. Left ventricular systolic function is normal with an ejection fraction visually estimated at 55 to 60 %.   2. There is increased LV mass and eccentric hypertrophy.   3. Normal right ventricular cavity size, with normal wall thickness, and normal systolic function.   4. Low flow, low gradient aortic stenosis with preserved EF. Left ventricular stroke volume is 47.6 ml ;left ventricular stroke volume index is 24.6 ml/m².   5. No prior echocardiogram is available for comparison.    < end of copied text >    [ ]  Catheterization:  [ ] Stress Test:    OTHER:

## 2024-04-18 NOTE — PROGRESS NOTE ADULT - ASSESSMENT
82 y/o male with new hypoxia, orthopnea, LE edema, ANUJ, Hx of HTN, HLD, DM-2, Gout    Dysphagia esophogram most consistent with cricopharyngeal bar/prominent   cricopharyngeus muscle with a short segment narrowing to a luminal   diameter of 5 mm.Mild distal tertiary contractions      New onset CHF, Severe AS- pending AMARIS to assess aortic valve   - TTE reviewed  LHC and RHC today  - US duplex LE negative for DVT  -Cont. ACE, hold BB pending 2D echo  -Daily weights, daily BMP/lytes  -Cardiology recs- appreciated- AMARIS - CTS c/s placed  -OOB, ambulate, fall risk    -DVT-P w/ vc boots sub Q heparin    ANDREW with AGMA improving renal indices- will monitor post contrast for cath   sodium bicarb 650 every 8 hours   -Avoid hypotension     HTN:  -Cont. O/P regimen  -DASH diet    HLD:  -Statin    DM-2:  -ADA diet, cont meds     Gout:  -Cont. Allopurinol    84 y/o male with new hypoxia, orthopnea, LE edema, ANUJ, Hx of HTN, HLD, DM-2, Gout    Dysphagia esophogram most consistent with cricopharyngeal bar/prominent   cricopharyngeus muscle with a short segment narrowing to a luminal   diameter of 5 mm.Mild distal tertiary contractions- discussed with Dr Shima LOFTON for further recommendations - cont soft bite size diet for now      New onset CHF, Severe AS- pending AMARIS to assess aortic valve   - TTE reviewed  LHC and RHC today  - US duplex LE negative for DVT  -Cont. ACE, hold BB pending 2D echo  -Daily weights, daily BMP/lytes  -Cardiology recs- appreciated- AMARIS - CTS c/s placed  -OOB, ambulate, fall risk    -DVT-P w/ vc boots sub Q heparin    ANDREW with AGMA improving renal indices- will monitor post contrast for cath   sodium bicarb 650 every 8 hours   -Avoid hypotension     HTN:  -Cont. O/P regimen  -DASH diet    HLD:  -Statin    DM-2:  -ADA diet, cont meds     Gout:  -Cont. Allopurinol

## 2024-04-18 NOTE — PROGRESS NOTE ADULT - SUBJECTIVE AND OBJECTIVE BOX
Hot left thyroid nodule  With normal TSH ENT will monitor   NEPHROLOGY INTERVAL HPI/OVERNIGHT EVENTS:    No new events.    MEDICATIONS  (STANDING):  albuterol/ipratropium for Nebulization 3 milliLiter(s) Nebulizer every 6 hours  allopurinol 100 milliGRAM(s) Oral daily  amLODIPine   Tablet 2.5 milliGRAM(s) Oral at bedtime  dextrose 10% Bolus 125 milliLiter(s) IV Bolus once  dextrose 5% + sodium chloride 0.9%. 1000 milliLiter(s) (60 mL/Hr) IV Continuous <Continuous>  dextrose 5%. 1000 milliLiter(s) (100 mL/Hr) IV Continuous <Continuous>  dextrose 5%. 1000 milliLiter(s) (50 mL/Hr) IV Continuous <Continuous>  dextrose 50% Injectable 25 Gram(s) IV Push once  dextrose 50% Injectable 12.5 Gram(s) IV Push once  glucagon  Injectable 1 milliGRAM(s) IntraMuscular once  heparin   Injectable 5000 Unit(s) SubCutaneous every 8 hours  insulin lispro (ADMELOG) corrective regimen sliding scale   SubCutaneous Before meals and at bedtime  QUEtiapine 12.5 milliGRAM(s) Oral at bedtime  simvastatin 40 milliGRAM(s) Oral at bedtime  sodium bicarbonate 650 milliGRAM(s) Oral every 8 hours  sodium chloride 0.9% lock flush 3 milliLiter(s) IV Push every 8 hours    MEDICATIONS  (PRN):  acetaminophen     Tablet .. 650 milliGRAM(s) Oral every 6 hours PRN Temp greater or equal to 38C (100.4F), Mild Pain (1 - 3)  aluminum hydroxide/magnesium hydroxide/simethicone Suspension 30 milliLiter(s) Oral every 4 hours PRN Dyspepsia  dextrose Oral Gel 15 Gram(s) Oral once PRN Blood Glucose LESS THAN 70 milliGRAM(s)/deciliter  melatonin 3 milliGRAM(s) Oral at bedtime PRN Insomnia  ondansetron Injectable 4 milliGRAM(s) IV Push every 8 hours PRN Nausea and/or Vomiting      Allergies    No Known Allergies          Vital Signs Last 24 Hrs  T(C): 37 (2024 09:29), Max: 37 (2024 09:29)  T(F): 98.6 (2024 09:29), Max: 98.6 (2024 09:29)  HR: 100 (2024 09:29) (87 - 102)  BP: 152/76 (2024 09:29) (103/63 - 152/76)  BP(mean): 82 (2024 16:52) (82 - 82)  RR: 18 (2024 09:29) (18 - 20)  SpO2: 98% (2024 09:29) (92% - 98%)    Parameters below as of 2024 09:29  Patient On (Oxygen Delivery Method): nasal cannula  O2 Flow (L/min): 1         Daily Weight in k.3 (2024 05:38)    PHYSICAL EXAM:    GENERAL: oob in chair, family  at bedside.  HEAD:  wnl  EYES:   ENMT:   NECK: veins  flat  NERVOUS SYSTEM:  alert, oriented, decreased   hearing  chronic  CHEST/LUNG: no 02, occasional rhonchi  HEART: murmur  same  ABDOMEN: nt  EXTREMITIES:  no edema  LYMPH:   SKIN: pale  : Neg    LABS:                        9.9    6.52  )-----------( 238      ( 2024 05:01 )             31.2     04-18    139  |  105  |  37.3<H>  ----------------------------<  208<H>  4.6   |  24.0  |  1.17    Ca    8.4      2024 05:01    TPro  5.7<L>  /  Alb  3.5  /  TBili  0.4  /  DBili  x   /  AST  13  /  ALT  10  /  AlkPhos  65  04-18      Urinalysis Basic - ( 2024 05:01 )    Color: x / Appearance: x / SG: x / pH: x  Gluc: 208 mg/dL / Ketone: x  / Bili: x / Urobili: x   Blood: x / Protein: x / Nitrite: x   Leuk Esterase: x / RBC: x / WBC x   Sq Epi: x / Non Sq Epi: x / Bacteria: x              RADIOLOGY & ADDITIONAL TESTS:

## 2024-04-18 NOTE — PROGRESS NOTE ADULT - PROBLEM SELECTOR PLAN 2
.  - CT calcium score with severe calcium  - will also need R/LHC tomorrow  - keep NPO after midnight .  - CT calcium score with severe calcium  - will also need R/LHC today  - keep NPO after midnight

## 2024-04-18 NOTE — PROGRESS NOTE ADULT - SUBJECTIVE AND OBJECTIVE BOX
Interventional Cardiology NP Precath Note      HPI: 82 y/o M with a PMHx of HTN, HLD, DMII, and gout who presented to [facility] from and urgent care due to worsening shortness of breath and hypoxia. Patient states that for the last few weeks he has been experiencing worsening dyspnea on exertion, cough, and orthopnea. Patient states that he went to his PMD who gave him flonase and zyrtec, but his symptoms continued to worsen. Patient states yesterday he went to urgent care and was noted to be hypoxic, so he was sent to [facility] for further management. Patient noted to have worsening leg swelling as well, hypoxia, and CXR with pulmonary vascular congestion. Patient denies any cardiac workup in the past. Patient denies any fevers, chills, syncope, near syncope, chest pain, abdominal pain, N/V/D, headache, or dizziness.           ECHO: < from: TTE W or WO Ultrasound Enhancing Agent (04.14.24 @ 14:18) >  CONCLUSIONS:      1. Left ventricular systolic function is normal with an ejection fraction visually estimated at 55 to 60 %.   2. There is increased LV mass and eccentric hypertrophy.   3. Normal right ventricular cavity size, with normal wall thickness, and normal systolic function.   4. Low flow, low gradient aortic stenosis with preserved EF. Left ventricular stroke volume is 47.6 ml ;left ventricular stroke volume index is 24.6 ml/m².   5. No prior echocardiogram is available for comparison.    < end of copied text >          ALL: NKDA, NKFA. Denies shellfish/Contrast dye allergy.  PAST MEDICAL & SURGICAL HISTORY:  HTN (hypertension)      HLD (hyperlipidemia)      Gout      Diabetes      No significant past surgical history        SocHX: Denies EtoH/TOB/IVDU    MEDS:   acetaminophen     Tablet .. 650 milliGRAM(s) Oral every 6 hours PRN  albuterol/ipratropium for Nebulization 3 milliLiter(s) Nebulizer every 6 hours  allopurinol 100 milliGRAM(s) Oral daily  aluminum hydroxide/magnesium hydroxide/simethicone Suspension 30 milliLiter(s) Oral every 4 hours PRN  amLODIPine   Tablet 2.5 milliGRAM(s) Oral at bedtime  dextrose 10% Bolus 125 milliLiter(s) IV Bolus once  dextrose 5% + sodium chloride 0.9%. 1000 milliLiter(s) IV Continuous <Continuous>  dextrose 5%. 1000 milliLiter(s) IV Continuous <Continuous>  dextrose 5%. 1000 milliLiter(s) IV Continuous <Continuous>  dextrose 50% Injectable 25 Gram(s) IV Push once  dextrose 50% Injectable 12.5 Gram(s) IV Push once  dextrose Oral Gel 15 Gram(s) Oral once PRN  glucagon  Injectable 1 milliGRAM(s) IntraMuscular once  heparin   Injectable 5000 Unit(s) SubCutaneous every 8 hours  insulin lispro (ADMELOG) corrective regimen sliding scale   SubCutaneous Before meals and at bedtime  melatonin 3 milliGRAM(s) Oral at bedtime PRN  ondansetron Injectable 4 milliGRAM(s) IV Push every 8 hours PRN  QUEtiapine 12.5 milliGRAM(s) Oral at bedtime  simvastatin 40 milliGRAM(s) Oral at bedtime  sodium bicarbonate 650 milliGRAM(s) Oral every 8 hours  sodium chloride 0.9% lock flush 3 milliLiter(s) IV Push every 8 hours    T(C): 37.1 (04-18-24 @ 13:15), Max: 37.1 (04-18-24 @ 13:15)  HR: 99 (04-18-24 @ 13:15) (87 - 102)  BP: 162/84 (04-18-24 @ 13:15) (103/63 - 162/84)  RR: 17 (04-18-24 @ 13:15) (17 - 20)  SpO2: 100% (04-18-24 @ 13:15) (92% - 100%)  Wt(kg): --                              9.9    6.52  )-----------( 238      ( 18 Apr 2024 05:01 )             31.2     04-18    139  |  105  |  37.3<H>  ----------------------------<  208<H>  4.6   |  24.0  |  1.17    Ca    8.4      18 Apr 2024 05:01    TPro  5.7<L>  /  Alb  3.5  /  TBili  0.4  /  DBili  x   /  AST  13  /  ALT  10  /  AlkPhos  65  04-18        					  ASA __3___				Mallampati class: _______2__	            BRA : _____4.5%____  A/P:  82 y/o M with a PMHx of HTN, HLD, DMII, and gout who presented to [facility] from and urgent care due to worsening shortness of breath and hypoxia. Patient states that for the last few weeks he has been experiencing worsening dyspnea on exertion, cough, and orthopnea. Patient states that he went to his PMD who gave him flonase and zyrtec, but his symptoms continued to worsen. Patient states yesterday he went to urgent care and was noted to be hypoxic, so he was sent to [facility] for further management. Patient noted to have worsening leg swelling as well, hypoxia, and CXR with pulmonary vascular congestion. Patient denies any cardiac workup in the past. Patient denies any fevers, chills, syncope, near syncope, chest pain, abdominal pain, N/V/D, headache, or dizziness. For R/LHC  -Keep NPO  -Proceed with procedure as planned  -Asa 81mg po x1   -DNR/DNI rescinded for procedure  -IC obtained consent        Risks & benefits of procedure and sedation and risks and benefits for the alternative therapy have been explained to the patient in layman’s terms including but not limited to: allergic reaction, bleeding, infection, arrhythmia, respiratory compromise, renal and vascular compromise, limb damage, MI, CVA, emergent CABG/Vascular Surgery and death. Informed consent obtained and in chart.

## 2024-04-19 DIAGNOSIS — R13.10 DYSPHAGIA, UNSPECIFIED: ICD-10-CM

## 2024-04-19 LAB
ALBUMIN SERPL ELPH-MCNC: 3.8 G/DL — SIGNIFICANT CHANGE UP (ref 3.3–5.2)
ALP SERPL-CCNC: 76 U/L — SIGNIFICANT CHANGE UP (ref 40–120)
ALT FLD-CCNC: 13 U/L — SIGNIFICANT CHANGE UP
ANION GAP SERPL CALC-SCNC: 14 MMOL/L — SIGNIFICANT CHANGE UP (ref 5–17)
AST SERPL-CCNC: 21 U/L — SIGNIFICANT CHANGE UP
BILIRUB SERPL-MCNC: 0.4 MG/DL — SIGNIFICANT CHANGE UP (ref 0.4–2)
BUN SERPL-MCNC: 15.6 MG/DL — SIGNIFICANT CHANGE UP (ref 8–20)
CALCIUM SERPL-MCNC: 8.7 MG/DL — SIGNIFICANT CHANGE UP (ref 8.4–10.5)
CHLORIDE SERPL-SCNC: 102 MMOL/L — SIGNIFICANT CHANGE UP (ref 96–108)
CO2 SERPL-SCNC: 22 MMOL/L — SIGNIFICANT CHANGE UP (ref 22–29)
CREAT SERPL-MCNC: 0.87 MG/DL — SIGNIFICANT CHANGE UP (ref 0.5–1.3)
EGFR: 85 ML/MIN/1.73M2 — SIGNIFICANT CHANGE UP
GLUCOSE BLDC GLUCOMTR-MCNC: 139 MG/DL — HIGH (ref 70–99)
GLUCOSE BLDC GLUCOMTR-MCNC: 203 MG/DL — HIGH (ref 70–99)
GLUCOSE BLDC GLUCOMTR-MCNC: 231 MG/DL — HIGH (ref 70–99)
GLUCOSE BLDC GLUCOMTR-MCNC: 328 MG/DL — HIGH (ref 70–99)
GLUCOSE SERPL-MCNC: 202 MG/DL — HIGH (ref 70–99)
HCT VFR BLD CALC: 35 % — LOW (ref 39–50)
HGB BLD-MCNC: 11.3 G/DL — LOW (ref 13–17)
MCHC RBC-ENTMCNC: 28.8 PG — SIGNIFICANT CHANGE UP (ref 27–34)
MCHC RBC-ENTMCNC: 32.3 GM/DL — SIGNIFICANT CHANGE UP (ref 32–36)
MCV RBC AUTO: 89.3 FL — SIGNIFICANT CHANGE UP (ref 80–100)
PLATELET # BLD AUTO: 227 K/UL — SIGNIFICANT CHANGE UP (ref 150–400)
POTASSIUM SERPL-MCNC: 4.6 MMOL/L — SIGNIFICANT CHANGE UP (ref 3.5–5.3)
POTASSIUM SERPL-SCNC: 4.6 MMOL/L — SIGNIFICANT CHANGE UP (ref 3.5–5.3)
PROT SERPL-MCNC: 6.6 G/DL — SIGNIFICANT CHANGE UP (ref 6.6–8.7)
RBC # BLD: 3.92 M/UL — LOW (ref 4.2–5.8)
RBC # FLD: 14.7 % — HIGH (ref 10.3–14.5)
SODIUM SERPL-SCNC: 138 MMOL/L — SIGNIFICANT CHANGE UP (ref 135–145)
WBC # BLD: 6.43 K/UL — SIGNIFICANT CHANGE UP (ref 3.8–10.5)
WBC # FLD AUTO: 6.43 K/UL — SIGNIFICANT CHANGE UP (ref 3.8–10.5)

## 2024-04-19 PROCEDURE — 74174 CTA ABD&PLVS W/CONTRAST: CPT | Mod: 26

## 2024-04-19 PROCEDURE — 75574 CT ANGIO HRT W/3D IMAGE: CPT | Mod: 26

## 2024-04-19 PROCEDURE — 71275 CT ANGIOGRAPHY CHEST: CPT | Mod: 26

## 2024-04-19 PROCEDURE — ZZZZZ: CPT

## 2024-04-19 PROCEDURE — 99231 SBSQ HOSP IP/OBS SF/LOW 25: CPT

## 2024-04-19 PROCEDURE — 99232 SBSQ HOSP IP/OBS MODERATE 35: CPT

## 2024-04-19 PROCEDURE — 99232 SBSQ HOSP IP/OBS MODERATE 35: CPT | Mod: 25

## 2024-04-19 RX ORDER — INSULIN GLARGINE 100 [IU]/ML
10 INJECTION, SOLUTION SUBCUTANEOUS AT BEDTIME
Refills: 0 | Status: DISCONTINUED | OUTPATIENT
Start: 2024-04-19 | End: 2024-04-20

## 2024-04-19 RX ADMIN — SODIUM CHLORIDE 3 MILLILITER(S): 9 INJECTION INTRAMUSCULAR; INTRAVENOUS; SUBCUTANEOUS at 05:34

## 2024-04-19 RX ADMIN — INSULIN GLARGINE 10 UNIT(S): 100 INJECTION, SOLUTION SUBCUTANEOUS at 22:05

## 2024-04-19 RX ADMIN — Medication 650 MILLIGRAM(S): at 22:04

## 2024-04-19 RX ADMIN — Medication 8: at 13:31

## 2024-04-19 RX ADMIN — Medication 3 MILLILITER(S): at 22:01

## 2024-04-19 RX ADMIN — SODIUM CHLORIDE 3 MILLILITER(S): 9 INJECTION INTRAMUSCULAR; INTRAVENOUS; SUBCUTANEOUS at 22:06

## 2024-04-19 RX ADMIN — Medication 3 MILLILITER(S): at 09:08

## 2024-04-19 RX ADMIN — Medication 4: at 17:33

## 2024-04-19 RX ADMIN — Medication 650 MILLIGRAM(S): at 05:27

## 2024-04-19 RX ADMIN — SODIUM CHLORIDE 3 MILLILITER(S): 9 INJECTION INTRAMUSCULAR; INTRAVENOUS; SUBCUTANEOUS at 13:22

## 2024-04-19 RX ADMIN — HEPARIN SODIUM 5000 UNIT(S): 5000 INJECTION INTRAVENOUS; SUBCUTANEOUS at 05:27

## 2024-04-19 RX ADMIN — Medication 4: at 09:23

## 2024-04-19 RX ADMIN — QUETIAPINE FUMARATE 12.5 MILLIGRAM(S): 200 TABLET, FILM COATED ORAL at 22:03

## 2024-04-19 RX ADMIN — Medication 100 MILLIGRAM(S): at 13:30

## 2024-04-19 RX ADMIN — Medication 650 MILLIGRAM(S): at 13:30

## 2024-04-19 RX ADMIN — HEPARIN SODIUM 5000 UNIT(S): 5000 INJECTION INTRAVENOUS; SUBCUTANEOUS at 22:04

## 2024-04-19 RX ADMIN — Medication 3 MILLILITER(S): at 03:48

## 2024-04-19 RX ADMIN — AMLODIPINE BESYLATE 2.5 MILLIGRAM(S): 2.5 TABLET ORAL at 22:04

## 2024-04-19 RX ADMIN — SIMVASTATIN 40 MILLIGRAM(S): 20 TABLET, FILM COATED ORAL at 22:04

## 2024-04-19 NOTE — CONSULT NOTE ADULT - REASON FOR ADMISSION
New onset CHF  Possible PNA

## 2024-04-19 NOTE — CHART NOTE - NSCHARTNOTEFT_GEN_A_CORE
GI consult requested to evaluate "esophageal integrity" per speech recommendations  Diet also recommended per speech  This is an 83 year old male with decompensated CHF. No plans for endoscopic intervention at this time  Consider non invasive means of evaluation ie esophagram   Assist with meals and maintain aspiration precautions  Call GI if the esophagram is abnormal
83y Male who had a R/LHC no intervention, via RRA and right brachial vein. Patient awake and alert without complaints. Denies chest pain, sob, palps.    Neuro: A&OX3  Lungs: CTA B/L  CV: S1, S2, no murmur, RRR  Abd: Soft  Right Wrist and brachial site no bleeding, no hematoma, no ecchymosis  Extremity: + distal pulses, cap refill <3sec      A/P: 83y Male s/p R/LHC no intervention  1. Wrist management discussed with patient  2. Continue current meds  3. Bedrest x 1 hour  4. Remove brachial sheath now and radial band in 1 hour  5. Plan as per CTS
83y Male who had a RHC/LHC with no intervention. Right radial site benign with no hematoma or ecchymosis noted. +2 radial pulse. Right brachial site benign with no hematoma or ecchymosis noted. Patient awake and alert without complaints overnight. OOB and ambulating.    CM: SR  Neuro: A&OX3, CN 2-12 intact  HEENT: NC, AT  Lungs: CTA B/L  CV: S1, S2, no murmur, RRR  Abd: Soft  Right Wrist no bleeding, no hematoma, no ecchymosis  Right Brachial: no hematoma, no bleeding, no ecchymosis   Extremity: + distal pulses      A/P: 83y Male s/p LHC no intervention  1. Groin/wrist management discussed with patient
AMARIS canceled. Patient reports recent swallowing difficulty. Will plan for swallow eval and if cleared today, plan to keep NPO after midnight for AMARIS tomorrow

## 2024-04-19 NOTE — CONSULT NOTE ADULT - PROBLEM SELECTOR RECOMMENDATION 9
- Imaging reviewed with ENT attending Dr. Ronquillo, patient would benefit from esophageal dilation however this is nonurgent. He is currently tolerating a diet and explained that he would need to have general anesthesia for this procedure. Patient not interested in this time as he does not feel it impacts his daily life.   - Aspiration precautions, diet per SLP  - If dysphagia worsens or patient would like to pursue dilation, can follow up with Dr. Ronquillo outpatient once cardiac workup/TAVR completed  - ENT will now sign off, please call [phone] for further questions
- Acutely decompensated CHF, unknown EF.   - Patient also with systolic ejection murmur on exam, concerning for AS.   - Obtain TTE.   - Continue lasix 20mg IV BID at this time, will increase as needed.   - Continue lisinopril 20mg PO qday.   - Will add further GDMT based on echocardiogram results.   - Monitor on telemetry.    - Strict i/o and daily weights.    - Keep K > 4, Mg > 2.    - Monitor renal function with ongoing diuresis.
Noted on TTE,TTE with EF 55-60%, increased LV mass with eccentric hypertrophy, Low flow, low gradient aortic stenosis with preserved EF  AMARIS cancelled due to dysphagia concerns  s/p Xray esophagram with 5mm luminal narrowing at C5 level  Discussed with structural heart, can proceed with TAVR eval (feasible to offer light sedation with TTE guided TF-TAVR if deemed TAVR candidate)  CAD noted on CT calcium score, pending [facility] as renal function tolerates  Will need eventual TAVR CTA  Preop work up ordered including carotid US to assess for carotid stenosis, PFTs to eval lung function, TTE to eval EF / WMA / Valve function, and lab work including TSH, prealbumin, Hgb A1C, P2Y12, BNP, T&S, MRSA/MSSA, and UA.     Patient amenable to rescind code status for surgery  To be discussed with Dr. Guillen

## 2024-04-19 NOTE — PROGRESS NOTE ADULT - ASSESSMENT
83M PMHx of HTN, HLD, DMII, and gout who presented to [facility] from and urgent care due to worsening shortness of breath admitted with acute hypoxic respiratory failure in setting of acute (likey on chronic) diastolic HF, requiring diuresis, found to have severe AS, new ANDREW, LHC no signifcant CAD (? official report pending), CT surgery consulted for TAVR consult.

## 2024-04-19 NOTE — PROGRESS NOTE ADULT - SUBJECTIVE AND OBJECTIVE BOX
[facility]                                                         CARDIOLOGY AT [facility]                                                                  [address] [address]                                                         Telephone: [phone]. Fax:[phone]                                                                             PROGRESS NOTE    Reason for follow up: post cath   Overall Plan: needs CCTA TAVR workup     Review of symptoms:   Cardiac:  No chest pain. No dyspnea. No palpitations.  Respiratory: no cough. No dyspnea  Gastrointestinal: No diarrhea. No abdominal pain. No bleeding.   Neuro: No focal neuro complaints.    Vitals:  T(C): 36.3 (04-19-24 @ 08:56), Max: 37.1 (04-18-24 @ 13:15)  HR: 89 (04-19-24 @ 09:24) (82 - 99)  BP: 149/68 (04-19-24 @ 08:56) (123/66 - 162/84)  RR: 20 (04-19-24 @ 08:56) (17 - 20)  SpO2: 96% (04-19-24 @ 09:24) (95% - 100%)  Wt(kg): --  I&O's Summary    18 Apr 2024 07:01  -  19 Apr 2024 07:00  --------------------------------------------------------  IN: 1260 mL / OUT: 0 mL / NET: 1260 mL      PHYSICAL EXAM:  Appearance: Comfortable. No acute distress  HEENT:  Atraumatic. Normocephalic.  Normal oral mucosa  Neurologic: A & O x 3, no gross focal deficits.  Cardiovascular: RRR S1 S2, No murmur, no rubs/gallops. No JVD  Respiratory: Lungs clear to auscultation, unlabored   Gastrointestinal:  Soft, Non-tender, + BS  Lower Extremities: 2+ Peripheral Pulses, No clubbing, cyanosis, or edema  Psychiatry: Patient is calm. No agitation.   Skin: warm and dry.    CURRENT CARDIAC MEDICATIONS:  amLODIPine   Tablet 2.5 milliGRAM(s) Oral at bedtime    CURRENT OTHER MEDICATIONS:  albuterol/ipratropium for Nebulization 3 milliLiter(s) Nebulizer every 6 hours  acetaminophen     Tablet .. 650 milliGRAM(s) Oral every 6 hours PRN Temp greater or equal to 38C (100.4F), Mild Pain (1 - 3)  melatonin 3 milliGRAM(s) Oral at bedtime PRN Insomnia  ondansetron Injectable 4 milliGRAM(s) IV Push every 8 hours PRN Nausea and/or Vomiting  QUEtiapine 12.5 milliGRAM(s) Oral at bedtime  aluminum hydroxide/magnesium hydroxide/simethicone Suspension 30 milliLiter(s) Oral every 4 hours PRN Dyspepsia  allopurinol 100 milliGRAM(s) Oral daily  dextrose 50% Injectable 25 Gram(s) IV Push once, Stop order after: 1 Doses  dextrose 50% Injectable 12.5 Gram(s) IV Push once, Stop order after: 1 Doses  dextrose Oral Gel 15 Gram(s) Oral once, Stop order after: 1 Doses PRN Blood Glucose LESS THAN 70 milliGRAM(s)/deciliter  glucagon  Injectable 1 milliGRAM(s) IntraMuscular once, Stop order after: 1 Doses  insulin lispro (ADMELOG) corrective regimen sliding scale   SubCutaneous Before meals and at bedtime  simvastatin 40 milliGRAM(s) Oral at bedtime  dextrose 10% Bolus 125 milliLiter(s) IV Bolus once, Stop order after: 1 Doses  dextrose 5%. 1000 milliLiter(s) (100 mL/Hr) IV Continuous <Continuous>  dextrose 5%. 1000 milliLiter(s) (50 mL/Hr) IV Continuous <Continuous>  heparin   Injectable 5000 Unit(s) SubCutaneous every 8 hours  sodium bicarbonate 650 milliGRAM(s) Oral every 8 hours  sodium chloride 0.9% lock flush 3 milliLiter(s) IV Push every 8 hours    LABS:	 	               11.3   6.43  )-----------( 227      ( 19 Apr 2024 08:31 )             35.0     04-19    138  |  102  |  15.6  ----------------------------<  202<H>  4.6   |  22.0  |  0.87    Ca    8.7      19 Apr 2024 08:31    TPro  6.6  /  Alb  3.8  /  TBili  0.4  /  DBili  x   /  AST  21  /  ALT  13  /  AlkPhos  76  04-19      Lipid Profile: Date: 04-18 @ 05:01  Total cholesterol 124; Direct LDL: --; HDL: 52; Triglycerides:86    HgA1c:   TSH: Thyroid Stimulating Hormone, Serum: 2.32 uIU/mL

## 2024-04-19 NOTE — PROGRESS NOTE ADULT - SUBJECTIVE AND OBJECTIVE BOX
Patient seen and examined    Feels fine  no c/o CP SOB NV   No swelling feet    Vital Signs Last 24 Hrs  T(C): 36.3 (19 Apr 2024 08:56), Max: 37.1 (18 Apr 2024 13:15)  T(F): 97.3 (19 Apr 2024 08:56), Max: 98.8 (18 Apr 2024 13:15)  HR: 89 (19 Apr 2024 09:24) (82 - 99)  BP: 149/68 (19 Apr 2024 08:56) (123/66 - 162/84)  BP(mean): 92 (18 Apr 2024 21:01) (92 - 92)  RR: 20 (19 Apr 2024 08:56) (17 - 20)  SpO2: 96% (19 Apr 2024 09:24) (95% - 100%)    Parameters below as of 19 Apr 2024 09:24  Patient On (Oxygen Delivery Method): nasal cannula        PHYSICAL EXAM    GENERAL: NAD,   EYES:  conjunctiva and sclera clear  NECK: Supple, No JVD/Bruit  NERVOUS SYSTEM:  A/O x3,   CHEST:  No rales, No rhonchi  HEART:  RRR, No murmur  ABDOMEN: Soft, NT/ND BS+  EXTREMITIES:  No Edema;  SKIN: No rashes    19 Apr 2024 08:31    138    |  102    |  15.6   ----------------------------<  202    4.6     |  22.0   |  0.87     Ca    8.7        19 Apr 2024 08:31    TPro  6.6    /  Alb  3.8    /  TBili  0.4    /  DBili  x      /  AST  21     /  ALT  13     /  AlkPhos  76     19 Apr 2024 08:31                          11.3   6.43  )-----------( 227      ( 19 Apr 2024 08:31 )             35.0         Continue present treatment

## 2024-04-19 NOTE — CONSULT NOTE ADULT - ASSESSMENT
82 y/o male sent in from urgent care for hypoxia after was recently seen by PMD for c/o cough/SOB. No recent travel, sick contacts or changes in medications. No hx of PNA/aspiration and no reports of coughing with eating. Patient hx of sig for DM-2, HTN, HLD, Gout. Patient able to do most of his ADLs without assistive devices, no reports of falls.. In the ED noted to by hypoxic to the high 80s at rest w SOB. No fever, chills, N/V or CP. Also admits to intermittent LE edema, and orthopnea. He was given steroids/nebs in ED with no improvement. CXR with PVC, and small right pleural effusion. Serum Cr noted to be elevated to 1.7    ANDREW NonOliguric Serum Cr 0.9--> 1.2--> 1.7  No h/o underlying renal dz previous serum cr nL  does have h/o DM / HTN  CXR noted w PVC  Given diuretics w improvement in dyspnea lasix 40 mg iV x1  Will hold Lisinopril 20 mg Qday  Check renal Us     Anemia will check iron studies    MA will add po NaBicarb    Renally dose meds  Avoid nephrotoxic agents  Monitor labs will follow
83M presenting with dysphagia, esophagram significant for cricopharyngeal bar. 
83y Male with PMHx of HTN, HLD, DMII, and gout who presented to [facility] from and urgent care due to worsening shortness of breath and hypoxia. Patient states that for the last few weeks he has been experiencing worsening dyspnea on exertion, cough, and orthopnea. Patient states that he went to his PMD who gave him flonase and zyrtec, but his symptoms continued to worsen. Patient states yesterday he went to urgent care and was noted to be hypoxic, so he was sent to [facility] for further management. Patient noted to have worsening leg swelling as well, hypoxia, and CXR with pulmonary vascular congestion. Work up notable for Acute HFpEF, severe AS, severe calcium score on CT. Was diureses with improvement in symptoms. Now with new ANDREW. Pending C. CTS consulted for TAVR eval.
A/P: Patient is an 82 y/o M with a PMHx of HTN, HLD, DMII, and gout who presented to [facility] from and urgent care due to worsening shortness of breath and hypoxia. Patient states that for the last few weeks he has been experiencing worsening dyspnea on exertion, cough, and orthopnea. Patient states that he went to his PMD who gave him flonase and zyrtec, but his symptoms continued to worsen. Patient states yesterday he went to urgent care and was noted to be hypoxic, so he was sent to [facility] for further management. Patient noted to have worsening leg swelling as well, hypoxia, and CXR with pulmonary vascular congestion. Patient denies any cardiac workup in the past. Patient denies any fevers, chills, syncope, near syncope, chest pain, abdominal pain, N/V/D, headache, or dizziness.   pBNP 616

## 2024-04-19 NOTE — PROGRESS NOTE ADULT - TIME BILLING
Reviewing charts and coordinating care with Cardio, CTS and Nephrology.
Time spent reviewing the chart documentation, reviewing labs and imaging studies, evaluating the patient, discussing the plan of care with the consultants & medical team, and documenting.
Acute HFpEF, Severe AS, ANDREW

## 2024-04-19 NOTE — PROGRESS NOTE ADULT - ASSESSMENT
82 y/o male sent in from urgent care for hypoxia after was recently seen by PMD for c/o cough/SOB. No recent travel, sick contacts or changes in medications. No hx of PNA/aspiration and no reports of coughing with eating. Patient hx of sig for DM-2, HTN, HLD, Gout. Patient able to do most of his ADLs without assistive devices, no reports of falls. He was put on Flonase and Zyrtec by his PMD before going to  today. In the ED noted to by hypoxic to the high 80s at rest. No fever, chills, N/V or CP. Also admits to intermittent LE edema, and orthopnea. He was given steroids/nebs in ED with no improvement. CXR with PVC, and small right pleural effusion. No leucocytosis or shift. Flu/Covid/RSV neg.    Acute Diastolic Heart Failure   ECHO reviewed   s/p LHC and RHC on 4/18  s/p Lasix  Cardio follow up noted     Severe AS  TAVR work up in progress   CTS Consult noted     Cricopharyngeal bar/prominent   ENT consult appreciated: no intervention at this time, outpatient follow up     ANDREW   Improved  s/p IVF  Nephro recs noted     HTN:  -Cont. Amlodipine   -DASH diet    HLD:  -Statin    DM-2:  A1c 7.2  -Accu checks and ISS  -Add Lantus 10 units at bedtime     Gout:  -Cont. Allopurinol     DVT Prophylaxis -- Heparin SQ    Dispo: Home likely tomorrow.     Updated family at bedside.  84 y/o male sent in from urgent care for hypoxia after was recently seen by PMD for c/o cough/SOB. No recent travel, sick contacts or changes in medications. No hx of PNA/aspiration and no reports of coughing with eating. Patient hx of sig for DM-2, HTN, HLD, Gout. Patient able to do most of his ADLs without assistive devices, no reports of falls. He was put on Flonase and Zyrtec by his PMD before going to  today. In the ED noted to by hypoxic to the high 80s at rest. No fever, chills, N/V or CP. Also admits to intermittent LE edema, and orthopnea. He was given steroids/nebs in ED with no improvement. CXR with PVC, and small right pleural effusion. No leucocytosis or shift. Flu/Covid/RSV neg.    Acute Diastolic Heart Failure   ECHO reviewed   s/p LHC and RHC on 4/18  s/p Lasix  Cardio follow up noted     Severe AS  TAVR work up in progress   CTS Consult noted     Cricopharyngeal bar   ENT consult appreciated: no intervention at this time, outpatient follow up     ANDREW   Improved  s/p IVF  Nephro recs noted     HTN:  -Cont. Amlodipine   -DASH diet    HLD:  -Statin    DM-2:  A1c 7.2  -Accu checks and ISS  -Add Lantus 10 units at bedtime     Gout:  -Cont. Allopurinol     Hypomagnesemia:  -Repeted     DVT Prophylaxis -- Heparin SQ    Dispo: Home likely tomorrow.     Updated family at bedside.

## 2024-04-19 NOTE — PROGRESS NOTE ADULT - PROBLEM SELECTOR PROBLEM 2
CAD (coronary artery disease)
CAD (coronary artery disease)
Acute on chronic diastolic congestive heart failure

## 2024-04-19 NOTE — PROGRESS NOTE ADULT - SUBJECTIVE AND OBJECTIVE BOX
Subjective: no complaints, reports no SOB, cough improved denies CP, palpitations, fever, chills, itchiness/rash, diaphoresis, vision changes, HA, dizziness/lightheadedness, numbness/tingling, abd pain, N/V     T(C): 36.6 (04-19-24 @ 17:40), Max: 36.7 (04-19-24 @ 05:24)  HR: 102 (04-19-24 @ 17:40) (82 - 102)  BP: 127/68 (04-19-24 @ 17:40) (127/68 - 149/68)  RR: 20 (04-19-24 @ 17:40) (20 - 20)  SpO2: 98% (04-19-24 @ 17:40) (95% - 98%)    04-19    138  |  102  |  15.6  ----------------------------<  202<H>  4.6   |  22.0  |  0.87    Ca    8.7      19 Apr 2024 08:31    TPro  6.6  /  Alb  3.8  /  TBili  0.4  /  DBili  x   /  AST  21  /  ALT  13  /  AlkPhos  76  04-19                               11.3   6.43  )-----------( 227      ( 19 Apr 2024 08:31 )             35.0                    CAPILLARY BLOOD GLUCOSE  POCT Blood Glucose.: 231 mg/dL (19 Apr 2024 17:20)  POCT Blood Glucose.: 328 mg/dL (19 Apr 2024 12:44)  POCT Blood Glucose.: 203 mg/dL (19 Apr 2024 08:25)  POCT Blood Glucose.: 174 mg/dL (18 Apr 2024 21:23)    I&O's Detail    18 Apr 2024 07:01  -  19 Apr 2024 07:00  --------------------------------------------------------  IN:    dextrose 5% + sodium chloride 0.9%: 1260 mL  Total IN: 1260 mL    OUT:  Total OUT: 0 mL  Total NET: 1260 mL    19 Apr 2024 07:01  -  19 Apr 2024 20:32  --------------------------------------------------------  IN:    Oral Fluid: 600 mL  Total IN: 600 mL    OUT:  Total OUT: 0 mL  Total NET: 600 mL    Drug Dosing Weight  Height (cm): 182.9 (16 Apr 2024 12:15)  Weight (kg): 72.6 (13 Apr 2024 17:33)  BMI (kg/m2): 21.7 (16 Apr 2024 12:15)  BSA (m2): 1.94 (16 Apr 2024 12:15)    MEDICATIONS  (STANDING):  albuterol/ipratropium for Nebulization 3 milliLiter(s) Nebulizer every 6 hours  allopurinol 100 milliGRAM(s) Oral daily  amLODIPine   Tablet 2.5 milliGRAM(s) Oral at bedtime  dextrose 10% Bolus 125 milliLiter(s) IV Bolus once  dextrose 5%. 1000 milliLiter(s) (100 mL/Hr) IV Continuous <Continuous>  dextrose 5%. 1000 milliLiter(s) (50 mL/Hr) IV Continuous <Continuous>  dextrose 50% Injectable 25 Gram(s) IV Push once  dextrose 50% Injectable 12.5 Gram(s) IV Push once  glucagon  Injectable 1 milliGRAM(s) IntraMuscular once  heparin   Injectable 5000 Unit(s) SubCutaneous every 8 hours  insulin glargine Injectable (LANTUS) 10 Unit(s) SubCutaneous at bedtime  insulin lispro (ADMELOG) corrective regimen sliding scale   SubCutaneous Before meals and at bedtime  QUEtiapine 12.5 milliGRAM(s) Oral at bedtime  simvastatin 40 milliGRAM(s) Oral at bedtime  sodium bicarbonate 650 milliGRAM(s) Oral every 8 hours  sodium chloride 0.9% lock flush 3 milliLiter(s) IV Push every 8 hours    MEDICATIONS  (PRN):  acetaminophen     Tablet .. 650 milliGRAM(s) Oral every 6 hours PRN Temp greater or equal to 38C (100.4F), Mild Pain (1 - 3)  aluminum hydroxide/magnesium hydroxide/simethicone Suspension 30 milliLiter(s) Oral every 4 hours PRN Dyspepsia  dextrose Oral Gel 15 Gram(s) Oral once PRN Blood Glucose LESS THAN 70 milliGRAM(s)/deciliter  melatonin 3 milliGRAM(s) Oral at bedtime PRN Insomnia  ondansetron Injectable 4 milliGRAM(s) IV Push every 8 hours PRN Nausea and/or Vomiting    Physical Exam  Gen: NAD  Neuro: A&Ox3 non focal speech clear and intact  Pulm: rhonchi b/l bases   CV: S1S2 +ANUJ  Abd: soft NT ND  Extrem/MS: no edema/cyanosis RAO

## 2024-04-19 NOTE — PROGRESS NOTE ADULT - PROBLEM SELECTOR PLAN 1
.  - recently with hypoxia and SOB  - Cr increased, hold diuretics, avoid nephrotoxic agents  - 1.65 today, improving  - has been having difficulty swallowing, pending Esophogram today. If ok, can proceed with AMARIS in AM  - TTE with EF 55-60%, increased LV mass with eccentric hypertrophy, Low flow, low gradient aortic stenosis with preserved EF  - NPO after midnight for AMARIS on 4/18 to assess severity of AS  - CTSx consult if warranted after AMARIS
.  - recently with hypoxia and SOB  - Cr increased, hold diuretics, avoid nephrotoxic agents  - also with cough  - TTE with EF 55-60%, increased LV mass with eccentric hypertrophy, Low flow, low gradient aortic stenosis with preserved EF  - NPO for AMARIS today to assess severity of AS  - CTSx consult if warranted after AMARIS
- pt w/ hypoxia and SOB over last few weeks  - pBNP is 600 which is within realm of normal for a patient of 83 years of age   - There is some LE edema and patient is improved with diuresis, will continue for another 24h and transition to PO in AM   - Echo shows normal EF bu there is LV hypertrophy   - ?severe AS? there is low flow low gradient AS. ELEUTERIO is 0.84   - patient will need AMARIS and CT Calcium Score of aortic valve  - keep NPO @ MN   - patient does not have angina, reduced EF, WMA, ischemia/infarct on EKG. Troponin was never done. There is no indication for ischemic eval as there is no criteria being met for ACS   - Depending on valve severity, may need CTS eval for valve repair/replacement and may eventually need LHC as a part of that workup.
- s/p C confirming severe AS   - plan for outpatient TAVR   - but will need inpatient TAVR CTA before discharge  - renal function is improved today post cath  - check TAVR CTA then discharge home as per CTS   - renal function is normal today   - continue amlodipine for BP  - Patient educated about wrist site care, signs and symptoms of complication post procedure, and plan of care moving forward. Patient verbalized understanding with teach-back.
.  - recently with hypoxia and SOB  - Cr increased, hold diuretics, avoid nephrotoxic agents  - 1.65 -> 1.17  - has been having difficulty swallowing, pending Esophogram today. If ok, can proceed with AMARIS in AM  - TTE with EF 55-60%, increased LV mass with eccentric hypertrophy, Low flow, low gradient aortic stenosis with preserved EF  - NPO for RHC/LHC for further evaluation  - CTSx consult appreciated, TAVR workup in progress
preop work up completed  TAVR CTA ordered by cardiology, completed today, results pending   pt DNR/DNI will need to be rescinded for TAVR (pt/family aware)  per primary team pt to be d/c'd home tomorrow if cr stable    CT surgery office to reach out to pt next week regarding follow up and surgery timing based on TAVR CTA  d/w Dr. Guillen in AM rounds

## 2024-04-19 NOTE — CHART NOTE - NSCHARTNOTESELECT_GEN_ALL_CORE
Cardiology/Event Note
Event Note
Interventional Cardiology Site Check/Event Note
Gastroenterology/Event Note

## 2024-04-19 NOTE — PROGRESS NOTE ADULT - NS ATTEND AMEND GEN_ALL_CORE FT
Patient seen and examined by me.  Patient is being discharged today.
Patient seen and examined by me.
seen with above,    83M h/o HTN, HLD, DM2 gout presents with worsening dyspnea/hypoxia found with acute HFpEF, Echo done with paradoxical LG severe AS, CT-AV calcium score of 2600s, due to dysphagia underwent barium swallow found with constriction at the laryngeal section of the esophagus estimated luminal diameter 5 mm in one plane, unable to have AMARIS (as smallest pediatric probe with diameter of 8 mm), had ANDREW since IV diuresis on hold, appears euvolemic  -CT surgery eval. for TAVR workup, will hydrate IVF 60 cc/hr overnight if Cr. improves then obtain [facility] inpatient, if not then would need outpatient TAVR workup  -updated patient and his daughter/son at the bedside        Denis Redman DO, [facility]  Faculty Non-Invasive Cardiologist  [phone]

## 2024-04-19 NOTE — PROGRESS NOTE ADULT - REASON FOR ADMISSION
New onset CHF  Possible PNA

## 2024-04-19 NOTE — PROGRESS NOTE ADULT - ASSESSMENT
84 y/o M with a PMHx of HTN, HLD, DMII, and gout who presented to [facility] from and urgent care due to worsening shortness of breath and hypoxia. Patient states that for the last few weeks he has been experiencing worsening dyspnea on exertion, cough, and orthopnea. Patient states that he went to his PMD who gave him flonase and zyrtec, but his symptoms continued to worsen. Patient states yesterday he went to urgent care and was noted to be hypoxic, so he was sent to [facility] for further management. Patient noted to have worsening leg swelling as well, hypoxia, and CXR with pulmonary vascular congestion. Patient denies any cardiac workup in the past. Patient denies any fevers, chills, syncope, near syncope, chest pain, abdominal pain, N/V/D, headache, or dizziness.   pBNP 613

## 2024-04-19 NOTE — CONSULT NOTE ADULT - SUBJECTIVE AND OBJECTIVE BOX
[facility]                                              CARDIOLOGY AT [address], [place] [address]                                             Telephone: [phone]. Fax:[phone]                                                       CARDIOLOGY CONSULTATION NOTE                                                                                             History obtained by: Patient and medical record  Community Cardiologist: None   obtained: Yes [  ] No [ X ]  Reason for Consultation: Acute CHF  Available out pt records reviewed: Yes [  ] No [ X ]    Chief complaint:    Patient is a 83y old  Male who presents with a chief complaint of New onset CHF  Possible PNA (14 Apr 2024 00:45)      HPI: Patient is an 84 y/o M with a PMHx of HTN, HLD, DMII, and gout who presented to [facility] from and urgent care due to worsening shortness of breath and hypoxia. Patient states that for the last few weeks he has been experiencing worsening dyspnea on exertion, cough, and orthopnea. Patient states that he went to his PMD who gave him flonase and zyrtec, but his symptoms continued to worsen. Patient states yesterday he went to urgent care and was noted to be hypoxic, so he was sent to [facility] for further management. Patient noted to have worsening leg swelling as well, hypoxia, and CXR with pulmonary vascular congestion. Patient denies any cardiac workup in the past. Patient denies any fevers, chills, syncope, near syncope, chest pain, abdominal pain, N/V/D, headache, or dizziness.       PAST MEDICAL HISTORY  HTN (hypertension)    HLD (hyperlipidemia)    Gout    Diabetes        PAST SURGICAL HISTORY  No significant past surgical history        SOCIAL HISTORY:   CIGARETTES:   Former smoker, quit 60 years ago  ALCOHOL: Denies  DRUGS: Denies    FAMILY HISTORY:  No pertinent family history in first degree relatives      Family History of Cardiovascular Disease:  Yes [  ] No [  ]  Coronary Artery Disease in first degree relative: Yes [  ] No [  ]  Sudden Cardiac Death in First degree relative: Yes [  ] No [  ]    HOME MEDICATIONS:  allopurinol 100 mg oral tablet: orally once a day (14 Apr 2024 00:54)  glimepiride 1 mg oral tablet: 1 tab(s) orally once a day (14 Apr 2024 00:54)  Januvia 100 mg oral tablet: 1 tab(s) orally once a day (14 Apr 2024 00:54)  lisinopril 20 mg oral tablet: 1 tab(s) orally once a day (14 Apr 2024 00:54)  Norvasc 5 mg oral tablet: 1 tab(s) orally once a day (14 Apr 2024 00:54)  Zocor 40 mg oral tablet: 1 tab(s) orally once a day (14 Apr 2024 00:55)      CURRENT CARDIAC MEDICATIONS:  amLODIPine   Tablet 5 milliGRAM(s) Oral daily  furosemide   Injectable 20 milliGRAM(s) IV Push two times a day  lisinopril 20 milliGRAM(s) Oral daily      CURRENT OTHER MEDICATIONS:  albuterol/ipratropium for Nebulization 3 milliLiter(s) Nebulizer every 6 hours PRN Shortness of Breath and/or Wheezing  acetaminophen     Tablet .. 650 milliGRAM(s) Oral every 6 hours PRN Temp greater or equal to 38C (100.4F), Mild Pain (1 - 3)  melatonin 3 milliGRAM(s) Oral at bedtime PRN Insomnia  ondansetron Injectable 4 milliGRAM(s) IV Push every 8 hours PRN Nausea and/or Vomiting  aluminum hydroxide/magnesium hydroxide/simethicone Suspension 30 milliLiter(s) Oral every 4 hours PRN Dyspepsia  allopurinol 100 milliGRAM(s) Oral daily  dextrose 10% Bolus 125 milliLiter(s) IV Bolus once, Stop order after: 1 Doses  dextrose 5%. 1000 milliLiter(s) (50 mL/Hr) IV Continuous <Continuous>  dextrose 5%. 1000 milliLiter(s) (100 mL/Hr) IV Continuous <Continuous>  dextrose 50% Injectable 12.5 Gram(s) IV Push once, Stop order after: 1 Doses  dextrose 50% Injectable 25 Gram(s) IV Push once, Stop order after: 1 Doses  dextrose Oral Gel 15 Gram(s) Oral once, Stop order after: 1 Doses PRN Blood Glucose LESS THAN 70 milliGRAM(s)/deciliter  glucagon  Injectable 1 milliGRAM(s) IntraMuscular once, Stop order after: 1 Doses  insulin lispro (ADMELOG) corrective regimen sliding scale   SubCutaneous Before meals and at bedtime  magnesium sulfate  IVPB 2 Gram(s) IV Intermittent every 2 hours, Stop order after: 3 Doses  simvastatin 40 milliGRAM(s) Oral at bedtime  sodium chloride 0.9% lock flush 3 milliLiter(s) IV Push every 8 hours      ALLERGIES:   No Known Allergies      REVIEW OF SYMPTOMS:   CONSTITUTIONAL: No fever, no chills, no weight loss, no weight gain, no fatigue   ENMT:  No vertigo; No sinus or throat pain  NECK: No pain or stiffness  CARDIOVASCULAR: AS PER HPI  RESPIRATORY: AS PER HPI  : No dysuria, no hematuria   GI: No dark color stool, no nausea, no diarrhea, no constipation, no abdominal pain   NEURO: No headache, no slurred speech   MUSCULOSKELETAL: No joint pain or swelling; No muscle, back, or extremity pain  PSYCH: No agitation, no anxiety.    ALL OTHER REVIEW OF SYSTEMS ARE NEGATIVE.    VITAL SIGNS:  T(C): 36.6 (04-14-24 @ 07:05), Max: 37.3 (04-13-24 @ 17:33)  T(F): 97.8 (04-14-24 @ 07:05), Max: 99.1 (04-13-24 @ 17:33)  HR: 104 (04-14-24 @ 07:05) (78 - 109)  BP: 129/75 (04-14-24 @ 07:05) (117/72 - 179/78)  RR: 18 (04-14-24 @ 07:05) (18 - 20)  SpO2: 95% (04-14-24 @ 07:05) (88% - 98%)    INTAKE AND OUTPUT:       PHYSICAL EXAM:  Constitutional: Comfortable . No acute distress.   HEENT: Atraumatic and normocephalic , neck is supple . no JVD. No carotid bruit.  CNS: A&Ox3. No focal deficits.   Respiratory: Bibasilar rales with wheezing  Cardiovascular: Tachycardic s1 s2.  No rubs or gallop. III/VI systolic murmur lsb  Gastrointestinal: Soft, non-tender. +Bowel sounds.   Extremities: 2+ Peripheral Pulses, No clubbing, cyanosis, 1+ b/l LE edema  Psychiatric: Calm . no agitation.   Skin: Warm and dry, no ulcers on extremities     LABS:                            10.9   6.13  )-----------( 248      ( 14 Apr 2024 03:05 )             34.5     04-14    137  |  103  |  21.3<H>  ----------------------------<  293<H>  4.7   |  19.0<L>  |  0.93    Ca    8.6      14 Apr 2024 03:05  Phos  3.1     04-14  Mg     1.5     04-14    TPro  6.4<L>  /  Alb  3.7  /  TBili  0.2<L>  /  DBili  x   /  AST  29  /  ALT  16  /  AlkPhos  73  04-13      Urinalysis Basic - ( 14 Apr 2024 03:05 )    Color: x / Appearance: x / SG: x / pH: x  Gluc: 293 mg/dL / Ketone: x  / Bili: x / Urobili: x   Blood: x / Protein: x / Nitrite: x   Leuk Esterase: x / RBC: x / WBC x   Sq Epi: x / Non Sq Epi: x / Bacteria: x              INTERPRETATION OF TELEMETRY: ST    ECG: ST, PRWP, LVH, NSST/T wave abnormalities   Prior ECG: Yes [  ] No [  ]    RADIOLOGY & ADDITIONAL STUDIES:    X-ray:      CT scan:   MRI:   US:  
CC: dysphagia    HPI: Patient is an 83M with significant PMH of DM2, HTN, HLD, gout presenting with new onset CHF. In the ED, known to be hypoxic and now undergoing workup for TAVR. 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    According to daughter and patient, he has noticed the dysphagia worsening over the past 5+ years or so but still able to tolerate thin liquids and bite sized food. He does not cough or regurgitate after eating. Prior smoker, quit 60+ years ago. He denies hx of cancer, GERD or odynophagia at this time.       PAST MEDICAL & SURGICAL HISTORY:  HTN (hypertension)      HLD (hyperlipidemia)      Gout      Diabetes      No significant past surgical history        Allergies    No Known Allergies    Intolerances      MEDICATIONS  (STANDING):  albuterol/ipratropium for Nebulization 3 milliLiter(s) Nebulizer every 6 hours  allopurinol 100 milliGRAM(s) Oral daily  amLODIPine   Tablet 2.5 milliGRAM(s) Oral at bedtime  dextrose 10% Bolus 125 milliLiter(s) IV Bolus once  dextrose 5% + sodium chloride 0.9%. 1000 milliLiter(s) (60 mL/Hr) IV Continuous <Continuous>  dextrose 5%. 1000 milliLiter(s) (100 mL/Hr) IV Continuous <Continuous>  dextrose 5%. 1000 milliLiter(s) (50 mL/Hr) IV Continuous <Continuous>  dextrose 50% Injectable 25 Gram(s) IV Push once  dextrose 50% Injectable 12.5 Gram(s) IV Push once  glucagon  Injectable 1 milliGRAM(s) IntraMuscular once  heparin   Injectable 5000 Unit(s) SubCutaneous every 8 hours  insulin lispro (ADMELOG) corrective regimen sliding scale   SubCutaneous Before meals and at bedtime  QUEtiapine 12.5 milliGRAM(s) Oral at bedtime  simvastatin 40 milliGRAM(s) Oral at bedtime  sodium bicarbonate 650 milliGRAM(s) Oral every 8 hours  sodium chloride 0.9% lock flush 3 milliLiter(s) IV Push every 8 hours    MEDICATIONS  (PRN):  acetaminophen     Tablet .. 650 milliGRAM(s) Oral every 6 hours PRN Temp greater or equal to 38C (100.4F), Mild Pain (1 - 3)  aluminum hydroxide/magnesium hydroxide/simethicone Suspension 30 milliLiter(s) Oral every 4 hours PRN Dyspepsia  dextrose Oral Gel 15 Gram(s) Oral once PRN Blood Glucose LESS THAN 70 milliGRAM(s)/deciliter  melatonin 3 milliGRAM(s) Oral at bedtime PRN Insomnia  ondansetron Injectable 4 milliGRAM(s) IV Push every 8 hours PRN Nausea and/or Vomiting      Social History: prior smoker, drinks alcohol ~1/2 beer daily    ROS:   ENT: all negative except as noted in HPI   CV: denies palpitations  Pulm: denies SOB, cough, hemoptysis  GI: denies change in apetite, indigestion, n/v  : denies pertinent urinary symptoms, urgency  Neuro: denies numbness/tingling, loss of sensation  Psych: denies anxiety  MS: denies muscle weakness, instability  Heme: denies easy bruising or bleeding  Endo: denies heat/cold intolerance, excessive sweating  Vascular: denies LE edema    Vital Signs Last 24 Hrs  T(C): 36.7 (19 Apr 2024 05:24), Max: 37.1 (18 Apr 2024 13:15)  T(F): 98.1 (19 Apr 2024 05:24), Max: 98.8 (18 Apr 2024 13:15)  HR: 85 (19 Apr 2024 05:24) (82 - 100)  BP: 132/72 (19 Apr 2024 05:24) (123/66 - 162/84)  BP(mean): 92 (18 Apr 2024 21:01) (92 - 92)  RR: 20 (19 Apr 2024 05:24) (17 - 20)  SpO2: 95% (19 Apr 2024 05:24) (95% - 100%)    Parameters below as of 19 Apr 2024 05:24  Patient On (Oxygen Delivery Method): nasal cannula  O2 Flow (L/min): 1                            9.9    6.52  )-----------( 238      ( 18 Apr 2024 05:01 )             31.2    04-18    139  |  105  |  37.3<H>  ----------------------------<  208<H>  4.6   |  24.0  |  1.17    Ca    8.4      18 Apr 2024 05:01    TPro  5.7<L>  /  Alb  3.5  /  TBili  0.4  /  DBili  x   /  AST  13  /  ALT  10  /  AlkPhos  65  04-18       PHYSICAL EXAM:  Gen: NAD  Skin: No rashes, bruises, or lesions  Head: Normocephalic, Atraumatic  Face: no edema, erythema, or fluctuance. Parotid glands soft without mass  Eyes: no scleral injection  Ears: external exam unremarkable  Nose: Nares bilaterally patent, no discharge  Mouth: No Stridor / Drooling / Trismus.  Mucosa moist, tongue/uvula midline, oropharynx clear  Neck: Flat, supple, no lymphadenopathy, trachea midline, no masses  Lymphatic: No lymphadenopathy  Resp: breathing easily, no stridor  Neuro: facial nerve intact, no facial droop      IMAGING/ADDITIONAL STUDIES:     ACC: [id number] EXAM:  XR ESOPH SNGL CON STUDY   ORDERED BY: ARYAN BETH     PROCEDURE DATE:  04/17/2024          INTERPRETATION:  Clinical history: 83-year-old male, dysphasia.    Esophagram was performed, cine images were obtained. There areno   previous fluoroscopic studies available for comparison, correlation is   made with the chest CT of 4/16/2024.    FINDINGS: Normal transit of contrast through the pharynx, esophagus and   into stomach.    A short segment of narrowing to a luminaldiameter of 5 mm is evident at   the C5 level with marked posterior mass effect. 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    Mild tertiary contractions are evident in the distal esophagus (images   241-285 of series 1).    No Zenker's diverticulum, hiatal hernia or gastroesophageal reflux. No   mucosal abnormality or intraluminal filling defect.    Fluoroscopy time: 1.2 minutes.    IMPRESSION:    Findings most consistent with a cricopharyngeal bar/prominent   cricopharyngeus muscle with a short segment narrowing to a luminal   diameter of 5 mm.    Mild distal tertiary contractions    --- End of Report ---  
  HPI:  82 y/o male sent in from urgent care for hypoxia after was recently seen by PMD for c/o cough/SOB. No recent travel, sick contacts or changes in medications. No hx of PNA/aspiration and no reports of coughing with eating. Patient hx of sig for DM-2, HTN, HLD, Gout. Patient able to do most of his ADLs without assistive devices, no reports of falls. He was put on Flonase and Zyrtec by his PMD before going to  today. In the ED noted to by hypoxic to the high 80s at rest. No fever, chills, N/V or CP. Also admits to intermittent LE edema, and orthopnea. He was given steroids/nebs in ED with no improvement. CXR with PVC, and small right pleural effusion. No leucocytosis or shift. Flu/Covid/RSV neg. Patient also noted to have harsh ANUJ with no hx of murmur reported by family. Denies any other complaints at this time. Currently denies HA CP N/V no SOB        PAST MEDICAL & SURGICAL HISTORY:  HTN (hypertension)      HLD (hyperlipidemia)      Gout      Diabetes      No significant past surgical history      FAMILY HISTORY:  No pertinent family history in first degree relatives    [place]    Social History:Non smoker    MEDICATIONS  (STANDING):  albuterol/ipratropium for Nebulization 3 milliLiter(s) Nebulizer every 6 hours  allopurinol 100 milliGRAM(s) Oral daily  amLODIPine   Tablet 5 milliGRAM(s) Oral daily  dextrose 10% Bolus 125 milliLiter(s) IV Bolus once  dextrose 5%. 1000 milliLiter(s) (100 mL/Hr) IV Continuous <Continuous>  dextrose 5%. 1000 milliLiter(s) (50 mL/Hr) IV Continuous <Continuous>  dextrose 50% Injectable 12.5 Gram(s) IV Push once  dextrose 50% Injectable 25 Gram(s) IV Push once  glucagon  Injectable 1 milliGRAM(s) IntraMuscular once  heparin   Injectable 5000 Unit(s) SubCutaneous every 8 hours  insulin lispro (ADMELOG) corrective regimen sliding scale   SubCutaneous Before meals and at bedtime  lisinopril 20 milliGRAM(s) Oral daily  QUEtiapine 12.5 milliGRAM(s) Oral at bedtime  simvastatin 40 milliGRAM(s) Oral at bedtime  sodium chloride 0.9% lock flush 3 milliLiter(s) IV Push every 8 hours    MEDICATIONS  (PRN):  acetaminophen     Tablet .. 650 milliGRAM(s) Oral every 6 hours PRN Temp greater or equal to 38C (100.4F), Mild Pain (1 - 3)  aluminum hydroxide/magnesium hydroxide/simethicone Suspension 30 milliLiter(s) Oral every 4 hours PRN Dyspepsia  dextrose Oral Gel 15 Gram(s) Oral once PRN Blood Glucose LESS THAN 70 milliGRAM(s)/deciliter  melatonin 3 milliGRAM(s) Oral at bedtime PRN Insomnia  ondansetron Injectable 4 milliGRAM(s) IV Push every 8 hours PRN Nausea and/or Vomiting   Meds reviewed    Vital Signs Last 24 Hrs  T(C): 36.6 (2024 12:15), Max: 37 (15 Apr 2024 17:19)  T(F): 97.8 (2024 12:15), Max: 98.6 (15 Apr 2024 17:19)  HR: 104 (2024 12:15) (91 - 109)  BP: 125/73 (2024 12:15) (104/65 - 145/77)  BP(mean): --  RR: 18 (2024 12:15) (15 - 18)  SpO2: 98% (2024 12:15) (92% - 98%)    Parameters below as of 2024 12:15  Patient On (Oxygen Delivery Method): room air      Daily Height in cm: 182.88 (2024 12:15)    Daily Weight in k.5 (2024 04:53)    PHYSICAL EXAM:    GENERAL: NAD  HEAD: NCAT  EYES: EOMI  NECK: Supple  NERVOUS SYSTEM:  Alert & Oriented X3  CHEST/LUNG: Clear to percussion bilaterally  HEART: Regular rate and rhythm  ABDOMEN: Soft, Nontender, Nondistended; +BS  EXTREMITIES:  trace edema      LABS:                        10.0   6.72  )-----------( 236      ( 2024 04:30 )             31.0     04-16    140  |  104  |  48.0<H>  ----------------------------<  180<H>  3.9   |  21.0<L>  |  1.71<H>    Ca    8.4      2024 04:30  Phos  4.2     04-15  Mg     2.1     04-15    TPro  5.9<L>  /  Alb  3.5  /  TBili  0.3<L>  /  DBili  x   /  AST  17  /  ALT  14  /  AlkPhos  61  04-16      Urinalysis Basic - ( 2024 04:30 )    Color: x / Appearance: x / SG: x / pH: x  Gluc: 180 mg/dL / Ketone: x  / Bili: x / Urobili: x   Blood: x / Protein: x / Nitrite: x   Leuk Esterase: x / RBC: x / WBC x   Sq Epi: x / Non Sq Epi: x / Bacteria: x              RADIOLOGY & ADDITIONAL TESTS:  
HPI  83y Male with PMHx of HTN, HLD, DMII, and gout who presented to [facility] from and urgent care due to worsening shortness of breath and hypoxia. Patient states that for the last few weeks he has been experiencing worsening dyspnea on exertion, cough, and orthopnea. Patient states that he went to his PMD who gave him flonase and zyrtec, but his symptoms continued to worsen. Patient states yesterday he went to urgent care and was noted to be hypoxic, so he was sent to [facility] for further management. Patient noted to have worsening leg swelling as well, hypoxia, and CXR with pulmonary vascular congestion. Work up notable for Acute HFpEF, severe AS, severe calcium score on CT. Was diureses with improvement in symptoms. Now with new ANDREW. Pending LHC. CTS consulted for TAVR eval.    Patient seem and examined. Family present at bedside. Pt [place], in NAD, pleasant elderly male. States was in normal, very active state of health until past few weeks with progressive fatigue, ROGERS, LE edema, and orthopnea. No reports of CP, SOB at rest, weight loss, HA, dizziness, syncope, palpitations, abd pain, n/v/c/d, numbness/tingling in extremities. No family history of CVD. No toxic habits.    REVIEW OF SYSTEMS  GENERAL:  Fevers[] chills[] sweats[] fatigue[] weight loss[] weight gain []                                        NEURO:  parathesias[] seizures []  syncope []  confusion []                                                                                  EYES: glasses[]  blurry vision[]  discharge[] pain[] glaucoma []                                                                            ENMT:  difficulty hearing []  vertigo[]  dysphagia[] epistaxis[] recent dental work []                                      CV:  chest pain[] palpitations[] ROGERS [] diaphoresis [] edema[]                                                                                             RESPIRATORY:  wheezing[] SOB[] cough [] sputum[] hemoptysis[]                                                                    GI:  nausea[]  vomiting []  diarrhea[] constipation [] melena []                                                                        : hematuria[ ]  dysuria[ ] urgency[] incontinence[]                                                                                              MUSKULOSKELETAL:  arthritis[ ]  joint swelling [ ] muscle weakness [ ]                                                                  SKIN/BREAST:  rash[ ] itching [ ]  hair loss[ ] masses[ ]                                                                                                PSYCH:  dementia [ ] depresion [ ] anxiety[ ]                                                                                                                  HEME/LYMPH:  bruises easily[ ] enlarged lymph nodes[ ] tender lymph nodes[ ]                                                 ENDOCRINE:  cold intolerance[ ] heat intolerance[ ] polydipsia[ ]         PAST MEDICAL & SURGICAL HISTORY:  HTN (hypertension)  HLD (hyperlipidemia)  Gout  Diabetes  No significant past surgical history    MEDICATIONS  (STANDING):  albuterol/ipratropium for Nebulization 3 milliLiter(s) Nebulizer every 6 hours  allopurinol 100 milliGRAM(s) Oral daily  amLODIPine   Tablet 2.5 milliGRAM(s) Oral at bedtime  dextrose 10% Bolus 125 milliLiter(s) IV Bolus once  dextrose 5% + sodium chloride 0.9%. 1000 milliLiter(s) (60 mL/Hr) IV Continuous <Continuous>  dextrose 5%. 1000 milliLiter(s) (50 mL/Hr) IV Continuous <Continuous>  dextrose 5%. 1000 milliLiter(s) (100 mL/Hr) IV Continuous <Continuous>  dextrose 50% Injectable 12.5 Gram(s) IV Push once  dextrose 50% Injectable 25 Gram(s) IV Push once  glucagon  Injectable 1 milliGRAM(s) IntraMuscular once  heparin   Injectable 5000 Unit(s) SubCutaneous every 8 hours  insulin lispro (ADMELOG) corrective regimen sliding scale   SubCutaneous Before meals and at bedtime  QUEtiapine 12.5 milliGRAM(s) Oral at bedtime  simvastatin 40 milliGRAM(s) Oral at bedtime  sodium bicarbonate 650 milliGRAM(s) Oral every 8 hours  sodium chloride 0.9% lock flush 3 milliLiter(s) IV Push every 8 hours  sodium chloride 0.9%. 1000 milliLiter(s) (50 mL/Hr) IV Continuous <Continuous>    MEDICATIONS  (PRN):  acetaminophen     Tablet .. 650 milliGRAM(s) Oral every 6 hours PRN Temp greater or equal to 38C (100.4F), Mild Pain (1 - 3)  aluminum hydroxide/magnesium hydroxide/simethicone Suspension 30 milliLiter(s) Oral every 4 hours PRN Dyspepsia  dextrose Oral Gel 15 Gram(s) Oral once PRN Blood Glucose LESS THAN 70 milliGRAM(s)/deciliter  melatonin 3 milliGRAM(s) Oral at bedtime PRN Insomnia  ondansetron Injectable 4 milliGRAM(s) IV Push every 8 hours PRN Nausea and/or Vomiting                                                                 ALLERGIES  No Known Allergies    SOCIAL HISTORY  Lives in private home with family  Occasional use of stairs  No assistive devices  Retired, no work/environmental exposure  Former smoker, quit 65 years ago  Occasional alcohol use  Independent of ADLs  No functional decline    FAMILY HISTORY  Mother- [_] DM   [_] CAD   [_] MI < 55  Father- [_] DM    [_] CAD   [_] MI < 55    VITAL SIGNS  T(F): 98.3 (24 @ 16:52)  HR: 100 (24 @ 16:52)  BP: 116/65 (24 @ 16:52)  RR: 20 (24 @ 16:52)  SpO2: 96% (24 @ 16:52)    LABS                        10.4   7.36  )-----------( 225      ( 2024 05:16 )             32.3         138  |  101  |  52.1<H>  ----------------------------<  161<H>  4.2   |  23.0  |  1.65<H>    Ca    8.6      2024 05:16    TPro  5.8<L>  /  Alb  3.6  /  TBili  0.3<L>  /  DBili  x   /  AST  13  /  ALT  11  /  AlkPhos  67  -    Urinalysis Basic - ( 2024 16:44 )    Color: Yellow / Appearance: Clear / S.020 / pH: x  Gluc: x / Ketone: Negative mg/dL  / Bili: Negative / Urobili: 0.2 mg/dL   Blood: x / Protein: Negative mg/dL / Nitrite: Negative   Leuk Esterase: Negative / RBC: x / WBC x   Sq Epi: x / Non Sq Epi: x / Bacteria: x    Ins/Outs  I&O's Summary    2024 07:01  -  2024 07:00  --------------------------------------------------------  IN: 960 mL / OUT: 0 mL / NET: 960 mL    2024 07:01  -  2024 17:10  --------------------------------------------------------  IN: 120 mL / OUT: 0 mL / NET: 120 mL    DIAGNOSTICS  All relevant and available laboratory results, radiology and medications reviewed.    PHYSICAL EXAM  Constitutional:  NAD, well developed, appears stated age  Neck: supple, trachea midline. No JVD   Respiratory: Breath sounds diminished b/l lower fields to auscultation, no accessory muscle use noted. No wheezing, rales, or rhonchi noted b/l   Cardiovascular: Regular rate, regular rhythm, normal S1, S2; no murmurs or rub   Gastrointestinal: Soft, non-tender, non distended, + bowel sounds   Extremities: RAO x 4, trace LE peripheral edema, no cyanosis, no clubbing    Vascular: Equal and normal pulses: 2+ peripheral pulses throughout, RLE varicosities  Neurological: A+O x 3; speech clear and intact; no gross sensory/motor deficits  Psychiatric: calm, normal mood, normal affect   Skin: warm, dry, well perfused, no rashes, b/l groins without breakdown, CDI

## 2024-04-19 NOTE — PROGRESS NOTE ADULT - PROVIDER SPECIALTY LIST ADULT
Hospitalist
Hospitalist
Intervent Cardiology
Hospitalist
Hospitalist
Nephrology
CT Surgery
Hospitalist
Intervent Cardiology
Nephrology
Hospitalist
Cardiology

## 2024-04-19 NOTE — PROGRESS NOTE ADULT - SUBJECTIVE AND OBJECTIVE BOX
Acute respiratory failure with hypoxia    HPI:  84 y/o male sent in from urgent care for hypoxia after was recently seen by PMD for c/o cough/SOB. No recent travel, sick contacts or changes in medications. No hx of PNA/aspiration and no reports of coughing with eating. Patient hx of sig for DM-2, HTN, HLD, Gout. Patient able to do most of his ADLs without assistive devices, no reports of falls. He was put on Flonase and Zyrtec by his PMD before going to  today. In the ED noted to by hypoxic to the high 80s at rest. No fever, chills, N/V or CP. Also admits to intermittent LE edema, and orthopnea. He was given steroids/nebs in ED with no improvement. CXR with PVC, and small right pleural effusion. No leucocytosis or shift. Flu/Covid/RSV neg. Patient also noted to have harsh ANUJ with no hx of murmur reported by family. Denies any other complaints at this time.  (14 Apr 2024 00:45)    Interval History:  Patient was seen and examined at bedside around 10:30 am.  Feels better.   Has intermittent cough.   Denies chest pain, palpitations, shortness of breath, headache, dizziness, visual symptoms, nausea, vomiting or abdominal pain.    ROS:  As per interval history otherwise unremarkable.    PHYSICAL EXAM:  Vital Signs   T(C): 36.3 (19 Apr 2024 08:56), Max: 36.7 (19 Apr 2024 05:24)  T(F): 97.3 (19 Apr 2024 08:56), Max: 98.1 (19 Apr 2024 05:24)  HR: 89 (19 Apr 2024 09:24) (82 - 93)  BP: 149/68 (19 Apr 2024 08:56) (132/72 - 149/68)  BP(mean): 92 (18 Apr 2024 21:01) (92 - 92)  RR: 20 (19 Apr 2024 08:56) (20 - 20)  SpO2: 96% (19 Apr 2024 09:24) (95% - 98%)  Parameters below as of 19 Apr 2024 09:24  Patient On (Oxygen Delivery Method): nasal cannula  General: Elderly male lying in bed comfortably. No acute distress  HEENT: EOMI. Clear conjunctivae. Moist mucus membrane. Hard of hearing.   Neck: Supple.   Chest: Good air entry. No wheezing, rales or rhonchi.   Heart: Normal S1 & S2. RRR.   Abdomen: Non distended. Soft. Non-tender. + BS  Ext: No pedal edema. No calf tenderness   Neuro: Awake and alert. No focal deficit. Speech clear.   Skin: Warm and Dry  Psychiatry: Normal mood and affect    I&O's Summary    18 Apr 2024 07:01  -  19 Apr 2024 07:00  --------------------------------------------------------  IN: 1260 mL / OUT: 0 mL / NET: 1260 mL    LABS:  CAPILLARY BLOOD GLUCOSE  POCT Blood Glucose.: 328 mg/dL (19 Apr 2024 12:44)  POCT Blood Glucose.: 203 mg/dL (19 Apr 2024 08:25)  POCT Blood Glucose.: 174 mg/dL (18 Apr 2024 21:23)  POCT Blood Glucose.: 279 mg/dL (18 Apr 2024 17:06)                      11.3   6.43  )-----------( 227      ( 19 Apr 2024 08:31 )             35.0     04-19    138  |  102  |  15.6  ----------------------------<  202<H>  4.6   |  22.0  |  0.87    Ca    8.7      19 Apr 2024 08:31    TPro  6.6  /  Alb  3.8  /  TBili  0.4  /  DBili  x   /  AST  21  /  ALT  13  /  AlkPhos  76  04-19    Urinalysis Basic - ( 19 Apr 2024 08:31 )    Color: x / Appearance: x / SG: x / pH: x  Gluc: 202 mg/dL / Ketone: x  / Bili: x / Urobili: x   Blood: x / Protein: x / Nitrite: x   Leuk Esterase: x / RBC: x / WBC x   Sq Epi: x / Non Sq Epi: x / Bacteria: x    RADIOLOGY & ADDITIONAL STUDIES:  Reviewed     MEDICATIONS  (STANDING):  albuterol/ipratropium for Nebulization 3 milliLiter(s) Nebulizer every 6 hours  allopurinol 100 milliGRAM(s) Oral daily  amLODIPine   Tablet 2.5 milliGRAM(s) Oral at bedtime  dextrose 10% Bolus 125 milliLiter(s) IV Bolus once  dextrose 5%. 1000 milliLiter(s) (100 mL/Hr) IV Continuous <Continuous>  dextrose 5%. 1000 milliLiter(s) (50 mL/Hr) IV Continuous <Continuous>  dextrose 50% Injectable 12.5 Gram(s) IV Push once  dextrose 50% Injectable 25 Gram(s) IV Push once  glucagon  Injectable 1 milliGRAM(s) IntraMuscular once  heparin   Injectable 5000 Unit(s) SubCutaneous every 8 hours  insulin lispro (ADMELOG) corrective regimen sliding scale   SubCutaneous Before meals and at bedtime  QUEtiapine 12.5 milliGRAM(s) Oral at bedtime  simvastatin 40 milliGRAM(s) Oral at bedtime  sodium bicarbonate 650 milliGRAM(s) Oral every 8 hours  sodium chloride 0.9% lock flush 3 milliLiter(s) IV Push every 8 hours    MEDICATIONS  (PRN):  acetaminophen     Tablet .. 650 milliGRAM(s) Oral every 6 hours PRN Temp greater or equal to 38C (100.4F), Mild Pain (1 - 3)  aluminum hydroxide/magnesium hydroxide/simethicone Suspension 30 milliLiter(s) Oral every 4 hours PRN Dyspepsia  dextrose Oral Gel 15 Gram(s) Oral once PRN Blood Glucose LESS THAN 70 milliGRAM(s)/deciliter  melatonin 3 milliGRAM(s) Oral at bedtime PRN Insomnia  ondansetron Injectable 4 milliGRAM(s) IV Push every 8 hours PRN Nausea and/or Vomiting

## 2024-04-20 ENCOUNTER — TRANSCRIPTION ENCOUNTER (OUTPATIENT)
Age: 84
End: 2024-04-20

## 2024-04-20 VITALS — OXYGEN SATURATION: 100 %

## 2024-04-20 LAB
ANION GAP SERPL CALC-SCNC: 12 MMOL/L — SIGNIFICANT CHANGE UP (ref 5–17)
BUN SERPL-MCNC: 14.8 MG/DL — SIGNIFICANT CHANGE UP (ref 8–20)
CALCIUM SERPL-MCNC: 8.7 MG/DL — SIGNIFICANT CHANGE UP (ref 8.4–10.5)
CHLORIDE SERPL-SCNC: 103 MMOL/L — SIGNIFICANT CHANGE UP (ref 96–108)
CO2 SERPL-SCNC: 23 MMOL/L — SIGNIFICANT CHANGE UP (ref 22–29)
CREAT SERPL-MCNC: 0.88 MG/DL — SIGNIFICANT CHANGE UP (ref 0.5–1.3)
EGFR: 85 ML/MIN/1.73M2 — SIGNIFICANT CHANGE UP
GLUCOSE BLDC GLUCOMTR-MCNC: 151 MG/DL — HIGH (ref 70–99)
GLUCOSE SERPL-MCNC: 122 MG/DL — HIGH (ref 70–99)
MAGNESIUM SERPL-MCNC: 1.5 MG/DL — LOW (ref 1.6–2.6)
POTASSIUM SERPL-MCNC: 4.3 MMOL/L — SIGNIFICANT CHANGE UP (ref 3.5–5.3)
POTASSIUM SERPL-SCNC: 4.3 MMOL/L — SIGNIFICANT CHANGE UP (ref 3.5–5.3)
SODIUM SERPL-SCNC: 138 MMOL/L — SIGNIFICANT CHANGE UP (ref 135–145)

## 2024-04-20 PROCEDURE — 85014 HEMATOCRIT: CPT

## 2024-04-20 PROCEDURE — 84443 ASSAY THYROID STIM HORMONE: CPT

## 2024-04-20 PROCEDURE — 85018 HEMOGLOBIN: CPT

## 2024-04-20 PROCEDURE — 80053 COMPREHEN METABOLIC PANEL: CPT

## 2024-04-20 PROCEDURE — 82436 ASSAY OF URINE CHLORIDE: CPT

## 2024-04-20 PROCEDURE — 83605 ASSAY OF LACTIC ACID: CPT

## 2024-04-20 PROCEDURE — 84295 ASSAY OF SERUM SODIUM: CPT

## 2024-04-20 PROCEDURE — 84134 ASSAY OF PREALBUMIN: CPT

## 2024-04-20 PROCEDURE — 36415 COLL VENOUS BLD VENIPUNCTURE: CPT

## 2024-04-20 PROCEDURE — C1769: CPT

## 2024-04-20 PROCEDURE — 80048 BASIC METABOLIC PNL TOTAL CA: CPT

## 2024-04-20 PROCEDURE — 83935 ASSAY OF URINE OSMOLALITY: CPT

## 2024-04-20 PROCEDURE — 82435 ASSAY OF BLOOD CHLORIDE: CPT

## 2024-04-20 PROCEDURE — 75574 CT ANGIO HRT W/3D IMAGE: CPT | Mod: MC

## 2024-04-20 PROCEDURE — 94010 BREATHING CAPACITY TEST: CPT

## 2024-04-20 PROCEDURE — 82330 ASSAY OF CALCIUM: CPT

## 2024-04-20 PROCEDURE — 82803 BLOOD GASES ANY COMBINATION: CPT

## 2024-04-20 PROCEDURE — 96375 TX/PRO/DX INJ NEW DRUG ADDON: CPT

## 2024-04-20 PROCEDURE — 87641 MR-STAPH DNA AMP PROBE: CPT

## 2024-04-20 PROCEDURE — 94640 AIRWAY INHALATION TREATMENT: CPT

## 2024-04-20 PROCEDURE — 81003 URINALYSIS AUTO W/O SCOPE: CPT

## 2024-04-20 PROCEDURE — 93880 EXTRACRANIAL BILAT STUDY: CPT

## 2024-04-20 PROCEDURE — 84300 ASSAY OF URINE SODIUM: CPT

## 2024-04-20 PROCEDURE — 99285 EMERGENCY DEPT VISIT HI MDM: CPT

## 2024-04-20 PROCEDURE — 80061 LIPID PANEL: CPT

## 2024-04-20 PROCEDURE — 93456 R HRT CORONARY ARTERY ANGIO: CPT

## 2024-04-20 PROCEDURE — 84132 ASSAY OF SERUM POTASSIUM: CPT

## 2024-04-20 PROCEDURE — 75571 CT HRT W/O DYE W/CA TEST: CPT | Mod: MC

## 2024-04-20 PROCEDURE — 82947 ASSAY GLUCOSE BLOOD QUANT: CPT

## 2024-04-20 PROCEDURE — 74220 X-RAY XM ESOPHAGUS 1CNTRST: CPT

## 2024-04-20 PROCEDURE — 84133 ASSAY OF URINE POTASSIUM: CPT

## 2024-04-20 PROCEDURE — 93005 ELECTROCARDIOGRAM TRACING: CPT

## 2024-04-20 PROCEDURE — 71275 CT ANGIOGRAPHY CHEST: CPT

## 2024-04-20 PROCEDURE — 99239 HOSP IP/OBS DSCHRG MGMT >30: CPT

## 2024-04-20 PROCEDURE — 82962 GLUCOSE BLOOD TEST: CPT

## 2024-04-20 PROCEDURE — 92610 EVALUATE SWALLOWING FUNCTION: CPT

## 2024-04-20 PROCEDURE — C1894: CPT

## 2024-04-20 PROCEDURE — 74174 CTA ABD&PLVS W/CONTRAST: CPT | Mod: MC

## 2024-04-20 PROCEDURE — 93306 TTE W/DOPPLER COMPLETE: CPT

## 2024-04-20 PROCEDURE — 94760 N-INVAS EAR/PLS OXIMETRY 1: CPT

## 2024-04-20 PROCEDURE — 87640 STAPH A DNA AMP PROBE: CPT

## 2024-04-20 PROCEDURE — 85025 COMPLETE CBC W/AUTO DIFF WBC: CPT

## 2024-04-20 PROCEDURE — 93970 EXTREMITY STUDY: CPT

## 2024-04-20 PROCEDURE — 85027 COMPLETE CBC AUTOMATED: CPT

## 2024-04-20 PROCEDURE — 83036 HEMOGLOBIN GLYCOSYLATED A1C: CPT

## 2024-04-20 PROCEDURE — 96374 THER/PROPH/DIAG INJ IV PUSH: CPT

## 2024-04-20 PROCEDURE — C1887: CPT

## 2024-04-20 PROCEDURE — 87637 SARSCOV2&INF A&B&RSV AMP PRB: CPT

## 2024-04-20 PROCEDURE — 84100 ASSAY OF PHOSPHORUS: CPT

## 2024-04-20 PROCEDURE — 83880 ASSAY OF NATRIURETIC PEPTIDE: CPT

## 2024-04-20 PROCEDURE — 83735 ASSAY OF MAGNESIUM: CPT

## 2024-04-20 PROCEDURE — 71046 X-RAY EXAM CHEST 2 VIEWS: CPT

## 2024-04-20 RX ORDER — MAGNESIUM SULFATE 500 MG/ML
2 VIAL (ML) INJECTION ONCE
Refills: 0 | Status: COMPLETED | OUTPATIENT
Start: 2024-04-20 | End: 2024-04-20

## 2024-04-20 RX ORDER — MAGNESIUM OXIDE 400 MG ORAL TABLET 241.3 MG
400 TABLET ORAL
Refills: 0 | Status: DISCONTINUED | OUTPATIENT
Start: 2024-04-20 | End: 2024-04-20

## 2024-04-20 RX ADMIN — Medication 650 MILLIGRAM(S): at 05:25

## 2024-04-20 RX ADMIN — Medication 25 GRAM(S): at 08:49

## 2024-04-20 RX ADMIN — Medication 3 MILLILITER(S): at 08:38

## 2024-04-20 RX ADMIN — HEPARIN SODIUM 5000 UNIT(S): 5000 INJECTION INTRAVENOUS; SUBCUTANEOUS at 05:25

## 2024-04-20 RX ADMIN — SODIUM CHLORIDE 3 MILLILITER(S): 9 INJECTION INTRAMUSCULAR; INTRAVENOUS; SUBCUTANEOUS at 05:26

## 2024-04-20 RX ADMIN — Medication 2: at 09:22

## 2024-04-20 RX ADMIN — MAGNESIUM OXIDE 400 MG ORAL TABLET 400 MILLIGRAM(S): 241.3 TABLET ORAL at 08:49

## 2024-04-20 NOTE — DISCHARGE NOTE NURSING/CASE MANAGEMENT/SOCIAL WORK - PATIENT PORTAL LINK FT
You can access the FollowMyHealth Patient Portal offered by [facility] by registering at the following website: [email or web address]. By joining [email or web address]’s FollowMyHealth portal, you will also be able to view your health information using other applications (apps) compatible with our system.

## 2024-04-22 ENCOUNTER — NON-APPOINTMENT (OUTPATIENT)
Age: 84
End: 2024-04-22

## 2024-04-22 PROBLEM — E78.5 HYPERLIPIDEMIA, UNSPECIFIED: Chronic | Status: ACTIVE | Noted: 2024-04-14

## 2024-04-22 PROBLEM — E11.9 TYPE 2 DIABETES MELLITUS WITHOUT COMPLICATIONS: Chronic | Status: ACTIVE | Noted: 2024-04-14

## 2024-04-22 PROBLEM — I10 ESSENTIAL (PRIMARY) HYPERTENSION: Chronic | Status: ACTIVE | Noted: 2024-04-14

## 2024-04-22 PROBLEM — M10.9 GOUT, UNSPECIFIED: Chronic | Status: ACTIVE | Noted: 2024-04-14

## 2024-04-24 ENCOUNTER — APPOINTMENT (OUTPATIENT)
Dept: FAMILY MEDICINE | Facility: CLINIC | Age: 84
End: 2024-04-24
Payer: MEDICARE

## 2024-04-24 VITALS
SYSTOLIC BLOOD PRESSURE: 130 MMHG | OXYGEN SATURATION: 95 % | BODY MASS INDEX: 19.88 KG/M2 | HEART RATE: 71 BPM | TEMPERATURE: 98 F | DIASTOLIC BLOOD PRESSURE: 68 MMHG | WEIGHT: 146.8 LBS | HEIGHT: 72 IN

## 2024-04-24 DIAGNOSIS — G31.84 MILD COGNITIVE IMPAIRMENT, SO STATED: ICD-10-CM

## 2024-04-24 PROCEDURE — 99495 TRANSJ CARE MGMT MOD F2F 14D: CPT

## 2024-04-26 ENCOUNTER — NON-APPOINTMENT (OUTPATIENT)
Age: 84
End: 2024-04-26

## 2024-05-03 RX ORDER — QUETIAPINE FUMARATE 25 MG/1
25 TABLET ORAL
Qty: 90 | Refills: 0 | Status: ACTIVE | COMMUNITY
Start: 2024-04-22 | End: 1900-01-01

## 2024-05-20 ENCOUNTER — OUTPATIENT (OUTPATIENT)
Dept: OUTPATIENT SERVICES | Facility: HOSPITAL | Age: 84
LOS: 1 days | End: 2024-05-20
Payer: MEDICARE

## 2024-05-20 VITALS
WEIGHT: 149.25 LBS | OXYGEN SATURATION: 99 % | HEIGHT: 69 IN | TEMPERATURE: 98 F | SYSTOLIC BLOOD PRESSURE: 160 MMHG | DIASTOLIC BLOOD PRESSURE: 70 MMHG | RESPIRATION RATE: 18 BRPM | HEART RATE: 95 BPM

## 2024-05-20 DIAGNOSIS — I10 ESSENTIAL (PRIMARY) HYPERTENSION: ICD-10-CM

## 2024-05-20 DIAGNOSIS — E11.9 TYPE 2 DIABETES MELLITUS WITHOUT COMPLICATIONS: ICD-10-CM

## 2024-05-20 DIAGNOSIS — I35.0 NONRHEUMATIC AORTIC (VALVE) STENOSIS: ICD-10-CM

## 2024-05-20 DIAGNOSIS — Z01.818 ENCOUNTER FOR OTHER PREPROCEDURAL EXAMINATION: ICD-10-CM

## 2024-05-20 DIAGNOSIS — Z29.9 ENCOUNTER FOR PROPHYLACTIC MEASURES, UNSPECIFIED: ICD-10-CM

## 2024-05-20 DIAGNOSIS — Z91.89 OTHER SPECIFIED PERSONAL RISK FACTORS, NOT ELSEWHERE CLASSIFIED: ICD-10-CM

## 2024-05-20 DIAGNOSIS — Z86.79 PERSONAL HISTORY OF OTHER DISEASES OF THE CIRCULATORY SYSTEM: ICD-10-CM

## 2024-05-20 DIAGNOSIS — E78.5 HYPERLIPIDEMIA, UNSPECIFIED: ICD-10-CM

## 2024-05-20 LAB
A1C WITH ESTIMATED AVERAGE GLUCOSE RESULT: 6.7 % — HIGH (ref 4–5.6)
ALBUMIN SERPL ELPH-MCNC: 4.2 G/DL — SIGNIFICANT CHANGE UP (ref 3.3–5.2)
ALP SERPL-CCNC: 61 U/L — SIGNIFICANT CHANGE UP (ref 40–120)
ALT FLD-CCNC: 17 U/L — SIGNIFICANT CHANGE UP
ANION GAP SERPL CALC-SCNC: 12 MMOL/L — SIGNIFICANT CHANGE UP (ref 5–17)
APPEARANCE UR: CLEAR — SIGNIFICANT CHANGE UP
APTT BLD: 29.8 SEC — SIGNIFICANT CHANGE UP (ref 24.5–35.6)
AST SERPL-CCNC: 26 U/L — SIGNIFICANT CHANGE UP
BASOPHILS # BLD AUTO: 0.06 K/UL — SIGNIFICANT CHANGE UP (ref 0–0.2)
BASOPHILS NFR BLD AUTO: 0.9 % — SIGNIFICANT CHANGE UP (ref 0–2)
BILIRUB SERPL-MCNC: 0.2 MG/DL — LOW (ref 0.4–2)
BILIRUB UR-MCNC: NEGATIVE — SIGNIFICANT CHANGE UP
BLD GP AB SCN SERPL QL: SIGNIFICANT CHANGE UP
BUN SERPL-MCNC: 27.5 MG/DL — HIGH (ref 8–20)
CALCIUM SERPL-MCNC: 9.4 MG/DL — SIGNIFICANT CHANGE UP (ref 8.4–10.5)
CHLORIDE SERPL-SCNC: 106 MMOL/L — SIGNIFICANT CHANGE UP (ref 96–108)
CO2 SERPL-SCNC: 21 MMOL/L — LOW (ref 22–29)
COLOR SPEC: YELLOW — SIGNIFICANT CHANGE UP
CREAT SERPL-MCNC: 1.04 MG/DL — SIGNIFICANT CHANGE UP (ref 0.5–1.3)
DIFF PNL FLD: NEGATIVE — SIGNIFICANT CHANGE UP
EGFR: 71 ML/MIN/1.73M2 — SIGNIFICANT CHANGE UP
EOSINOPHIL # BLD AUTO: 0.23 K/UL — SIGNIFICANT CHANGE UP (ref 0–0.5)
EOSINOPHIL NFR BLD AUTO: 3.3 % — SIGNIFICANT CHANGE UP (ref 0–6)
ESTIMATED AVERAGE GLUCOSE: 146 MG/DL — HIGH (ref 68–114)
GLUCOSE SERPL-MCNC: 81 MG/DL — SIGNIFICANT CHANGE UP (ref 70–99)
GLUCOSE UR QL: NEGATIVE MG/DL — SIGNIFICANT CHANGE UP
HCT VFR BLD CALC: 33.7 % — LOW (ref 39–50)
HGB BLD-MCNC: 10.5 G/DL — LOW (ref 13–17)
IMM GRANULOCYTES NFR BLD AUTO: 0.6 % — SIGNIFICANT CHANGE UP (ref 0–0.9)
INR BLD: 0.89 RATIO — SIGNIFICANT CHANGE UP (ref 0.85–1.18)
KETONES UR-MCNC: NEGATIVE MG/DL — SIGNIFICANT CHANGE UP
LEUKOCYTE ESTERASE UR-ACNC: NEGATIVE — SIGNIFICANT CHANGE UP
LYMPHOCYTES # BLD AUTO: 1.41 K/UL — SIGNIFICANT CHANGE UP (ref 1–3.3)
LYMPHOCYTES # BLD AUTO: 20.4 % — SIGNIFICANT CHANGE UP (ref 13–44)
MCHC RBC-ENTMCNC: 28.3 PG — SIGNIFICANT CHANGE UP (ref 27–34)
MCHC RBC-ENTMCNC: 31.2 GM/DL — LOW (ref 32–36)
MCV RBC AUTO: 90.8 FL — SIGNIFICANT CHANGE UP (ref 80–100)
MONOCYTES # BLD AUTO: 0.74 K/UL — SIGNIFICANT CHANGE UP (ref 0–0.9)
MONOCYTES NFR BLD AUTO: 10.7 % — SIGNIFICANT CHANGE UP (ref 2–14)
NEUTROPHILS # BLD AUTO: 4.43 K/UL — SIGNIFICANT CHANGE UP (ref 1.8–7.4)
NEUTROPHILS NFR BLD AUTO: 64.1 % — SIGNIFICANT CHANGE UP (ref 43–77)
NITRITE UR-MCNC: NEGATIVE — SIGNIFICANT CHANGE UP
NT-PROBNP SERPL-SCNC: 561 PG/ML — HIGH (ref 0–300)
PH UR: 5.5 — SIGNIFICANT CHANGE UP (ref 5–8)
PLATELET # BLD AUTO: 224 K/UL — SIGNIFICANT CHANGE UP (ref 150–400)
POTASSIUM SERPL-MCNC: 5.5 MMOL/L — HIGH (ref 3.5–5.3)
POTASSIUM SERPL-SCNC: 5.5 MMOL/L — HIGH (ref 3.5–5.3)
PREALB SERPL-MCNC: 24 MG/DL — SIGNIFICANT CHANGE UP (ref 18–38)
PROT SERPL-MCNC: 6.7 G/DL — SIGNIFICANT CHANGE UP (ref 6.6–8.7)
PROT UR-MCNC: NEGATIVE MG/DL — SIGNIFICANT CHANGE UP
PROTHROM AB SERPL-ACNC: 9.9 SEC — SIGNIFICANT CHANGE UP (ref 9.5–13)
RBC # BLD: 3.71 M/UL — LOW (ref 4.2–5.8)
RBC # FLD: 15.7 % — HIGH (ref 10.3–14.5)
SODIUM SERPL-SCNC: 139 MMOL/L — SIGNIFICANT CHANGE UP (ref 135–145)
SP GR SPEC: 1.02 — SIGNIFICANT CHANGE UP (ref 1–1.03)
T3 SERPL-MCNC: 78 NG/DL — LOW (ref 80–200)
T4 AB SER-ACNC: 6.1 UG/DL — SIGNIFICANT CHANGE UP (ref 4.5–12)
TSH SERPL-MCNC: 2.57 UIU/ML — SIGNIFICANT CHANGE UP (ref 0.27–4.2)
UROBILINOGEN FLD QL: 1 MG/DL — SIGNIFICANT CHANGE UP (ref 0.2–1)
WBC # BLD: 6.91 K/UL — SIGNIFICANT CHANGE UP (ref 3.8–10.5)
WBC # FLD AUTO: 6.91 K/UL — SIGNIFICANT CHANGE UP (ref 3.8–10.5)

## 2024-05-20 PROCEDURE — 71046 X-RAY EXAM CHEST 2 VIEWS: CPT | Mod: 26

## 2024-05-20 PROCEDURE — 93010 ELECTROCARDIOGRAM REPORT: CPT

## 2024-05-20 RX ORDER — METOPROLOL TARTRATE 50 MG
0.5 TABLET ORAL
Qty: 0 | Refills: 0 | DISCHARGE

## 2024-05-20 RX ORDER — CEFUROXIME AXETIL 250 MG
1500 TABLET ORAL ONCE
Refills: 0 | Status: DISCONTINUED | OUTPATIENT
Start: 2024-06-07 | End: 2024-06-08

## 2024-05-20 RX ORDER — QUETIAPINE FUMARATE 200 MG/1
0.5 TABLET, FILM COATED ORAL
Qty: 0 | Refills: 0 | DISCHARGE

## 2024-05-20 NOTE — H&P PST ADULT - NSICDXPASTMEDICALHX_GEN_ALL_CORE_FT
PAST MEDICAL HISTORY:  Anxiety     COVID-19 vaccine series completed     Diabetes     Gout     HLD (hyperlipidemia)     HTN (hypertension)      PAST MEDICAL HISTORY:  Anxiety     COVID-19 vaccine series completed     Diabetes     Gout     H/O congestive heart failure     HLD (hyperlipidemia)     HTN (hypertension)     Nonrheumatic aortic valve stenosis

## 2024-05-20 NOTE — H&P PST ADULT - NS PRO PASSIVE SMOKE EXP
No Implemented All Universal Safety Interventions:  [place] to call system. Call bell, personal items and telephone within reach. Instruct patient to call for assistance. Room bathroom lighting operational. Non-slip footwear when patient is off stretcher. Physically safe environment: no spills, clutter or unnecessary equipment. Stretcher in lowest position, wheels locked, appropriate side rails in place.

## 2024-05-20 NOTE — H&P PST ADULT - ASSESSMENT
82 y/o male presents today to PST pending TAVR on 24 with Dr. Guillen secondary to nonrheumatic aortic valve stenosis. Pt. states that he was recently told he needed this procedure, states he had a bad cough that would not resolve, he went to an urgent care and he was sent to [facility] ER and he was then told that he needs a valve replacement, he was in CHF, and was treated at the hospital and he was sent home 24. Denies CP or SOB. Denies leg edema, or palpitations. History of HTN, HLD and type 2 DM. 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  BMI 22.0.    Pt. educated and instructed regarding all preoperative instructions and education as per policy via both verbal and written means of communication and pt. verbalized agreement and understanding.  Patient educated on surgical scrub, preadmission instructions,  and day of procedure medications, pt. verbalizes understanding and agreement.  Pt instructed to stop vitamins/supplements/herbal medications/NSAIDS for one week prior to surgery and pt. verbalized agreement and understanding.   Pt. advised to hold januvia, metformin and glimepiride on the morning of surgery and discuss with prescriber, pt. and daughter verbalized agreement and understanding.     OPIOID RISK TOOL    RYAN EACH BOX THAT APPLIES AND ADD TOTALS AT THE END    FAMILY HISTORY OF SUBSTANCE ABUSE                 FEMALE         MALE                                                Alcohol                             [  ]1 pt          [  ]3pts                                               Illegal Durgs                     [  ]2 pts        [  ]3pts                                               Rx Drugs                           [  ]4 pts        [  ]4 pts    PERSONAL HISTORY OF SUBSTANCE ABUSE                                                                                          Alcohol                             [  ]3 pts       [  ]3 pts                                               Illegal Drugs                     [  ]4 pts        [  ]4 pts                                               Rx Drugs                           [  ]5 pts        [  ]5 pts    AGE BETWEEN 16-45 YEARS                                      [  ]1 pt         [  ]1 pt    HISTORY OF PREADOLESCENT   SEXUAL ABUSE                                                             [  ]3 pts        [  ]0pts    PSYCHOLOGICAL DISEASE                     ADD, OCD, Bipolar, Schizophrenia        [  ]2 pts         [  ]2 pts                      Depression                                               [  ]1 pt           [  ]1 pt           SCORING TOTAL   (add numbers and type here)              (0)                                     A score of 3 or lower indicated LOW risk for future opioid abuse  A score of 4 to 7 indicated moderate risk for future opioid abuse  A score of 8 or higher indicates a high risk for opioid abuse    CAPRINI SCORE    AGE RELATED RISK FACTORS                                                             [ ] Age 41-60 years                                            (1 Point)  [ ] Age: 61-74 years                                           (2 Points)                 [x ] Age= 75 years                                                (3 Points)             DISEASE RELATED RISK FACTORS                                                       [ ] Edema in the lower extremities                 (1 Point)                     [ ] Varicose veins                                               (1 Point)                                 [ ] BMI > 25 Kg/m2                                            (1 Point)                                  [ ] Serious infection (ie PNA)                            (1 Point)                     [ ] Lung disease ( COPD, Emphysema)            (1 Point)                                                                          [ ] Acute myocardial infarction                         (1 Point)                  [ x] Congestive heart failure (in the previous month)  (1 Point)         [ ] Inflammatory bowel disease                            (1 Point)                  [ ] Central venous access, PICC or Port               (2 points)       (within the last month)                                                                [ ] Stroke (in the previous month)                        (5 Points)    [ ] Previous or present malignancy                       (2 points)                                                                                                                                                         HEMATOLOGY RELATED FACTORS                                                         [ ] Prior episodes of VTE                                     (3 Points)                     [ ] Positive family history for VTE                      (3 Points)                  [ ] Prothrombin 06991 A                                     (3 Points)                     [ ] Factor V Leiden                                                (3 Points)                        [ ] Lupus anticoagulants                                      (3 Points)                                                           [ ] Anticardiolipin antibodies                              (3 Points)                                                       [ ] High homocysteine in the blood                   (3 Points)                                             [ ] Other congenital or acquired thrombophilia      (3 Points)                                                [ ] Heparin induced thrombocytopenia                  (3 Points)                                        MOBILITY RELATED FACTORS  [ ] Bed rest                                                         (1 Point)  [ ] Plaster cast                                                    (2 points)  [ ] Bed bound for more than 72 hours           (2 Points)    GENDER SPECIFIC FACTORS  [ ] Pregnancy or had a baby within the last month   (1 Point)  [ ] Post-partum < 6 weeks                                   (1 Point)  [ ] Hormonal therapy  or oral contraception   (1 Point)  [ ] History of pregnancy complications              (1 point)  [ ] Unexplained or recurrent              (1 Point)    OTHER RISK FACTORS                                           (1 Point)  [ ] BMI >40, smoking, diabetes requiring insulin, chemotherapy  blood transfusions and length of surgery over 2 hours    SURGERY RELATED RISK FACTORS  [ ]  Section within the last month     (1 Point)  [ ] Minor surgery                                                  (1 Point)  [ ] Arthroscopic surgery                                       (2 Points)  [ x] Planned major surgery lasting more            (2 Points)      than 45 minutes     [ ] Elective hip or knee joint replacement       (5 points)       surgery                                                TRAUMA RELATED RISK FACTORS  [ ] Fracture of the hip, pelvis, or leg                       (5 Points)  [ ] Spinal cord injury resulting in paralysis             (5 points)       (in the previous month)    [ ] Paralysis  (less than 1 month)                             (5 Points)  [ ] Multiple Trauma within 1 month                        (5 Points)    Total Score [   6     ]    Caprini Score 0-2: Low Risk, NO VTE prophylaxis required for most patients, encourage ambulation  Caprini Score 3-6: Moderate Risk , pharmacologic VTE prophylaxis is indicated for most patients (in the absence of contraindications)  Caprini Score Greater than or =7: High risk, pharmocologic VTE prophylaxis indicated for most patients (in the absence of contraindications)

## 2024-05-20 NOTE — H&P PST ADULT - HISTORY OF PRESENT ILLNESS
Pt. states that he was recently told he needed this procedure, states he had a bad cough that would not resolve, he went to an urgent care and he was sent to [facility] ER and he was then told that he needs a valve replacement, he was in CHF, and was treated at the hospital and he was sent home 4/20/24. Denies CP or SOB. Denies leg edema, or palpitations. History of HTN, type 2 DM.  84 y/o male presents today to [facility] pending TAVR on 6/7/24 with Dr. Guillen secondary to nonrheumatic aortic valve stenosis. Pt. states that he was recently told he needed this procedure, states he had a bad cough that would not resolve, he went to an urgent care and he was sent to [facility] ER and he was then told that he needs a valve replacement, he was in CHF, and was treated at the hospital and he was sent home 4/20/24. Denies CP or SOB. Denies leg edema, or palpitations. History of HTN, HLD and type 2 DM. 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

## 2024-05-20 NOTE — H&P PST ADULT - OTHER CARE PROVIDERS
[facility], PCP [address] [phone] [facility] [phone], PCP [address] [phone]

## 2024-05-20 NOTE — H&P PST ADULT - NSHP PST DIAGOTHER LIST_GEN_A_CORE
5/20/24 17:04 Email sent to surgical team Sage Luna, Carolyn Davidson, Saray Cornejo re. CBC as documented to advise on preop iron infusion. CBC and UA noted as documented and have been faxed to pcp, all remaining labs as ordered pending. Rusty BADILLO, FNP-BC

## 2024-05-20 NOTE — H&P PST ADULT - EKG AND INTERPRETATION
EKG sinus rhythm with sinus arrythmia 72 bpm ekg faxed to PCP, pt. aware to f/u with pcp/cardio re. EKG

## 2024-05-21 PROBLEM — Z92.29 PERSONAL HISTORY OF OTHER DRUG THERAPY: Chronic | Status: ACTIVE | Noted: 2024-05-20

## 2024-05-21 PROBLEM — I35.0 NONRHEUMATIC AORTIC (VALVE) STENOSIS: Chronic | Status: ACTIVE | Noted: 2024-05-20

## 2024-05-21 PROBLEM — F41.9 ANXIETY DISORDER, UNSPECIFIED: Chronic | Status: ACTIVE | Noted: 2024-05-20

## 2024-05-21 PROBLEM — Z86.79 PERSONAL HISTORY OF OTHER DISEASES OF THE CIRCULATORY SYSTEM: Chronic | Status: ACTIVE | Noted: 2024-05-20

## 2024-05-21 LAB
MRSA PCR RESULT.: SIGNIFICANT CHANGE UP
S AUREUS DNA NOSE QL NAA+PROBE: SIGNIFICANT CHANGE UP

## 2024-05-22 LAB
CULTURE RESULTS: SIGNIFICANT CHANGE UP
SPECIMEN SOURCE: SIGNIFICANT CHANGE UP

## 2024-06-05 ENCOUNTER — APPOINTMENT (OUTPATIENT)
Dept: FAMILY MEDICINE | Facility: CLINIC | Age: 84
End: 2024-06-05
Payer: MEDICARE

## 2024-06-05 VITALS
OXYGEN SATURATION: 95 % | HEIGHT: 72 IN | BODY MASS INDEX: 20.32 KG/M2 | WEIGHT: 150 LBS | SYSTOLIC BLOOD PRESSURE: 142 MMHG | HEART RATE: 97 BPM | DIASTOLIC BLOOD PRESSURE: 74 MMHG

## 2024-06-05 DIAGNOSIS — E11.9 TYPE 2 DIABETES MELLITUS W/OUT COMPLICATIONS: ICD-10-CM

## 2024-06-05 DIAGNOSIS — Z01.818 ENCOUNTER FOR OTHER PREPROCEDURAL EXAMINATION: ICD-10-CM

## 2024-06-05 DIAGNOSIS — R05.2 SUBACUTE COUGH: ICD-10-CM

## 2024-06-05 DIAGNOSIS — F41.9 ANXIETY DISORDER, UNSPECIFIED: ICD-10-CM

## 2024-06-05 DIAGNOSIS — I50.30 UNSPECIFIED DIASTOLIC (CONGESTIVE) HEART FAILURE: ICD-10-CM

## 2024-06-05 DIAGNOSIS — I10 ESSENTIAL (PRIMARY) HYPERTENSION: ICD-10-CM

## 2024-06-05 DIAGNOSIS — E78.5 HYPERLIPIDEMIA, UNSPECIFIED: ICD-10-CM

## 2024-06-05 DIAGNOSIS — E87.5 HYPERKALEMIA: ICD-10-CM

## 2024-06-05 LAB — POTASSIUM SERPL-SCNC: 4.7 MMOL/L

## 2024-06-05 PROCEDURE — 99215 OFFICE O/P EST HI 40 MIN: CPT

## 2024-06-05 RX ORDER — FLUTICASONE PROPIONATE 50 UG/1
50 SPRAY, METERED NASAL TWICE DAILY
Qty: 1 | Refills: 3 | Status: DISCONTINUED | COMMUNITY
Start: 2024-02-21 | End: 2024-06-05

## 2024-06-06 NOTE — PATIENT PROFILE ADULT - FALL HARM RISK - HARM RISK INTERVENTIONS

## 2024-06-06 NOTE — RESULTS/DATA
[] : results reviewed [de-identified] : wnl [de-identified] : wnl [de-identified] : hyperkalemia, repeated and now wnl [de-identified] : No acute findings [de-identified] : see cardiac testing section above [de-identified] : A1c 6.7% Thyroid panel unremarkable (normal TSH, T4, mild T3 decrease) MSSA/MRSA negative UA/UCx negative

## 2024-06-06 NOTE — HISTORY OF PRESENT ILLNESS
[Aortic Stenosis] : aortic stenosis [No Adverse Anesthesia Reaction] : no adverse anesthesia reaction in self or family member [Diabetes] : diabetes [Moderate (4-6 METs)] : Moderate (4-6 METs) [Family Member] : family member [No Pertinent Pulmonary History] : no history of asthma, COPD, sleep apnea, or smoking [Recent Myocardial Infarction] : no recent myocardial infarction [Chronic Anticoagulation] : no chronic anticoagulation [Chronic Kidney Disease] : no chronic kidney disease [FreeTextEntry1] : Transcatheter Aortic Valve Replacement [FreeTextEntry2] : 06/07/24 [FreeTextEntry3] : Dr. Adriano Guillen [FreeTextEntry4] : Mr. JERMAN MCKEON is an 83-year-old male with a pmh of severe aortic stenosis, HFpEF, DM, HTN, HLD, T2DM and anxiety disorder who presents for preoperative medical clearance for above stated procedure. Patient is doing well, overall.  PST labs show mildly elevated potassium, this will be repeated stat.  [FreeTextEntry6] : Patient denies chest pain, claudication, orthopnea, palpitations or syncope. He has mild ROGERS due to underlying cardiac pathology. [FreeTextEntry7] : EKG (5/20/2024): Sinus rhythm, marked sinus arrythmia, leaf axis deviation, abnormal EKG TTE (04/14/2024): Normal LV systolic function with EF 55 to 60%. Increase LV mass and eccentric Hypertrophy. Normal RV, normal RV thickness, Normal function. Low flow, low gradient AS.

## 2024-06-07 ENCOUNTER — RESULT REVIEW (OUTPATIENT)
Age: 84
End: 2024-06-07

## 2024-06-07 ENCOUNTER — TRANSCRIPTION ENCOUNTER (OUTPATIENT)
Age: 84
End: 2024-06-07

## 2024-06-07 ENCOUNTER — INPATIENT (INPATIENT)
Facility: HOSPITAL | Age: 84
LOS: 0 days | Discharge: HOME HEALTH | DRG: 266 | End: 2024-06-08
Attending: THORACIC SURGERY (CARDIOTHORACIC VASCULAR SURGERY) | Admitting: THORACIC SURGERY (CARDIOTHORACIC VASCULAR SURGERY)
Payer: MEDICARE

## 2024-06-07 ENCOUNTER — APPOINTMENT (OUTPATIENT)
Dept: CARDIOTHORACIC SURGERY | Facility: HOSPITAL | Age: 84
End: 2024-06-07

## 2024-06-07 ENCOUNTER — APPOINTMENT (OUTPATIENT)
Dept: CARDIOTHORACIC SURGERY | Facility: CLINIC | Age: 84
End: 2024-06-07
Payer: MEDICARE

## 2024-06-07 VITALS
OXYGEN SATURATION: 100 % | RESPIRATION RATE: 16 BRPM | SYSTOLIC BLOOD PRESSURE: 145 MMHG | HEIGHT: 72 IN | TEMPERATURE: 98 F | DIASTOLIC BLOOD PRESSURE: 64 MMHG | HEART RATE: 55 BPM | WEIGHT: 147.71 LBS

## 2024-06-07 DIAGNOSIS — I35.0 NONRHEUMATIC AORTIC (VALVE) STENOSIS: ICD-10-CM

## 2024-06-07 LAB
ABO RH CONFIRMATION: SIGNIFICANT CHANGE UP
ALBUMIN SERPL ELPH-MCNC: 3.3 G/DL — SIGNIFICANT CHANGE UP (ref 3.3–5.2)
ALP SERPL-CCNC: 45 U/L — SIGNIFICANT CHANGE UP (ref 40–120)
ALT FLD-CCNC: 8 U/L — SIGNIFICANT CHANGE UP
ANION GAP SERPL CALC-SCNC: 10 MMOL/L — SIGNIFICANT CHANGE UP (ref 5–17)
APTT BLD: 33.6 SEC — SIGNIFICANT CHANGE UP (ref 24.5–35.6)
AST SERPL-CCNC: 18 U/L — SIGNIFICANT CHANGE UP
BILIRUB SERPL-MCNC: 0.3 MG/DL — LOW (ref 0.4–2)
BUN SERPL-MCNC: 18.6 MG/DL — SIGNIFICANT CHANGE UP (ref 8–20)
CALCIUM SERPL-MCNC: 8.8 MG/DL — SIGNIFICANT CHANGE UP (ref 8.4–10.5)
CHLORIDE SERPL-SCNC: 109 MMOL/L — HIGH (ref 96–108)
CO2 SERPL-SCNC: 20 MMOL/L — LOW (ref 22–29)
CREAT SERPL-MCNC: 0.82 MG/DL — SIGNIFICANT CHANGE UP (ref 0.5–1.3)
EGFR: 87 ML/MIN/1.73M2 — SIGNIFICANT CHANGE UP
GAS PNL BLDA: SIGNIFICANT CHANGE UP
GLUCOSE BLDC GLUCOMTR-MCNC: 108 MG/DL — HIGH (ref 70–99)
GLUCOSE BLDC GLUCOMTR-MCNC: 123 MG/DL — HIGH (ref 70–99)
GLUCOSE BLDC GLUCOMTR-MCNC: 97 MG/DL — SIGNIFICANT CHANGE UP (ref 70–99)
GLUCOSE SERPL-MCNC: 125 MG/DL — HIGH (ref 70–99)
HCT VFR BLD CALC: 25.6 % — LOW (ref 39–50)
HCT VFR BLD CALC: 26.4 % — LOW (ref 39–50)
HGB BLD-MCNC: 8.3 G/DL — LOW (ref 13–17)
HGB BLD-MCNC: 8.6 G/DL — LOW (ref 13–17)
INR BLD: 1.07 RATIO — SIGNIFICANT CHANGE UP (ref 0.85–1.18)
MAGNESIUM SERPL-MCNC: 1.1 MG/DL — LOW (ref 1.6–2.6)
MCHC RBC-ENTMCNC: 28.5 PG — SIGNIFICANT CHANGE UP (ref 27–34)
MCHC RBC-ENTMCNC: 28.7 PG — SIGNIFICANT CHANGE UP (ref 27–34)
MCHC RBC-ENTMCNC: 32.4 GM/DL — SIGNIFICANT CHANGE UP (ref 32–36)
MCHC RBC-ENTMCNC: 32.6 GM/DL — SIGNIFICANT CHANGE UP (ref 32–36)
MCV RBC AUTO: 87.4 FL — SIGNIFICANT CHANGE UP (ref 80–100)
MCV RBC AUTO: 88.6 FL — SIGNIFICANT CHANGE UP (ref 80–100)
PLATELET # BLD AUTO: 162 K/UL — SIGNIFICANT CHANGE UP (ref 150–400)
PLATELET # BLD AUTO: 165 K/UL — SIGNIFICANT CHANGE UP (ref 150–400)
POTASSIUM SERPL-MCNC: 4 MMOL/L — SIGNIFICANT CHANGE UP (ref 3.5–5.3)
POTASSIUM SERPL-SCNC: 4 MMOL/L — SIGNIFICANT CHANGE UP (ref 3.5–5.3)
PROT SERPL-MCNC: 4.9 G/DL — LOW (ref 6.6–8.7)
PROTHROM AB SERPL-ACNC: 11.8 SEC — SIGNIFICANT CHANGE UP (ref 9.5–13)
RBC # BLD: 2.89 M/UL — LOW (ref 4.2–5.8)
RBC # BLD: 3.02 M/UL — LOW (ref 4.2–5.8)
RBC # FLD: 15.3 % — HIGH (ref 10.3–14.5)
RBC # FLD: 15.5 % — HIGH (ref 10.3–14.5)
SODIUM SERPL-SCNC: 139 MMOL/L — SIGNIFICANT CHANGE UP (ref 135–145)
WBC # BLD: 5.65 K/UL — SIGNIFICANT CHANGE UP (ref 3.8–10.5)
WBC # BLD: 7.14 K/UL — SIGNIFICANT CHANGE UP (ref 3.8–10.5)
WBC # FLD AUTO: 5.65 K/UL — SIGNIFICANT CHANGE UP (ref 3.8–10.5)
WBC # FLD AUTO: 7.14 K/UL — SIGNIFICANT CHANGE UP (ref 3.8–10.5)

## 2024-06-07 PROCEDURE — 87641 MR-STAPH DNA AMP PROBE: CPT

## 2024-06-07 PROCEDURE — 80053 COMPREHEN METABOLIC PANEL: CPT

## 2024-06-07 PROCEDURE — 71046 X-RAY EXAM CHEST 2 VIEWS: CPT

## 2024-06-07 PROCEDURE — MCOT1: CPT | Mod: NC

## 2024-06-07 PROCEDURE — 83036 HEMOGLOBIN GLYCOSYLATED A1C: CPT

## 2024-06-07 PROCEDURE — 71045 X-RAY EXAM CHEST 1 VIEW: CPT | Mod: 26

## 2024-06-07 PROCEDURE — 85025 COMPLETE CBC W/AUTO DIFF WBC: CPT

## 2024-06-07 PROCEDURE — 84134 ASSAY OF PREALBUMIN: CPT

## 2024-06-07 PROCEDURE — 84443 ASSAY THYROID STIM HORMONE: CPT

## 2024-06-07 PROCEDURE — 83880 ASSAY OF NATRIURETIC PEPTIDE: CPT

## 2024-06-07 PROCEDURE — G0463: CPT

## 2024-06-07 PROCEDURE — 93355 ECHO TRANSESOPHAGEAL (TEE): CPT | Mod: 26

## 2024-06-07 PROCEDURE — 85610 PROTHROMBIN TIME: CPT

## 2024-06-07 PROCEDURE — 86901 BLOOD TYPING SEROLOGIC RH(D): CPT

## 2024-06-07 PROCEDURE — 33361 REPLACE AORTIC VALVE PERQ: CPT | Mod: 62,Q0

## 2024-06-07 PROCEDURE — 87086 URINE CULTURE/COLONY COUNT: CPT

## 2024-06-07 PROCEDURE — 36415 COLL VENOUS BLD VENIPUNCTURE: CPT

## 2024-06-07 PROCEDURE — 86850 RBC ANTIBODY SCREEN: CPT

## 2024-06-07 PROCEDURE — 85730 THROMBOPLASTIN TIME PARTIAL: CPT

## 2024-06-07 PROCEDURE — 86923 COMPATIBILITY TEST ELECTRIC: CPT

## 2024-06-07 PROCEDURE — 86900 BLOOD TYPING SEROLOGIC ABO: CPT

## 2024-06-07 PROCEDURE — 81003 URINALYSIS AUTO W/O SCOPE: CPT

## 2024-06-07 PROCEDURE — 99223 1ST HOSP IP/OBS HIGH 75: CPT

## 2024-06-07 PROCEDURE — 84480 ASSAY TRIIODOTHYRONINE (T3): CPT

## 2024-06-07 PROCEDURE — 87640 STAPH A DNA AMP PROBE: CPT

## 2024-06-07 PROCEDURE — 84436 ASSAY OF TOTAL THYROXINE: CPT

## 2024-06-07 PROCEDURE — 93005 ELECTROCARDIOGRAM TRACING: CPT

## 2024-06-07 PROCEDURE — 93010 ELECTROCARDIOGRAM REPORT: CPT | Mod: 76

## 2024-06-07 DEVICE — SYS DEL NAVITOR FLEXNAV LRG: Type: IMPLANTABLE DEVICE | Status: FUNCTIONAL

## 2024-06-07 DEVICE — SHEATH INTRO DRYSEAL FLEX 14FR 33CM: Type: IMPLANTABLE DEVICE | Status: FUNCTIONAL

## 2024-06-07 DEVICE — SYS LOAD NAVITOR FLEXNAV LRG: Type: IMPLANTABLE DEVICE | Status: FUNCTIONAL

## 2024-06-07 DEVICE — IMPLANTABLE DEVICE: Type: IMPLANTABLE DEVICE | Status: FUNCTIONAL

## 2024-06-07 RX ORDER — MAGNESIUM SULFATE 500 MG/ML
2 VIAL (ML) INJECTION ONCE
Refills: 0 | Status: COMPLETED | OUTPATIENT
Start: 2024-06-07 | End: 2024-06-07

## 2024-06-07 RX ORDER — SIMVASTATIN 20 MG/1
40 TABLET, FILM COATED ORAL AT BEDTIME
Refills: 0 | Status: DISCONTINUED | OUTPATIENT
Start: 2024-06-07 | End: 2024-06-08

## 2024-06-07 RX ORDER — LISINOPRIL 2.5 MG/1
1 TABLET ORAL
Refills: 0 | DISCHARGE

## 2024-06-07 RX ORDER — QUETIAPINE FUMARATE 200 MG/1
1 TABLET, FILM COATED ORAL
Refills: 0 | DISCHARGE

## 2024-06-07 RX ORDER — AMLODIPINE BESYLATE 2.5 MG/1
1 TABLET ORAL
Refills: 0 | DISCHARGE

## 2024-06-07 RX ORDER — GLIMEPIRIDE 1 MG
1 TABLET ORAL
Refills: 0 | DISCHARGE

## 2024-06-07 RX ORDER — PANTOPRAZOLE SODIUM 20 MG/1
40 TABLET, DELAYED RELEASE ORAL
Refills: 0 | Status: DISCONTINUED | OUTPATIENT
Start: 2024-06-07 | End: 2024-06-08

## 2024-06-07 RX ORDER — METFORMIN HYDROCHLORIDE 850 MG/1
1 TABLET ORAL
Qty: 0 | Refills: 0 | DISCHARGE

## 2024-06-07 RX ORDER — POTASSIUM CHLORIDE 20 MEQ
10 PACKET (EA) ORAL
Refills: 0 | Status: COMPLETED | OUTPATIENT
Start: 2024-06-07 | End: 2024-06-07

## 2024-06-07 RX ORDER — INSULIN LISPRO 100/ML
VIAL (ML) SUBCUTANEOUS
Refills: 0 | Status: DISCONTINUED | OUTPATIENT
Start: 2024-06-07 | End: 2024-06-08

## 2024-06-07 RX ORDER — CEFUROXIME AXETIL 250 MG
1500 TABLET ORAL EVERY 8 HOURS
Refills: 0 | Status: COMPLETED | OUTPATIENT
Start: 2024-06-07 | End: 2024-06-08

## 2024-06-07 RX ORDER — ASPIRIN/CALCIUM CARB/MAGNESIUM 324 MG
81 TABLET ORAL DAILY
Refills: 0 | Status: DISCONTINUED | OUTPATIENT
Start: 2024-06-07 | End: 2024-06-08

## 2024-06-07 RX ORDER — ACETAMINOPHEN 500 MG
1000 TABLET ORAL ONCE
Refills: 0 | Status: COMPLETED | OUTPATIENT
Start: 2024-06-07 | End: 2024-06-07

## 2024-06-07 RX ORDER — SODIUM CHLORIDE 9 MG/ML
3 INJECTION INTRAMUSCULAR; INTRAVENOUS; SUBCUTANEOUS EVERY 8 HOURS
Refills: 0 | Status: DISCONTINUED | OUTPATIENT
Start: 2024-06-07 | End: 2024-06-07

## 2024-06-07 RX ORDER — SODIUM CHLORIDE 9 MG/ML
1000 INJECTION, SOLUTION INTRAVENOUS
Refills: 0 | Status: DISCONTINUED | OUTPATIENT
Start: 2024-06-07 | End: 2024-06-08

## 2024-06-07 RX ORDER — GLUCAGON INJECTION, SOLUTION 0.5 MG/.1ML
1 INJECTION, SOLUTION SUBCUTANEOUS ONCE
Refills: 0 | Status: DISCONTINUED | OUTPATIENT
Start: 2024-06-07 | End: 2024-06-08

## 2024-06-07 RX ORDER — ALLOPURINOL 300 MG
0 TABLET ORAL
Refills: 0 | DISCHARGE

## 2024-06-07 RX ORDER — SODIUM CHLORIDE 9 MG/ML
500 INJECTION, SOLUTION INTRAVENOUS ONCE
Refills: 0 | Status: COMPLETED | OUTPATIENT
Start: 2024-06-07 | End: 2024-06-07

## 2024-06-07 RX ORDER — SITAGLIPTIN 50 MG/1
1 TABLET, FILM COATED ORAL
Refills: 0 | DISCHARGE

## 2024-06-07 RX ORDER — DEXTROSE 50 % IN WATER 50 %
12.5 SYRINGE (ML) INTRAVENOUS ONCE
Refills: 0 | Status: DISCONTINUED | OUTPATIENT
Start: 2024-06-07 | End: 2024-06-08

## 2024-06-07 RX ORDER — DEXTROSE 50 % IN WATER 50 %
15 SYRINGE (ML) INTRAVENOUS ONCE
Refills: 0 | Status: DISCONTINUED | OUTPATIENT
Start: 2024-06-07 | End: 2024-06-08

## 2024-06-07 RX ORDER — PANTOPRAZOLE SODIUM 20 MG/1
40 TABLET, DELAYED RELEASE ORAL ONCE
Refills: 0 | Status: COMPLETED | OUTPATIENT
Start: 2024-06-07 | End: 2024-06-07

## 2024-06-07 RX ORDER — SIMVASTATIN 20 MG/1
1 TABLET, FILM COATED ORAL
Refills: 0 | DISCHARGE

## 2024-06-07 RX ORDER — DEXTROSE 10 % IN WATER 10 %
125 INTRAVENOUS SOLUTION INTRAVENOUS ONCE
Refills: 0 | Status: DISCONTINUED | OUTPATIENT
Start: 2024-06-07 | End: 2024-06-08

## 2024-06-07 RX ORDER — SODIUM CHLORIDE 9 MG/ML
1000 INJECTION INTRAMUSCULAR; INTRAVENOUS; SUBCUTANEOUS
Refills: 0 | Status: DISCONTINUED | OUTPATIENT
Start: 2024-06-07 | End: 2024-06-08

## 2024-06-07 RX ORDER — DEXTROSE 50 % IN WATER 50 %
25 SYRINGE (ML) INTRAVENOUS ONCE
Refills: 0 | Status: DISCONTINUED | OUTPATIENT
Start: 2024-06-07 | End: 2024-06-08

## 2024-06-07 RX ADMIN — SODIUM CHLORIDE 1000 MILLILITER(S): 9 INJECTION, SOLUTION INTRAVENOUS at 16:05

## 2024-06-07 RX ADMIN — Medication 100 MILLIGRAM(S): at 22:10

## 2024-06-07 RX ADMIN — Medication 100 MILLIEQUIVALENT(S): at 17:06

## 2024-06-07 RX ADMIN — SIMVASTATIN 40 MILLIGRAM(S): 20 TABLET, FILM COATED ORAL at 22:10

## 2024-06-07 RX ADMIN — Medication 25 GRAM(S): at 17:06

## 2024-06-07 RX ADMIN — Medication 100 MILLIEQUIVALENT(S): at 18:20

## 2024-06-07 RX ADMIN — Medication 400 MILLIGRAM(S): at 18:36

## 2024-06-07 RX ADMIN — Medication 1000 MILLIGRAM(S): at 19:00

## 2024-06-07 RX ADMIN — PANTOPRAZOLE SODIUM 40 MILLIGRAM(S): 20 TABLET, DELAYED RELEASE ORAL at 16:05

## 2024-06-07 NOTE — BRIEF OPERATIVE NOTE - COMMENTS
[employer] Representative: Pino Diallo (clinical support)  Invasive Lines: Left Radial Arterial Line  IV Medication Infusions: Levophed Used

## 2024-06-07 NOTE — CHART NOTE - NSCHARTNOTEFT_GEN_A_CORE
Commercial 27mm [employer] Navitor Vision Percutaneous Transfemoral TAVR via Right Common Femoral Artery.  NCT# [id number], STS/ACC TVT Registry Patient ID# [id number].

## 2024-06-07 NOTE — ASU PREOP CHECKLIST - NEEDLE GAUGE
Acknowledgement of Current Treatment Plan     I have reviewed my treatment plan with my therapist / counselor on 1/17/24. I agree with the plan as it is written in the electronic health record, and I have had input into the goals and strategies.       Client Name:   Ceci Michele   Signature:  _______________________________  Date:  ________ Time: __________     Name of Therapist or Counselor:  Franco REYNOLDS                 Date: January 17, 2024   Time: 10:17 AM        
Service Type:  Individual Therapy Session      Session Start Time: 11:20  Session End Time: 11:55     Session Length: 35 minutes    Attendees:  Patient    Service Modality:  In-person     Interactive Complexity: No    Data: Met with client 35 minutes. Client talked about schools and wanting to talk about starting at [employer] post treatment. She talked about self esteem and it sometimes comes from inside not always externally.She identified that and that it is sometimes harder to find it internally. Talked some about family and communication . Client continues to maintain reluctance to work on improving family relationship as far as working through resentments, trust. Talked about creating a life worth living and gave next assignments. Client identifying depression 6/10 and self esteem 6/10 asked if she did not have a boyfriend self esteem would be 4/10    Interventions:  facilitated session, asked clarifying questions, and reflective listening    Assessment:  Client seems reluctant to work on family relationships. She remains guarded when talking about the topic .She seem to try to remain superficial in interactions. She does identify low self esteem She relate her self esteem to mostly external factors.     Client response:  Seems to still be guarded in discussions .     Plan:  Continue per Master Treatment Plan     
20

## 2024-06-07 NOTE — BRIEF OPERATIVE NOTE - NSICDXBRIEFPREOP_GEN_ALL_CORE_FT
PRE-OP DIAGNOSIS:  Severe aortic stenosis 07-Jun-2024 15:30:02  Donavan Gaines  Chronic diastolic CHF (congestive heart failure), NYHA class 3 07-Jun-2024 15:30:11  Donavan Gaines

## 2024-06-07 NOTE — CONSULT NOTE ADULT - NS ATTEND AMEND GEN_ALL_CORE FT
new LBBB following TAVR, and sinus bradycardia. No symptomatic bradycardia or high grade AV block noted.   There is risk of progression to CHB, which would warrant ppm implant. plan to monitor on telemetry, and if no indication for acute pacemaker implantation, discharge with 30 day MCOT for close surveillance.   Brief episodes of pAT noted, there is also a risk that he will be found to have paroxysmal AF, in which case anticoagulation would be indicated.

## 2024-06-07 NOTE — BRIEF OPERATIVE NOTE - NSICDXBRIEFPOSTOP_GEN_ALL_CORE_FT
POST-OP DIAGNOSIS:  Severe aortic stenosis 07-Jun-2024 15:30:26  Donavan Gaines  Chronic diastolic CHF (congestive heart failure), NYHA class 3 07-Jun-2024 15:30:36  Donavan Gaines  Complete heart block 07-Jun-2024 15:31:13  Donavan Gaines  Left bundle branch block 07-Jun-2024 15:31:31  Donavan Gaines   POST-OP DIAGNOSIS:  Severe aortic stenosis 07-Jun-2024 15:30:26  Donavan Gaines  Complete heart block 07-Jun-2024 15:31:13  Donavan Gaines  Left bundle branch block 07-Jun-2024 15:31:31  Donavan Gaines  Acute on chronic diastolic CHF (congestive heart failure), NYHA class 3 07-Jun-2024 15:51:47  Donavan Gaines

## 2024-06-07 NOTE — BRIEF OPERATIVE NOTE - OPERATION/FINDINGS
Post deployment trace PVL (mean gradient 9 mmHg) with brief complete heart block that resolved to a sinus rhythm with a new left bundle branch block.   Beginning of case LVEDP 28 mmHg consistent with acute on chronic diastolic CHF. Post deployment trace PVL (mean gradient 9 mmHg) with brief complete heart block that resolved to a sinus rhythm with a new left bundle branch block. End of procedure CXR with mild pulmonary congestion however unable to treat with IV diuresis at this time given systolic blood pressure in the 90's. Will further evaluate in the stepdown unit for appropriate timing of diuresis.

## 2024-06-07 NOTE — BRIEF OPERATIVE NOTE - NSICDXBRIEFPROCEDURE_GEN_ALL_CORE_FT
PROCEDURES:  TAVR, percutaneous 07-Jun-2024 15:31:39 Percutaneous Transfemoral TAVR via Right Common Femoral Artery (27mm Navitor Vision) (NCT# [id number]) (STS/ACC TVT Registry Patient ID# [id number]) Donavan Gaines

## 2024-06-08 ENCOUNTER — TRANSCRIPTION ENCOUNTER (OUTPATIENT)
Age: 84
End: 2024-06-08

## 2024-06-08 VITALS
DIASTOLIC BLOOD PRESSURE: 76 MMHG | OXYGEN SATURATION: 97 % | SYSTOLIC BLOOD PRESSURE: 130 MMHG | HEART RATE: 94 BPM | RESPIRATION RATE: 18 BRPM | TEMPERATURE: 98 F

## 2024-06-08 DIAGNOSIS — I44.7 LEFT BUNDLE-BRANCH BLOCK, UNSPECIFIED: ICD-10-CM

## 2024-06-08 DIAGNOSIS — R13.19 OTHER DYSPHAGIA: ICD-10-CM

## 2024-06-08 LAB
ALBUMIN SERPL ELPH-MCNC: 3 G/DL — LOW (ref 3.3–5.2)
ALP SERPL-CCNC: 43 U/L — SIGNIFICANT CHANGE UP (ref 40–120)
ALT FLD-CCNC: 8 U/L — SIGNIFICANT CHANGE UP
ANION GAP SERPL CALC-SCNC: 13 MMOL/L — SIGNIFICANT CHANGE UP (ref 5–17)
APTT BLD: 25.1 SEC — SIGNIFICANT CHANGE UP (ref 24.5–35.6)
AST SERPL-CCNC: 18 U/L — SIGNIFICANT CHANGE UP
BILIRUB SERPL-MCNC: 0.4 MG/DL — SIGNIFICANT CHANGE UP (ref 0.4–2)
BUN SERPL-MCNC: 15 MG/DL — SIGNIFICANT CHANGE UP (ref 8–20)
CALCIUM SERPL-MCNC: 7 MG/DL — LOW (ref 8.4–10.5)
CHLORIDE SERPL-SCNC: 110 MMOL/L — HIGH (ref 96–108)
CO2 SERPL-SCNC: 16 MMOL/L — LOW (ref 22–29)
CREAT SERPL-MCNC: 0.69 MG/DL — SIGNIFICANT CHANGE UP (ref 0.5–1.3)
EGFR: 91 ML/MIN/1.73M2 — SIGNIFICANT CHANGE UP
GLUCOSE BLDC GLUCOMTR-MCNC: 250 MG/DL — HIGH (ref 70–99)
GLUCOSE BLDC GLUCOMTR-MCNC: 305 MG/DL — HIGH (ref 70–99)
GLUCOSE SERPL-MCNC: 107 MG/DL — HIGH (ref 70–99)
HCT VFR BLD CALC: 26.1 % — LOW (ref 39–50)
HGB BLD-MCNC: 8.6 G/DL — LOW (ref 13–17)
INR BLD: 1.19 RATIO — HIGH (ref 0.85–1.18)
MAGNESIUM SERPL-MCNC: 1.1 MG/DL — LOW (ref 1.6–2.6)
MCHC RBC-ENTMCNC: 28.5 PG — SIGNIFICANT CHANGE UP (ref 27–34)
MCHC RBC-ENTMCNC: 33 GM/DL — SIGNIFICANT CHANGE UP (ref 32–36)
MCV RBC AUTO: 86.4 FL — SIGNIFICANT CHANGE UP (ref 80–100)
PLATELET # BLD AUTO: 140 K/UL — LOW (ref 150–400)
POTASSIUM SERPL-MCNC: 3.4 MMOL/L — LOW (ref 3.5–5.3)
POTASSIUM SERPL-SCNC: 3.4 MMOL/L — LOW (ref 3.5–5.3)
PROT SERPL-MCNC: 4.4 G/DL — LOW (ref 6.6–8.7)
PROTHROM AB SERPL-ACNC: 13.1 SEC — HIGH (ref 9.5–13)
RBC # BLD: 3.02 M/UL — LOW (ref 4.2–5.8)
RBC # FLD: 15.1 % — HIGH (ref 10.3–14.5)
SODIUM SERPL-SCNC: 139 MMOL/L — SIGNIFICANT CHANGE UP (ref 135–145)
WBC # BLD: 6.59 K/UL — SIGNIFICANT CHANGE UP (ref 3.8–10.5)
WBC # FLD AUTO: 6.59 K/UL — SIGNIFICANT CHANGE UP (ref 3.8–10.5)

## 2024-06-08 PROCEDURE — 85027 COMPLETE CBC AUTOMATED: CPT

## 2024-06-08 PROCEDURE — 82803 BLOOD GASES ANY COMBINATION: CPT

## 2024-06-08 PROCEDURE — 93010 ELECTROCARDIOGRAM REPORT: CPT

## 2024-06-08 PROCEDURE — 93308 TTE F-UP OR LMTD: CPT

## 2024-06-08 PROCEDURE — C8929: CPT

## 2024-06-08 PROCEDURE — 36415 COLL VENOUS BLD VENIPUNCTURE: CPT

## 2024-06-08 PROCEDURE — 82947 ASSAY GLUCOSE BLOOD QUANT: CPT

## 2024-06-08 PROCEDURE — 71045 X-RAY EXAM CHEST 1 VIEW: CPT | Mod: 26

## 2024-06-08 PROCEDURE — C1887: CPT

## 2024-06-08 PROCEDURE — 82435 ASSAY OF BLOOD CHLORIDE: CPT

## 2024-06-08 PROCEDURE — 83735 ASSAY OF MAGNESIUM: CPT

## 2024-06-08 PROCEDURE — C1760: CPT

## 2024-06-08 PROCEDURE — 85014 HEMATOCRIT: CPT

## 2024-06-08 PROCEDURE — 93321 DOPPLER ECHO F-UP/LMTD STD: CPT

## 2024-06-08 PROCEDURE — 85610 PROTHROMBIN TIME: CPT

## 2024-06-08 PROCEDURE — 82962 GLUCOSE BLOOD TEST: CPT

## 2024-06-08 PROCEDURE — C1769: CPT

## 2024-06-08 PROCEDURE — 85018 HEMOGLOBIN: CPT

## 2024-06-08 PROCEDURE — 85730 THROMBOPLASTIN TIME PARTIAL: CPT

## 2024-06-08 PROCEDURE — C1894: CPT

## 2024-06-08 PROCEDURE — L8699: CPT

## 2024-06-08 PROCEDURE — 71045 X-RAY EXAM CHEST 1 VIEW: CPT

## 2024-06-08 PROCEDURE — 84132 ASSAY OF SERUM POTASSIUM: CPT

## 2024-06-08 PROCEDURE — 80053 COMPREHEN METABOLIC PANEL: CPT

## 2024-06-08 PROCEDURE — 83605 ASSAY OF LACTIC ACID: CPT

## 2024-06-08 PROCEDURE — 84295 ASSAY OF SERUM SODIUM: CPT

## 2024-06-08 PROCEDURE — C1725: CPT

## 2024-06-08 PROCEDURE — 93355 ECHO TRANSESOPHAGEAL (TEE): CPT

## 2024-06-08 PROCEDURE — 76000 FLUOROSCOPY <1 HR PHYS/QHP: CPT

## 2024-06-08 PROCEDURE — 93325 DOPPLER ECHO COLOR FLOW MAPG: CPT

## 2024-06-08 PROCEDURE — C1889: CPT

## 2024-06-08 PROCEDURE — 99233 SBSQ HOSP IP/OBS HIGH 50: CPT

## 2024-06-08 PROCEDURE — 82330 ASSAY OF CALCIUM: CPT

## 2024-06-08 PROCEDURE — 93005 ELECTROCARDIOGRAM TRACING: CPT

## 2024-06-08 RX ORDER — PANTOPRAZOLE SODIUM 20 MG/1
1 TABLET, DELAYED RELEASE ORAL
Qty: 14 | Refills: 0
Start: 2024-06-08 | End: 2024-06-21

## 2024-06-08 RX ORDER — ACETAMINOPHEN 500 MG
1000 TABLET ORAL ONCE
Refills: 0 | Status: COMPLETED | OUTPATIENT
Start: 2024-06-08 | End: 2024-06-08

## 2024-06-08 RX ORDER — MAGNESIUM SULFATE 500 MG/ML
2 VIAL (ML) INJECTION ONCE
Refills: 0 | Status: COMPLETED | OUTPATIENT
Start: 2024-06-08 | End: 2024-06-08

## 2024-06-08 RX ORDER — ASPIRIN/CALCIUM CARB/MAGNESIUM 324 MG
1 TABLET ORAL
Qty: 0 | Refills: 0 | DISCHARGE
Start: 2024-06-08

## 2024-06-08 RX ORDER — POTASSIUM CHLORIDE 20 MEQ
40 PACKET (EA) ORAL ONCE
Refills: 0 | Status: COMPLETED | OUTPATIENT
Start: 2024-06-08 | End: 2024-06-08

## 2024-06-08 RX ORDER — METOPROLOL TARTRATE 50 MG
1 TABLET ORAL
Refills: 0 | DISCHARGE

## 2024-06-08 RX ADMIN — Medication 4: at 12:40

## 2024-06-08 RX ADMIN — Medication 8: at 09:06

## 2024-06-08 RX ADMIN — Medication 40 MILLIEQUIVALENT(S): at 09:06

## 2024-06-08 RX ADMIN — Medication 1000 MILLIGRAM(S): at 04:00

## 2024-06-08 RX ADMIN — Medication 81 MILLIGRAM(S): at 12:39

## 2024-06-08 RX ADMIN — PANTOPRAZOLE SODIUM 40 MILLIGRAM(S): 20 TABLET, DELAYED RELEASE ORAL at 05:44

## 2024-06-08 RX ADMIN — Medication 100 MILLIGRAM(S): at 12:39

## 2024-06-08 RX ADMIN — Medication 400 MILLIGRAM(S): at 03:31

## 2024-06-08 RX ADMIN — Medication 100 MILLIGRAM(S): at 05:44

## 2024-06-08 RX ADMIN — Medication 25 GRAM(S): at 06:40

## 2024-06-08 NOTE — DISCHARGE NOTE PROVIDER - NSDCPNSUBOBJ_GEN_ALL_CORE
Subjective: Pt. seen & examined, NAD noted. Family at the bedside. Pt. denies any SOB or chest pain,     VITAL SIGNS  Vital Signs Last 24 Hrs  T(C): 37.2 (06-08-24 @ 08:00), Max: 37.2 (06-08-24 @ 08:00)  T(F): 98.9 (06-08-24 @ 08:00), Max: 98.9 (06-08-24 @ 08:00)  HR: 91 (06-08-24 @ 08:00) (40 - 94)  BP: 131/56 (06-08-24 @ 08:00) (94/46 - 158/76)  RR: 18 (06-08-24 @ 08:00) (12 - 20)  SpO2: 97% (06-08-24 @ 08:00) (94% - 100%)  on (O2)              Telemetry:  Sinus rhythm  LVEF: 55%    MEDICATIONS  aspirin  chewable 81 milliGRAM(s) Oral daily  cefuroxime  IVPB 1500 milliGRAM(s) IV Intermittent once  dextrose 10% Bolus 125 milliLiter(s) IV Bolus once  dextrose 5%. 1000 milliLiter(s) IV Continuous <Continuous>  dextrose 5%. 1000 milliLiter(s) IV Continuous <Continuous>  dextrose 50% Injectable 25 Gram(s) IV Push once  dextrose 50% Injectable 12.5 Gram(s) IV Push once  dextrose Oral Gel 15 Gram(s) Oral once PRN  glucagon  Injectable 1 milliGRAM(s) IntraMuscular once  insulin lispro (ADMELOG) corrective regimen sliding scale   SubCutaneous Before meals and at bedtime  pantoprazole    Tablet 40 milliGRAM(s) Oral before breakfast  simvastatin 40 milliGRAM(s) Oral at bedtime      PHYSICAL EXAM  General: no acute distress  Neurology: alert and oriented x 3, nonfocal, no gross deficits  Respiratory: clear to auscultation bilaterally  CV: regular rate and rhythm, normal S1, S2  Abdomen: soft, nontender, nondistended, positive bowel sounds  Extremities: warm, well perfused. no edema. + DP pulses  Incisions: Bilateral femoral groins CDI & JOAQUIN no hematoma noted       I&O's Detail    07 Jun 2024 07:01  -  08 Jun 2024 07:00  --------------------------------------------------------  IN:    IV PiggyBack: 50 mL    IV PiggyBack: 200 mL    IV PiggyBack: 50 mL    Lactated Ringers Bolus: 500 mL    sodium chloride 0.9%: 110 mL  Total IN: 910 mL    OUT:    Voided (mL): 1675 mL  Total OUT: 1675 mL    Total NET: -765 mL          Weights:  Daily     Daily   Admit Wt: Drug Dosing Weight  Height (cm): 182.9 (07 Jun 2024 11:39)  Weight (kg): 68.5 (08 Jun 2024 05:55)  BMI (kg/m2): 20.5 (08 Jun 2024 05:55)  BSA (m2): 1.89 (08 Jun 2024 05:55)    LABS  06-08    139  |  110<H>  |  15.0  ----------------------------<  107<H>  3.4<L>   |  16.0<L>  |  0.69    Ca    7.0<L>      08 Jun 2024 03:45  Mg     1.1     06-08    TPro  4.4<L>  /  Alb  3.0<L>  /  TBili  0.4  /  DBili  x   /  AST  18  /  ALT  8   /  AlkPhos  43  06-08                                 8.6    6.59  )-----------( 140      ( 08 Jun 2024 03:45 )             26.1          PT/INR - ( 08 Jun 2024 03:45 )   PT: 13.1 sec;   INR: 1.19 ratio         PTT - ( 08 Jun 2024 03:45 )  PTT:25.1 sec      Bilirubin Total: 0.4 mg/dL (06-08 @ 03:45)  Bilirubin Total: 0.3 mg/dL (06-07 @ 15:15)    CAPILLARY BLOOD GLUCOSE      POCT Blood Glucose.: 250 mg/dL (08 Jun 2024 12:13)  POCT Blood Glucose.: 305 mg/dL (08 Jun 2024 08:36)  POCT Blood Glucose.: 108 mg/dL (07 Jun 2024 21:25)  POCT Blood Glucose.: 123 mg/dL (07 Jun 2024 17:18)           CXR:< from: Xray Chest 1 View- PORTABLE-Routine (Xray Chest 1 View- PORTABLE-Routine in AM.) (06.08.24 @ 06:38) >    ACC: [id number] EXAM:  XR CHEST PORTABLE ROUTINE 1V   ORDERED BY: SHARLENE ZAPATA     ACC: [id number] EXAM:  XR CHEST AP OR PA 1V   ORDERED BY: ALLEN ASCENCIO     PROCEDURE DATE:  06/07/2024          INTERPRETATION:  HISTORY: Admitting Dxs: I35.0 NONRHEUMATIC AORTIC   (VALVE) ST; ; OR;  TECHNIQUE: Serial portable chest  x-rays, 2 studies.  COMPARISON: 5/20/2024.  FINDINGS/  IMPRESSION:    First study is from 6/7, 2 films, and shows    HEART: enlarged Patient is status post TAVR.  LUNGS: Mild interstitial edema without lobar consolidation effusion or   pneumothorax  BONES: degenerative changes.    The follow-up is from today and shows resolution of the mild interstitial   edema...    --- End of Report ---      TITO GUTIERREZ MD; Attending Interventional Radiologist  This document has been electronically signed. Jun 8 2024  6:55AM  < from: TTE W or WO Ultrasound Enhancing Agent (06.07.24 @ 18:58) >    CONCLUSIONS:      1. Technically difficult image quality.   2. Left ventricular systolic function is low normal with an ejection fraction of 50 % by Stack's method of disks with an ejection fraction visually estimated at 50 to 55 %.   3. There is no evidence of a left ventricular thrombus.   4. The left ventricular diastolic function is indeterminate.   5. Estimated pulmonary artery systolic pressure is 49 mmHg, consistent with mild to moderate pulmonary hypertension.   6. There is mild calcification of the mitral valve annulus.   7. Normal left and right atrial size.   8. A 27mm Abbott Navitor Vision TAVR is visualized in the aortic position. Mild paravalvular regurgitation visualized. Mean gradient of 6 mmHg. AT= 88.09msec. Dimensionless index= .81.   9. No pericardial effusion seen.  10. Compared to the transthoracic echocardiogram performed on 4/14/2024, interval TAVR done for low gradient severe AS.      < end of copied text >          EKG:< from: 12 Lead ECG (06.8.24 @ 9:01) >    Ventricular Rate 90 BPM      P-R Interval 216 ms    QRS Duration 148 ms    Q-T Interval 410ms    QTC Calculation(Bazett) 501 ms    P Axis 49 degrees    R Axis -19 degrees    T Axis 120 degrees    Diagnosis Line Sinus rhythm with 1st degree AV block  Left bundle branch block  Abnormal ECG      < end of copied text >        PAST MEDICAL & SURGICAL HISTORY:  HTN (hypertension)      HLD (hyperlipidemia)      Gout      Diabetes      COVID-19 vaccine series completed      Anxiety      Nonrheumatic aortic valve stenosis      H/O congestive heart failure      No significant past surgical history       Assessment and Plan:   · Assessment     83 year old male with PMH of severe aortic stenosis, HFpEF (LVEF 50-55%), DMII, HTN, HLD.  Pt. is s/p TAVR (27mm Abbott Navitor  via Right Common Femoral Artery) with Dr. Ascencio on 6/7/24. Pt noted with a new LBBB and now mild first degree AVB post procedure. Post-op coarse some sinus bradycardia holding beta blocker seen by EP.       Problem: Aortic stenois  Plan:  s/p TAVR  Continue ASA & Lisinopril  Hold off on AVN blocking agents, including home metoprolol dose as per EP  Encourage the use of I/S, CDBE, ambulate as tolerated, OOB to chair  Continue to check blood glucose monitoring AC/HS  Resume Metformin on 6/10/24  Discharge to home with MCOT   Patient and family to follow up with with primary cardiologist Dr Torres (and Dr Cobian, EP) at [facility].   Discussed plan with CTS & Dr. Choe in am rounds.        Problem: Complete Heart Block  Plan:  EP following  Continue to hold off on AVN blocking agents, including metoprolol  Discharge with MCOT  As per EP okay to discharge home  Encouraged to follow up with Dr. Torres & Dr. Cobian

## 2024-06-08 NOTE — DISCHARGE NOTE PROVIDER - HOSPITAL COURSE
6/7 admitted through [facility], beginning of case LVEDP 28 mmHg c/w acute on chronic diastolic CHF, post deployment trace PVL (mG 9 mmHg) with brief complete heart block that resolved to a sinus rhythm with a new LBBB, end of procedure CXR with mild pulmonary congestion however unable to treat with IV diuresis in the OR given SBP in the 90's --> in CSD HR 40-60's with continued SBP 90's continuing to hold diuresis .  EP consulted, will discharge with MCOT monitor and follow up as out patient.  Patient now stable for discharge home. 6/7 admitted through [facility], beginning of case LVEDP 28 mmHg c/w acute on chronic diastolic CHF, post deployment trace PVL (mG 9 mmHg) with brief complete heart block that resolved to a sinus rhythm with a new LBBB, end of procedure CXR with mild pulmonary congestion however unable to treat with IV diuresis in the OR given SBP in the 90's --> in CSD HR 40-60's with continued SBP 90's continuing to hold diuresis .  EP consulted, will discharge with MCOT monitor and follow up as out patient.  Patient now stable for discharge home & discussed with Dr. Choe.

## 2024-06-08 NOTE — DISCHARGE NOTE PROVIDER - CARE PROVIDER_API CALL
Adriano Guillen  Thoracic and Cardiac Surgery  [address] [place] [id number]  Phone: [phone]  Fax: [phone]  Follow Up Time:     Sonya Torres  Cardiology  [address], [address]  [place] [address]  Phone: [phone]  Fax: [phone]  Follow Up Time:

## 2024-06-08 NOTE — PROGRESS NOTE ADULT - PROBLEM SELECTOR PLAN 5
Anesthesia Evaluation     Patient summary reviewed   no history of anesthetic complications:   NPO Solid Status: > 8 hours             Airway   Mallampati: II  Dental    (+) poor dentition    Pulmonary    (+) ,sleep apnea on CPAP  (-) COPD, asthma, not a smoker  Cardiovascular   Exercise tolerance: excellent (>7 METS)    (+) hypertension, hyperlipidemia  (-) pacemaker, past MI, angina, cardiac stents      Neuro/Psych  (+) CVA  (-) seizures, TIA  GI/Hepatic/Renal/Endo    (+) obesity, GERD  (-) liver disease, no renal disease, diabetes    Musculoskeletal     Abdominal    Substance History      OB/GYN          Other                          Anesthesia Plan    ASA 3     general with block     intravenous induction     Anesthetic plan, risks, benefits, and alternatives have been provided, discussed and informed consent has been obtained with: patient.        CODE STATUS:        Diet recommendations made by speech pathology last admission,   soft/bite-sized solids (extra moistened with gravies & sauces, etc., AVOID dry textures), & thin fluids    allow for swallow between intakes; alternate food with liquid; maintain upright posture during/after eating for 30 mins; oral hygiene; position upright (90 degrees); small sips/bites

## 2024-06-08 NOTE — PROGRESS NOTE ADULT - PROBLEM SELECTOR PLAN 2
resume home meds mart discharge  hold metformin for 48 hours  follow up with PCP/endo as out patient for continued management

## 2024-06-08 NOTE — DISCHARGE NOTE PROVIDER - NSDCFUADDAPPT_GEN_ALL_CORE_FT
Please follow up with Dr. Guillen by calling the CT Surgery office at [phone] on the next open business day to make an appointment.  The cardiac surgery office is located on the first floor of [facility] at [address] [place] [address]. Please enter through the lobby. A [facility] employee will then direct you where to go.  --  Your Care Navigator Nurse Practitioner will be in touch to see you in your home within a few days from discharge. The Follow Your Heart program can help ensure you understand your medications, discharge instructions and answer any questions you may have at that time. They are also a great source to address concerns during the day and may be reached at [phone].

## 2024-06-08 NOTE — PROGRESS NOTE ADULT - PROBLEM SELECTOR PLAN 3
Resume home medications for hypertension as appropriate  will hold beta blockers secondary to new LBBB post TAVR  follow up with PCP/cardiologist for optimal medical management

## 2024-06-08 NOTE — DISCHARGE NOTE NURSING/CASE MANAGEMENT/SOCIAL WORK - PATIENT PORTAL LINK FT
You can access the FollowMyHealth Patient Portal offered by [facility] by registering at the following website: [email or web address]. By joining [employer]’s FollowMyHealth portal, you will also be able to view your health information using other applications (apps) compatible with our system.

## 2024-06-08 NOTE — DISCHARGE NOTE PROVIDER - NSDCFUADDINST_GEN_ALL_CORE_FT
Please call the Cardiothoracic Surgery office at [phone] if you are experiencing any shortness of breath, chest pain, fevers or chills, drainage from the incisions, persistent nausea, vomiting or if you have any questions about your medications. If the symptoms are severe, call 911 and go to the nearest hospital.

## 2024-06-08 NOTE — DISCHARGE NOTE PROVIDER - CARE PROVIDERS DIRECT ADDRESSES
[email or web address]

## 2024-06-08 NOTE — DISCHARGE NOTE NURSING/CASE MANAGEMENT/SOCIAL WORK - NSDCFUADDAPPT_GEN_ALL_CORE_FT
Please follow up with Dr. Guillen by calling the CT Surgery office at [phone] on the next open business day to make an appointment.  The cardiac surgery office is located on the first floor of [facility] at [address] [place] [address]. Please enter through the lobby. A [facility] employee will then direct you where to go.  --  Your Care Navigator Nurse Practitioner will be in touch to see you in your home within a few days from discharge. The Follow Your Heart program can help ensure you understand your medications, discharge instructions and answer any questions you may have at that time. They are also a great source to address concerns during the day and may be reached at [phone].

## 2024-06-08 NOTE — PROGRESS NOTE ADULT - PROBLEM SELECTOR PLAN 1
now s/p TAVR  Bedrest overnight  aspirin  discharge planning if rhythm stable  discussed with CTS team on AM rounds

## 2024-06-08 NOTE — DISCHARGE NOTE PROVIDER - NSDCMRMEDTOKEN_GEN_ALL_CORE_FT
allopurinol 100 mg oral tablet: orally once a day  glimepiride 1 mg oral tablet: 1 tab(s) orally once a day  Januvia 100 mg oral tablet: 1 tab(s) orally once a day  lisinopril 20 mg oral tablet: 1 tab(s) orally once a day  metFORMIN 1000 mg oral tablet: 1 tab(s) orally 2 times a day  Metoprolol Tartrate 25 mg oral tablet: 1 tab(s) orally 2 times a day  Norvasc 5 mg oral tablet: 1 tab(s) orally once a day  Seroquel 25 mg oral tablet: 1 tab(s) orally once a day (at bedtime)  Zocor 40 mg oral tablet: 1 tab(s) orally once a day   allopurinol 100 mg oral tablet: orally once a day  aspirin 81 mg oral tablet, chewable: 1 tab(s) orally once a day  glimepiride 1 mg oral tablet: 1 tab(s) orally once a day  Januvia 100 mg oral tablet: 1 tab(s) orally once a day  lisinopril 20 mg oral tablet: 1 tab(s) orally once a day  metFORMIN 1000 mg oral tablet: 1 tab(s) orally 2 times a day  Norvasc 5 mg oral tablet: 1 tab(s) orally once a day  pantoprazole 40 mg oral delayed release tablet: 1 tab(s) orally once a day (before a meal)  Seroquel 25 mg oral tablet: 1 tab(s) orally once a day (at bedtime)  Zocor 40 mg oral tablet: 1 tab(s) orally once a day

## 2024-06-08 NOTE — PROGRESS NOTE ADULT - SUBJECTIVE AND OBJECTIVE BOX
Subjective: No acute events documented overnight. Pt reports episode of dizziness this AM after sitting up. No other complaints. Denies palpitations chest pain, SOB.       EKG: Sinus rhythm with intact conduction. LBBB QRS 148ms. MO: 216ms    TELE: Sinus rhythm with intact conduction. LBBB. Ventricular rates 60-80s    MEDICATIONS  (STANDING):  aspirin  chewable 81 milliGRAM(s) Oral daily  cefuroxime  IVPB 1500 milliGRAM(s) IV Intermittent every 8 hours  cefuroxime  IVPB 1500 milliGRAM(s) IV Intermittent once  dextrose 10% Bolus 125 milliLiter(s) IV Bolus once  dextrose 5%. 1000 milliLiter(s) (100 mL/Hr) IV Continuous <Continuous>  dextrose 5%. 1000 milliLiter(s) (50 mL/Hr) IV Continuous <Continuous>  dextrose 50% Injectable 25 Gram(s) IV Push once  dextrose 50% Injectable 12.5 Gram(s) IV Push once  glucagon  Injectable 1 milliGRAM(s) IntraMuscular once  insulin lispro (ADMELOG) corrective regimen sliding scale   SubCutaneous Before meals and at bedtime  pantoprazole    Tablet 40 milliGRAM(s) Oral before breakfast  simvastatin 40 milliGRAM(s) Oral at bedtime  sodium chloride 0.9%. 1000 milliLiter(s) (10 mL/Hr) IV Continuous <Continuous>    MEDICATIONS  (PRN):  dextrose Oral Gel 15 Gram(s) Oral once PRN Blood Glucose LESS THAN 70 milliGRAM(s)/deciliter      Allergies    No Known Allergies    Intolerances        Vital Signs Last 24 Hrs  T(C): 37.2 (08 Jun 2024 08:00), Max: 37.2 (08 Jun 2024 08:00)  T(F): 98.9 (08 Jun 2024 08:00), Max: 98.9 (08 Jun 2024 08:00)  HR: 91 (08 Jun 2024 08:00) (40 - 94)  BP: 131/56 (08 Jun 2024 08:00) (94/46 - 158/76)  BP(mean): 79 (08 Jun 2024 08:00) (60 - 97)  RR: 18 (08 Jun 2024 08:00) (12 - 20)  SpO2: 97% (08 Jun 2024 08:00) (94% - 100%)    Parameters below as of 08 Jun 2024 08:00  Patient On (Oxygen Delivery Method): room air        Physical Exam:  Constitutional: NAD  Resp: effort normal, breath sounds clear to auscultation bilaterally  Cardiac: Heart regular rate and rhythm, no murmurs, rubs, or gallops.  No edema.  Neuro: Alert and oriented x 3,  Skin: color normal, no rashes or injury    LABS:                        8.6    6.59  )-----------( 140      ( 08 Jun 2024 03:45 )             26.1     06-08    139  |  110<H>  |  15.0  ----------------------------<  107<H>  3.4<L>   |  16.0<L>  |  0.69    Ca    7.0<L>      08 Jun 2024 03:45  Mg     1.1     06-08    TPro  4.4<L>  /  Alb  3.0<L>  /  TBili  0.4  /  DBili  x   /  AST  18  /  ALT  8   /  AlkPhos  43  06-08    PT/INR - ( 08 Jun 2024 03:45 )   PT: 13.1 sec;   INR: 1.19 ratio         PTT - ( 08 Jun 2024 03:45 )  PTT:25.1 sec  Urinalysis Basic - ( 08 Jun 2024 03:45 )    Color: x / Appearance: x / SG: x / pH: x  Gluc: 107 mg/dL / Ketone: x  / Bili: x / Urobili: x   Blood: x / Protein: x / Nitrite: x   Leuk Esterase: x / RBC: x / WBC x   Sq Epi: x / Non Sq Epi: x / Bacteria: x        RADIOLOGY & ADDITIONAL TESTS:    < from: TTE Limited W or WO Ultrasound Enhancing Agent (06.07.24 @ 13:10) >   1. Limited intraop TAVR study.   2. Left ventricular systolic function is low normal with an ejection fraction visually estimated at 50 to 55 %.   3. Normal right ventricular cavity size and normal systolic function.   4. Severe aortic valve stenosis.   5. Post-TAVR a 27 mm Abbot Navitor valve implanted at the aortic position with trace paravalvular regurgitation, peak velocity 1.7 m/s, and mean gradient of 4.8 mmHg.   6. No pericardial effusion seen.   7. Compared to the transthoracic echocardiogram performed on 4/14/2024, prior LV EF 55-60%    < end of copied text >          Incomplete note   Subjective: No acute events documented overnight. Pt reports episode of dizziness this AM after sitting up. No other complaints. Denies palpitations chest pain, SOB.       EKG: Sinus rhythm with intact conduction. LBBB QRS 148ms. GA: 216ms    TELE: Sinus rhythm with intact conduction. LBBB. Ventricular rates 60-80s    MEDICATIONS  (STANDING):  aspirin  chewable 81 milliGRAM(s) Oral daily  cefuroxime  IVPB 1500 milliGRAM(s) IV Intermittent every 8 hours  cefuroxime  IVPB 1500 milliGRAM(s) IV Intermittent once  dextrose 10% Bolus 125 milliLiter(s) IV Bolus once  dextrose 5%. 1000 milliLiter(s) (100 mL/Hr) IV Continuous <Continuous>  dextrose 5%. 1000 milliLiter(s) (50 mL/Hr) IV Continuous <Continuous>  dextrose 50% Injectable 25 Gram(s) IV Push once  dextrose 50% Injectable 12.5 Gram(s) IV Push once  glucagon  Injectable 1 milliGRAM(s) IntraMuscular once  insulin lispro (ADMELOG) corrective regimen sliding scale   SubCutaneous Before meals and at bedtime  pantoprazole    Tablet 40 milliGRAM(s) Oral before breakfast  simvastatin 40 milliGRAM(s) Oral at bedtime  sodium chloride 0.9%. 1000 milliLiter(s) (10 mL/Hr) IV Continuous <Continuous>    MEDICATIONS  (PRN):  dextrose Oral Gel 15 Gram(s) Oral once PRN Blood Glucose LESS THAN 70 milliGRAM(s)/deciliter      Allergies    No Known Allergies    Intolerances        Vital Signs Last 24 Hrs  T(C): 37.2 (08 Jun 2024 08:00), Max: 37.2 (08 Jun 2024 08:00)  T(F): 98.9 (08 Jun 2024 08:00), Max: 98.9 (08 Jun 2024 08:00)  HR: 91 (08 Jun 2024 08:00) (40 - 94)  BP: 131/56 (08 Jun 2024 08:00) (94/46 - 158/76)  BP(mean): 79 (08 Jun 2024 08:00) (60 - 97)  RR: 18 (08 Jun 2024 08:00) (12 - 20)  SpO2: 97% (08 Jun 2024 08:00) (94% - 100%)    Parameters below as of 08 Jun 2024 08:00  Patient On (Oxygen Delivery Method): room air        Physical Exam:  Constitutional: NAD  Resp: effort normal, breath sounds clear to auscultation bilaterally  Cardiac: Heart regular rate and rhythm, no murmurs, rubs, or gallops.  No edema.  Neuro: Alert and oriented x 3,  Skin: color normal, no rashes or injury    LABS:                        8.6    6.59  )-----------( 140      ( 08 Jun 2024 03:45 )             26.1     06-08    139  |  110<H>  |  15.0  ----------------------------<  107<H>  3.4<L>   |  16.0<L>  |  0.69    Ca    7.0<L>      08 Jun 2024 03:45  Mg     1.1     06-08    TPro  4.4<L>  /  Alb  3.0<L>  /  TBili  0.4  /  DBili  x   /  AST  18  /  ALT  8   /  AlkPhos  43  06-08    PT/INR - ( 08 Jun 2024 03:45 )   PT: 13.1 sec;   INR: 1.19 ratio         PTT - ( 08 Jun 2024 03:45 )  PTT:25.1 sec  Urinalysis Basic - ( 08 Jun 2024 03:45 )    Color: x / Appearance: x / SG: x / pH: x  Gluc: 107 mg/dL / Ketone: x  / Bili: x / Urobili: x   Blood: x / Protein: x / Nitrite: x   Leuk Esterase: x / RBC: x / WBC x   Sq Epi: x / Non Sq Epi: x / Bacteria: x        RADIOLOGY & ADDITIONAL TESTS:    < from: TTE Limited W or WO Ultrasound Enhancing Agent (06.07.24 @ 13:10) >   1. Limited intraop TAVR study.   2. Left ventricular systolic function is low normal with an ejection fraction visually estimated at 50 to 55 %.   3. Normal right ventricular cavity size and normal systolic function.   4. Severe aortic valve stenosis.   5. Post-TAVR a 27 mm Abbot Navitor valve implanted at the aortic position with trace paravalvular regurgitation, peak velocity 1.7 m/s, and mean gradient of 4.8 mmHg.   6. No pericardial effusion seen.   7. Compared to the transthoracic echocardiogram performed on 4/14/2024, prior LV EF 55-60%    < end of copied text >

## 2024-06-08 NOTE — DISCHARGE NOTE PROVIDER - NSDCCPTREATMENT_GEN_ALL_CORE_FT
PRINCIPAL PROCEDURE  Procedure: TAVR, percutaneous  Findings and Treatment: Percutaneous Transfemoral TAVR via Right Common Femoral Artery (27mm Navitor Vision) (NCT# [id number]) (STS/ACC TVT Registry Patient ID# [id number])

## 2024-06-08 NOTE — PROGRESS NOTE ADULT - ASSESSMENT
83M with severe aortic stenosis, s/p TAVR,   noted with a new LBBB post procedure with episodes of sustained sinus bradycardia in the 40s. Telemetry review post procedure reveals predominately sinus rhythm / sinus bradycardia (40-50s) with new LBBB, and brief episodes of PAT. 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 Soft, bite sized diet recommended, avoid dry foods.  
Pt is a 84 y/o male with severe aortic stenosis, HFpEF (LVEF 50-55%), DMII, HTN, HLD, who is now POD # 1 s/p TAVR (27mm Abbott Navitor  via Right Common Femoral Artery) with Dr. Guillen. Pt noted with a new LBBB and now mild first degree AVB post procedure. Some sinus bradycardia now improved after holding beta blocker, and brief runs of pAT. No high grade AVB / significant pauses appreciated. Pt feeling well and denies chest pain, SOB, palpitations dizziness.    Recommendations:  - Hold all AVN blocking agents, including home metoprolol dose  - Maintain telemetry monitoring while inpatient  - If no evidence of worsening conduction and pt remains asymptomatic would recommend 30 day MCOT at time of discharge. If symptomatic bradycardia or AV block noted on monitoring, will need prompt return for pacemaker implant. Discussed in detail with pt and his daughter Viridiana at bedside (Viridiana number is [phone])  -if no worsening conduction further followup with primary cardiologist Dr Torres (and Dr Cobian, EP) at [facility].

## 2024-06-08 NOTE — DISCHARGE NOTE PROVIDER - NSDCFUSCHEDAPPT_GEN_ALL_CORE_FT
Bridgett Hughes  [facility] Physician [facility] [address]  Scheduled Appointment: 07/08/2024    Bridgett Hughes  [facility] [address]  Scheduled Appointment: 08/01/2024

## 2024-06-08 NOTE — PROGRESS NOTE ADULT - SUBJECTIVE AND OBJECTIVE BOX
83y Male s/p TAVR, percutaneous        Subjective: Patient has no complaints, laying comfortably in bed.  denies lightheadedness, chest pain, abdominal pain, headache, SOB.      T(C): 36.5 (06-07-24 @ 21:00), Max: 36.7 (06-07-24 @ 11:39)  HR: 94 (06-07-24 @ 23:00) (40 - 94)  BP: 110/78 (06-07-24 @ 23:00) (94/46 - 145/64)  ABP: 138/56 (06-07-24 @ 23:00) (94/35 - 165/68)  ABP(mean): 83 (06-07-24 @ 23:00) (53 - 85)  RR: 20 (06-07-24 @ 23:00) (12 - 20)  SpO2: 100% (06-07-24 @ 23:00) (94% - 100%)  Wt(kg): --  CVP(mm Hg): --  CO: --  CI: --  PA: --         06-07    139  |  109<H>  |  18.6  ----------------------------<  125<H>  4.0   |  20.0<L>  |  0.82    Ca    8.8      07 Jun 2024 15:15  Mg     1.1     06-07    TPro  4.9<L>  /  Alb  3.3  /  TBili  0.3<L>  /  DBili  x   /  AST  18  /  ALT  8   /  AlkPhos  45  06-07                               8.6    7.14  )-----------( 165      ( 07 Jun 2024 17:26 )             26.4        PT/INR - ( 07 Jun 2024 15:15 )   PT: 11.8 sec;   INR: 1.07 ratio         PTT - ( 07 Jun 2024 15:15 )  PTT:33.6 sec  ABG - ( 07 Jun 2024 15:14 )  pH, Arterial: 7.360 pH, Blood: x     /  pCO2: 39    /  pO2: 83    / HCO3: 22    / Base Excess: -3.4  /  SaO2: 97.4                       CAPILLARY BLOOD GLUCOSE      POCT Blood Glucose.: 108 mg/dL (07 Jun 2024 21:25)  POCT Blood Glucose.: 123 mg/dL (07 Jun 2024 17:18)  POCT Blood Glucose.: 97 mg/dL (07 Jun 2024 11:30)           CXR:    I&O's Detail    07 Jun 2024 07:01  -  08 Jun 2024 00:34  --------------------------------------------------------  IN:    IV PiggyBack: 50 mL    IV PiggyBack: 200 mL    Lactated Ringers Bolus: 500 mL    sodium chloride 0.9%: 40 mL  Total IN: 790 mL    OUT:    Voided (mL): 1675 mL  Total OUT: 1675 mL    Total NET: -885 mL          MEDICATIONS  (STANDING):  aspirin  chewable 81 milliGRAM(s) Oral daily  cefuroxime  IVPB 1500 milliGRAM(s) IV Intermittent once  cefuroxime  IVPB 1500 milliGRAM(s) IV Intermittent every 8 hours  dextrose 10% Bolus 125 milliLiter(s) IV Bolus once  dextrose 5%. 1000 milliLiter(s) (100 mL/Hr) IV Continuous <Continuous>  dextrose 5%. 1000 milliLiter(s) (50 mL/Hr) IV Continuous <Continuous>  dextrose 50% Injectable 12.5 Gram(s) IV Push once  dextrose 50% Injectable 25 Gram(s) IV Push once  glucagon  Injectable 1 milliGRAM(s) IntraMuscular once  insulin lispro (ADMELOG) corrective regimen sliding scale   SubCutaneous Before meals and at bedtime  pantoprazole    Tablet 40 milliGRAM(s) Oral before breakfast  simvastatin 40 milliGRAM(s) Oral at bedtime  sodium chloride 0.9%. 1000 milliLiter(s) (10 mL/Hr) IV Continuous <Continuous>    MEDICATIONS  (PRN):  dextrose Oral Gel 15 Gram(s) Oral once PRN Blood Glucose LESS THAN 70 milliGRAM(s)/deciliter      Physical Exam  Neuro: A+O x 3, non-focal, speech clear and intact, [place]  Pulm: CTA, equal bilaterally  CV: RRR, +S1S2  Abd: soft, NT, ND, +BS  Ext: RAO x 4, no edema  Inc: bilateral groins +dressings, no hemaoma    -----------------------------------------------------------------------------------------------------------------------------------------------------------------------------  -----------------------------------------------------------------------------------------------------------------------------------------------------------------------------

## 2024-06-08 NOTE — DISCHARGE NOTE PROVIDER - NSDCCPCAREPLAN_GEN_ALL_CORE_FT
PRINCIPAL DISCHARGE DIAGNOSIS  Diagnosis: Severe aortic stenosis  Assessment and Plan of Treatment: - Keep the groin site clean and dry.  You may shower, using a gentle soap over the incision and pat the site dry.  - Watch the site for signs of redness or drainage, these should be reported to your doctor immediately.  - You will receive a wallet card about your new valve in the mail.  Please carry it with you to present to anyone who may ask if you have any medical implants.  - Be sure to inform your doctors including your dentist about your valve since you will need to take antibiotics to reduce the risk of infection before certain medical and dental procedures.  - You will be given an appointment to follow up with your doctor in approximately 4 weeks.  It is important to keep this appointment so that your new valve can be assessed.  - You may resume all your normal activities as you feel comfortable doing so. Please avoid heavy lifting for 2 weeks.        SECONDARY DISCHARGE DIAGNOSES  Diagnosis: H/O congestive heart failure  Assessment and Plan of Treatment: Chronic diastolic CHF    Diagnosis: HLD (hyperlipidemia)  Assessment and Plan of Treatment: Please follow up with your Cardiologist and Primary Care Physician 2-4 weeks from discharge.      Diagnosis: HTN (hypertension)  Assessment and Plan of Treatment: Please follow up with your Cardiologist and Primary Care Physician 2-4 weeks from discharge.      Diagnosis: Diabetes  Assessment and Plan of Treatment: Please follow up with your Cardiologist and Primary Care Physician 2-4 weeks from discharge.

## 2024-06-09 ENCOUNTER — TRANSCRIPTION ENCOUNTER (OUTPATIENT)
Age: 84
End: 2024-06-09

## 2024-06-10 ENCOUNTER — NON-APPOINTMENT (OUTPATIENT)
Age: 84
End: 2024-06-10

## 2024-06-10 RX ORDER — ASPIRIN 81 MG/1
81 TABLET, CHEWABLE ORAL
Qty: 90 | Refills: 0 | Status: ACTIVE | COMMUNITY
Start: 2024-06-10

## 2024-06-10 RX ORDER — PANTOPRAZOLE 40 MG/1
40 TABLET, DELAYED RELEASE ORAL
Qty: 90 | Refills: 0 | Status: ACTIVE | COMMUNITY
Start: 2024-06-10

## 2024-06-10 RX ORDER — ALLOPURINOL 100 MG/1
100 TABLET ORAL DAILY
Qty: 90 | Refills: 1 | Status: ACTIVE | COMMUNITY

## 2024-06-10 RX ORDER — METOPROLOL TARTRATE 25 MG/1
25 TABLET, FILM COATED ORAL TWICE DAILY
Qty: 30 | Refills: 2 | Status: DISCONTINUED | COMMUNITY
Start: 2024-04-24 | End: 2024-06-10

## 2024-06-10 RX ORDER — GLIMEPIRIDE 1 MG/1
1 TABLET ORAL DAILY
Qty: 90 | Refills: 0 | Status: ACTIVE | COMMUNITY
Start: 2023-12-20

## 2024-06-10 RX ORDER — AMLODIPINE BESYLATE 5 MG/1
5 TABLET ORAL DAILY
Qty: 90 | Refills: 1 | Status: ACTIVE | COMMUNITY
Start: 2021-10-01

## 2024-06-11 ENCOUNTER — APPOINTMENT (OUTPATIENT)
Dept: CARE COORDINATION | Facility: HOME HEALTH | Age: 84
End: 2024-06-11

## 2024-06-11 VITALS
WEIGHT: 149.6 LBS | RESPIRATION RATE: 15 BRPM | DIASTOLIC BLOOD PRESSURE: 68 MMHG | HEIGHT: 72 IN | BODY MASS INDEX: 20.26 KG/M2 | SYSTOLIC BLOOD PRESSURE: 145 MMHG | HEART RATE: 110 BPM | OXYGEN SATURATION: 95 %

## 2024-06-11 NOTE — REASON FOR VISIT
[Follow-Up: _____] : a [unfilled] follow-up visit [FreeTextEntry1] : FOLLOW YOUR HEART- Transitional Care Management Program- [facility]

## 2024-06-11 NOTE — HISTORY OF PRESENT ILLNESS
[FreeTextEntry1] :  Mr. JERMAN MCKEON is an 83-year-old male with a pmh of severe aortic stenosis, HFpEF, DM, HTN, HLD, T2DM and anxiety disorder admitted through [facility] for TAVR, beginning of case LVEDP 28 mmHg c/w acute on chronic diastolic CHF, post deployment trace PVL (mG 9 mmHg) with brief complete heart block that resolved to a sinus rhythm with a new LBBB, end of procedure CXR with mild pulmonary congestion however unable to treat with IV diuresis in the OR given SBP in the 90's --> in CSD HR 40-60's with continued SBP 90's continuing to hold diuresis . EP consulted, pt discharged with MCOT monitor and follow up as outpatient. Pt remained hemodynamically stable and discharged home with support of spouse/family, home care services and the [facility] team. Initial visit completed in home CC "I'm feeling fine"

## 2024-06-11 NOTE — PHYSICAL EXAM
[Sclera] : the sclera and conjunctiva were normal [Neck Appearance] : the appearance of the neck was normal [Respiration, Rhythm And Depth] : normal respiratory rhythm and effort [Exaggerated Use Of Accessory Muscles For Inspiration] : no accessory muscle use [Auscultation Breath Sounds / Voice Sounds] : lungs were clear to auscultation bilaterally [Apical Impulse] : the apical impulse was normal [Heart Sounds] : normal S1 and S2 [Murmurs] : no murmurs [FreeTextEntry2] : Feet pink and warm, trace pedal edema noted R>L at baseline as per pt, b/l calves soft NT ND [Abnormal Walk] : normal gait [Skin Color & Pigmentation] : normal skin color and pigmentation [] : no rash [FreeTextEntry1] : at baseline  [Oriented To Time, Place, And Person] : oriented to person, place, and time [Impaired Insight] : insight and judgment were intact [Affect] : the affect was normal [Mood] : the mood was normal

## 2024-06-11 NOTE — REVIEW OF SYSTEMS
[Heart Rate Is Slow] : the heart rate was not slow [Heart Rate Is Fast] : the heart rate was not fast [Chest Pain] : no chest pain [Palpitations] : no palpitations [Leg Claudication] : no intermittent leg claudication [Negative] : Psychiatric [FreeTextEntry7] : last BM today

## 2024-06-11 NOTE — ASSESSMENT
[FreeTextEntry1] : Pt recovering well at home s/p TAVR. Reviewed all medications and dosages with pt understanding. Pt has all medications in home and is taking as prescribed. Pain controlled with current medication regimen. Pt with area or excoriated skin on his left flank s/p adhesive removal. Area is causing him mild discomfort but appears to be healing with scab in place, Pt advised he can cover with non adherent gauze and tape and he may use Bacitracin or Neosporin to the area as this at times relieves some pain at the site. Pt with tachycardia, asymptomatic, post TAVR with LBBB after brief heart block in OR. Metoprolol held on discharge, rev'd with CTS team for any further intervention. No further new symptoms, issues or concerns to report at this time.  PLAN:  -Continue current medication regimen   -Continue Post Operative Care including:      -Cleanse all incisions DAILY with mild soap and water. Avoid lotion, powders and/or creams near or on incisions       -Daily weights- report any increase of 2 lbs or more overnight to the [facility] or CTS team       -Incentive spirometry with cough and deep breathing several times per hour      -Ambulate as much as tolerated including outdoors if weather and safety permits      -Avoid lifting anything more than 5 lbs and avoid straining      -Maintain a low sodium, low fat, heart healthy diet, including healthy sources of protein   Follow Your Heart team will continue to follow up with pt status. NP/CCC roles explained with pt understanding, contact information provided. Pt agrees to call with any questions, issues or concerns.  Worsening symptoms reviewed with patient understanding.    FOLLOW UP APPOINTMENTS: CTS: Dr. Guillen 6/21 CARDIOLOGIST: Dr. Torres 6/19 PCP: Pt encouraged to follow up within one month of discharge, Dr. Lagos 7/8

## 2024-06-19 PROBLEM — I35.0 SEVERE AORTIC STENOSIS: Status: ACTIVE | Noted: 2024-04-24

## 2024-06-19 LAB
BASE EXCESS BLDA CALC-SCNC: -2.5 MMOL/L — LOW (ref -2–3)
BASE EXCESS BLDA CALC-SCNC: 0.1 MMOL/L — SIGNIFICANT CHANGE UP (ref -2–3)
CA-I BLDA-SCNC: 1.18 MMOL/L — SIGNIFICANT CHANGE UP (ref 1.15–1.33)
CA-I BLDA-SCNC: 1.21 MMOL/L — SIGNIFICANT CHANGE UP (ref 1.15–1.33)
CHLORIDE BLDA-SCNC: 110 MMOL/L — HIGH (ref 96–108)
CHLORIDE BLDA-SCNC: 110 MMOL/L — HIGH (ref 96–108)
COHGB MFR BLDA: 1.1 % — SIGNIFICANT CHANGE UP
COHGB MFR BLDA: 1.2 % — SIGNIFICANT CHANGE UP
GLUCOSE BLDA-MCNC: 127 MG/DL — HIGH (ref 70–99)
GLUCOSE BLDA-MCNC: 143 MG/DL — HIGH (ref 70–99)
HCO3 BLDA-SCNC: 25 MMOL/L — SIGNIFICANT CHANGE UP (ref 21–28)
HCO3 BLDA-SCNC: 25 MMOL/L — SIGNIFICANT CHANGE UP (ref 21–28)
HCT VFR BLDA CALC: 26 % — SIGNIFICANT CHANGE UP
HCT VFR BLDA CALC: 29 % — SIGNIFICANT CHANGE UP
HGB BLDA-MCNC: 8.5 G/DL — LOW (ref 12.6–17.4)
HGB BLDA-MCNC: 9.6 G/DL — LOW (ref 12.6–17.4)
LACTATE BLDA-MCNC: 0.8 MMOL/L — SIGNIFICANT CHANGE UP (ref 0.5–2)
LACTATE BLDA-MCNC: 1 MMOL/L — SIGNIFICANT CHANGE UP (ref 0.5–2)
METHGB MFR BLDA: 0.3 % — SIGNIFICANT CHANGE UP
METHGB MFR BLDA: 0.6 % — SIGNIFICANT CHANGE UP
OXYHGB MFR BLDA: 98 % — HIGH (ref 90–95)
OXYHGB MFR BLDA: 98 % — HIGH (ref 90–95)
PCO2 BLDA: 38 MMHG — SIGNIFICANT CHANGE UP (ref 35–48)
PCO2 BLDA: 58 MMHG — HIGH (ref 35–48)
PH BLDA: 7.24 — LOW (ref 7.35–7.45)
PH BLDA: 7.42 — SIGNIFICANT CHANGE UP (ref 7.35–7.45)
PO2 BLDA: 231 MMHG — HIGH (ref 83–108)
PO2 BLDA: 290 MMHG — HIGH (ref 83–108)
POTASSIUM BLDA-SCNC: 4.3 MMOL/L — SIGNIFICANT CHANGE UP (ref 3.5–5.1)
POTASSIUM BLDA-SCNC: 4.4 MMOL/L — SIGNIFICANT CHANGE UP (ref 3.5–5.1)
SAO2 % BLDA: 99.8 % — HIGH (ref 94–98)
SAO2 % BLDA: 99.9 % — HIGH (ref 94–98)
SODIUM BLDA-SCNC: 135 MMOL/L — LOW (ref 136–145)
SODIUM BLDA-SCNC: 136 MMOL/L — SIGNIFICANT CHANGE UP (ref 136–145)

## 2024-06-21 ENCOUNTER — APPOINTMENT (OUTPATIENT)
Dept: CARDIOTHORACIC SURGERY | Facility: CLINIC | Age: 84
End: 2024-06-21
Payer: MEDICARE

## 2024-06-21 VITALS
HEART RATE: 95 BPM | DIASTOLIC BLOOD PRESSURE: 78 MMHG | OXYGEN SATURATION: 99 % | RESPIRATION RATE: 16 BRPM | SYSTOLIC BLOOD PRESSURE: 151 MMHG

## 2024-06-21 DIAGNOSIS — I35.0 NONRHEUMATIC AORTIC (VALVE) STENOSIS: ICD-10-CM

## 2024-06-21 PROCEDURE — 99213 OFFICE O/P EST LOW 20 MIN: CPT

## 2024-06-21 RX ORDER — FUROSEMIDE 20 MG/1
20 TABLET ORAL
Qty: 3 | Refills: 0 | Status: ACTIVE | COMMUNITY
Start: 2024-06-21 | End: 1900-01-01

## 2024-06-21 NOTE — ASSESSMENT
[FreeTextEntry1] : I had the pleasure of seeing Mr Haynes in the office today following his transcatheter aortic valve replacement.  Exam was benign with well healing incisions and MCOT review was unremarkable.  During the visit, we discussed the importance of increasing activity and exercise as tolerated. We discussed the need for antibiotic prophylaxis with procedures such as dental work and colonoscopy or endoscopy. We recommend waiting a full six months before undergoing any dental procedure or cleaning. The patient should maintain the MCOT device as instructed for a total of 30 days.  Overall, I am happy with his progress. All questions were answered and concerns were addressed. I encouraged the patient to call the office with any other concerns.   PLAN: - Follow up Cardiology  - CBC/BMP 30 Day Follow Up - Transthoracic Echocardiogram 30 Day Follow Up - EKG 30 Day Follow Up  - Maintain Zio-Patch

## 2024-06-21 NOTE — DISCUSSION/SUMMARY
[Doing Well] : is doing well [Excellent Pain Control] : has excellent pain control [No Sign of Infection] : is showing no signs of infection [FreeTextEntry1] : I had the pleasure of seeing _  in the office today following his transcatheter aortic valve replacement.  Exam was benign with well healing incisions and MCOT review was unremarkable.  During the visit, we discussed the importance of increasing activity and exercise as tolerated. We discussed the need for antibiotic prophylaxis with procedures such as dental work and colonoscopy or endoscopy. We recommend waiting a full six months before undergoing any dental procedure or cleaning. The patient should maintain the MCOT device as instructed for a total of 30 days.  Overall, I am happy with -  progress. All questions were answered and concerns were addressed. I encouraged the patient to call the office with any other concerns.   PLAN: - Follow up Cardiology  - CBC/BMP 30 Day Follow Up - Transthoracic Echocardiogram 30 Day Follow Up - EKG 30 Day Follow Up  - Maintain Zio-Patch

## 2024-06-21 NOTE — REASON FOR VISIT
[Family Member] : family member [de-identified] : Transcatheter aortic vlave replacement #27 Saqib Magallon  [de-identified] : 6/7/24 [de-identified] : beginning of case LVEDP 28 mmHg c/w acute on chronic diastolic CHF, post deployment trace PVL (mG 9 mmHg) with brief complete heart block that resolved to a sinus rhythm with a new LBBB, end of procedure CXR with mild pulmonary congestion however unable to treat with IV diuresis in the OR given SBP in the 90's --> in CSD HR 40-60's with continued SBP 90's continuing to hold diuresis. EP consulted, discharged with MCOT monitor and follow up as out patient.

## 2024-06-21 NOTE — PHYSICAL EXAM
[Respiration, Rhythm And Depth] : normal respiratory rhythm and effort [Auscultation Breath Sounds / Voice Sounds] : lungs were clear to auscultation bilaterally [Heart Rate And Rhythm] : heart rate was normal and rhythm regular [Heart Sounds] : normal S1 and S2 [Clean] : clean [Dry] : dry [Healing Well] : healing well [___ +] : bilateral ankle [unfilled]U+ pitting edema [FreeTextEntry1] : All Review of Systems Negative----

## 2024-07-02 ENCOUNTER — APPOINTMENT (OUTPATIENT)
Dept: FAMILY MEDICINE | Facility: CLINIC | Age: 84
End: 2024-07-02
Payer: MEDICARE

## 2024-07-02 VITALS
HEART RATE: 87 BPM | WEIGHT: 150 LBS | OXYGEN SATURATION: 98 % | DIASTOLIC BLOOD PRESSURE: 70 MMHG | HEIGHT: 72 IN | SYSTOLIC BLOOD PRESSURE: 138 MMHG | BODY MASS INDEX: 20.32 KG/M2

## 2024-07-02 DIAGNOSIS — R13.19 OTHER DYSPHAGIA: ICD-10-CM

## 2024-07-02 DIAGNOSIS — R79.9 ABNORMAL FINDING OF BLOOD CHEMISTRY, UNSPECIFIED: ICD-10-CM

## 2024-07-02 DIAGNOSIS — Z95.2 PRESENCE OF PROSTHETIC HEART VALVE: ICD-10-CM

## 2024-07-02 PROCEDURE — 36415 COLL VENOUS BLD VENIPUNCTURE: CPT

## 2024-07-02 PROCEDURE — 99214 OFFICE O/P EST MOD 30 MIN: CPT

## 2024-07-03 PROBLEM — R79.9 ABNORMAL BLOOD CHEMISTRY: Status: ACTIVE | Noted: 2024-07-03

## 2024-07-03 PROBLEM — R13.19 ESOPHAGEAL DYSPHAGIA: Status: ACTIVE | Noted: 2024-07-03

## 2024-07-08 LAB
ALBUMIN SERPL ELPH-MCNC: 4.7 G/DL
ALP BLD-CCNC: 75 U/L
ALT SERPL-CCNC: 9 U/L
ANION GAP SERPL CALC-SCNC: 13 MMOL/L
AST SERPL-CCNC: 18 U/L
BASOPHILS # BLD AUTO: 0.06 K/UL
BASOPHILS NFR BLD AUTO: 0.8 %
BILIRUB SERPL-MCNC: 0.3 MG/DL
BUN SERPL-MCNC: 28 MG/DL
CALCIUM SERPL-MCNC: 9.5 MG/DL
CHLORIDE SERPL-SCNC: 107 MMOL/L
CO2 SERPL-SCNC: 20 MMOL/L
CREAT SERPL-MCNC: 1.22 MG/DL
EGFR: 58 ML/MIN/1.73M2
EOSINOPHIL # BLD AUTO: 0.31 K/UL
EOSINOPHIL NFR BLD AUTO: 4.2 %
GLUCOSE SERPL-MCNC: 73 MG/DL
HCT VFR BLD CALC: 31.2 %
HGB BLD-MCNC: 9.7 G/DL
IMM GRANULOCYTES NFR BLD AUTO: 0.4 %
LYMPHOCYTES # BLD AUTO: 1.43 K/UL
LYMPHOCYTES NFR BLD AUTO: 19.5 %
MAGNESIUM SERPL-MCNC: 1.7 MG/DL
MAN DIFF?: NORMAL
MCHC RBC-ENTMCNC: 28 PG
MCHC RBC-ENTMCNC: 31.1 GM/DL
MCV RBC AUTO: 90.2 FL
MONOCYTES # BLD AUTO: 0.79 K/UL
MONOCYTES NFR BLD AUTO: 10.8 %
NEUTROPHILS # BLD AUTO: 4.7 K/UL
NEUTROPHILS NFR BLD AUTO: 64.3 %
PLATELET # BLD AUTO: 178 K/UL
POTASSIUM SERPL-SCNC: 5.3 MMOL/L
PROT SERPL-MCNC: 7 G/DL
RBC # BLD: 3.46 M/UL
RBC # FLD: 15.9 %
SODIUM SERPL-SCNC: 141 MMOL/L
WBC # FLD AUTO: 7.32 K/UL

## 2024-07-09 ENCOUNTER — TRANSCRIPTION ENCOUNTER (OUTPATIENT)
Age: 84
End: 2024-07-09

## 2024-08-01 ENCOUNTER — APPOINTMENT (OUTPATIENT)
Dept: FAMILY MEDICINE | Facility: CLINIC | Age: 84
End: 2024-08-01

## 2024-08-29 ENCOUNTER — APPOINTMENT (OUTPATIENT)
Dept: FAMILY MEDICINE | Facility: CLINIC | Age: 84
End: 2024-08-29
Payer: MEDICARE

## 2024-08-29 VITALS — BODY MASS INDEX: 20.59 KG/M2 | HEART RATE: 94 BPM | WEIGHT: 152 LBS | HEIGHT: 72 IN | OXYGEN SATURATION: 98 %

## 2024-08-29 VITALS — SYSTOLIC BLOOD PRESSURE: 144 MMHG | DIASTOLIC BLOOD PRESSURE: 82 MMHG

## 2024-08-29 DIAGNOSIS — R60.0 LOCALIZED EDEMA: ICD-10-CM

## 2024-08-29 PROCEDURE — 99213 OFFICE O/P EST LOW 20 MIN: CPT

## 2024-08-29 RX ORDER — FUROSEMIDE 20 MG/1
20 TABLET ORAL DAILY
Qty: 14 | Refills: 0 | Status: ACTIVE | COMMUNITY
Start: 2024-08-29 | End: 1900-01-01

## 2024-08-29 NOTE — HISTORY OF PRESENT ILLNESS
[FreeTextEntry8] : 83-year-old patient Anthony Nguyen presents with swelling in his left foot, which he has noticed for 4 days. He denies any injury, pain, shortness of breath, or joint pain. The patient leads a rather active lifestyle and was initially having difficulty with climbing stairs but that has resolved.

## 2024-08-29 NOTE — PLAN
[FreeTextEntry1] : Patient has been referred for an ultrasound to rule out a possible blood clot. If the result is negative, the patient will be initiated on a water pill (Lasix) for a week to help reduce the swelling. If the swelling does not resolve after a week, further investigations will be pursued.

## 2024-08-29 NOTE — PHYSICAL EXAM
[Normal] : normal rate, regular rhythm, normal S1 and S2 and no murmur heard [de-identified] : Bilateral edema Right (chronic, stable), Left +2-3 putting up to level of patella tendon

## 2024-09-23 ENCOUNTER — APPOINTMENT (OUTPATIENT)
Dept: FAMILY MEDICINE | Facility: CLINIC | Age: 84
End: 2024-09-23
Payer: MEDICARE

## 2024-09-23 ENCOUNTER — TRANSCRIPTION ENCOUNTER (OUTPATIENT)
Age: 84
End: 2024-09-23

## 2024-09-23 VITALS
TEMPERATURE: 98 F | WEIGHT: 151 LBS | HEART RATE: 110 BPM | HEIGHT: 72 IN | BODY MASS INDEX: 20.45 KG/M2 | OXYGEN SATURATION: 98 %

## 2024-09-23 VITALS — HEART RATE: 102 BPM | SYSTOLIC BLOOD PRESSURE: 152 MMHG | DIASTOLIC BLOOD PRESSURE: 88 MMHG | OXYGEN SATURATION: 98 %

## 2024-09-23 VITALS — SYSTOLIC BLOOD PRESSURE: 166 MMHG | DIASTOLIC BLOOD PRESSURE: 90 MMHG

## 2024-09-23 DIAGNOSIS — B97.89 OTHER SPECIFIED RESPIRATORY DISORDERS: ICD-10-CM

## 2024-09-23 DIAGNOSIS — I50.30 UNSPECIFIED DIASTOLIC (CONGESTIVE) HEART FAILURE: ICD-10-CM

## 2024-09-23 DIAGNOSIS — I10 ESSENTIAL (PRIMARY) HYPERTENSION: ICD-10-CM

## 2024-09-23 DIAGNOSIS — J98.8 OTHER SPECIFIED RESPIRATORY DISORDERS: ICD-10-CM

## 2024-09-23 PROCEDURE — 99214 OFFICE O/P EST MOD 30 MIN: CPT

## 2024-09-23 RX ORDER — ALBUTEROL SULFATE 90 UG/1
108 (90 BASE) INHALANT RESPIRATORY (INHALATION) 3 TIMES DAILY
Qty: 1 | Refills: 0 | Status: COMPLETED | COMMUNITY
Start: 2024-09-23 | End: 2024-09-29

## 2024-09-23 RX ORDER — GUAIFENESIN 400 MG/1
400 TABLET ORAL 3 TIMES DAILY
Qty: 21 | Refills: 0 | Status: COMPLETED | COMMUNITY
Start: 2024-09-23 | End: 2024-09-30

## 2024-09-23 NOTE — REVIEW OF SYSTEMS
[Hearing Loss] : hearing loss [Cough] : cough [Negative] : Neurological [Fever] : no fever [Chills] : no chills [Night Sweats] : no night sweats [Earache] : no earache [Nosebleeds] : no nosebleeds [Postnasal Drip] : no postnasal drip [Nasal Discharge] : no nasal discharge [Sore Throat] : no sore throat [Hoarseness] : no hoarseness [Chest Pain] : no chest pain [Palpitations] : no palpitations [Lower Ext Edema] : no lower extremity edema [Orthopena] : no orthopnea [Wheezing] : no wheezing [Dyspnea on Exertion] : not dyspnea on exertion [Abdominal Pain] : no abdominal pain

## 2024-09-23 NOTE — PHYSICAL EXAM
[Normal] : no acute distress, well nourished, well developed and well-appearing [PERRL] : pupils equal round and reactive to light [EOMI] : extraocular movements intact [Normal Outer Ear/Nose] : the outer ears and nose were normal in appearance [Normal Oropharynx] : the oropharynx was normal [Normal TMs] : both tympanic membranes were normal [No JVD] : no jugular venous distention [No Lymphadenopathy] : no lymphadenopathy [Supple] : supple [No Respiratory Distress] : no respiratory distress  [No Accessory Muscle Use] : no accessory muscle use [Regular Rhythm] : with a regular rhythm [Normal S1, S2] : normal S1 and S2 [No Edema] : there was no peripheral edema [No Extremity Clubbing/Cyanosis] : no extremity clubbing/cyanosis [Soft] : abdomen soft [Normal Supraclavicular Nodes] : no supraclavicular lymphadenopathy [Normal Posterior Cervical Nodes] : no posterior cervical lymphadenopathy [Normal Anterior Cervical Nodes] : no anterior cervical lymphadenopathy [No Focal Deficits] : no focal deficits [Normal Affect] : the affect was normal [Normal Mood] : the mood was normal [de-identified] : Mild expiratory diffuse wheezing.  No rales, no crackles.  [de-identified] : Systolic murmur III/VI, tachycardic

## 2024-09-23 NOTE — HISTORY OF PRESENT ILLNESS
[Family Member] : family member [FreeTextEntry8] : Patient is a 83-year-old male presents for cough.  Patient comes to the office with grandson and patient's daughter attends with video call. Patient reports 1 week of respiratory symptoms initially with sneezing, nasal congestion, and postnasal drip, and days after he started to have cough.  Patient reports that the cough sometimes is wet, and he is able to move up secretions (yellow phlegm) and other times dry cough and feels unable to move up phlegm.  Daughter over the phone report mild shortness of breath when the patient has episodes of cough.   Otherwise, patient and family declined shortness of breath during activity or rest, denies fevers, increasing lower extremity edema.  Patient has been taking vitamin C for the past few days and denies other over-the-counter medications. Granddaughter is also with upper respiratory infection.  Patient and family report that he has been compliant with all his medications.  Recently he has not checked the blood pressure at home since he it has been very well-controlled.  Patient reports always feeling anxious during the medical appointments especially when he has a new doctor and thinks his blood pressure is elevated secondary anxiety. Per chart review patient had TAVR done in June/24.  Daughter reports that patient has been stable with no new complications from volume overload, taking Lasix 20 mg every morning and completed all the cardiology follow ups after procedure.

## 2024-09-28 ENCOUNTER — NON-APPOINTMENT (OUTPATIENT)
Age: 84
End: 2024-09-28

## 2024-10-03 ENCOUNTER — APPOINTMENT (OUTPATIENT)
Dept: FAMILY MEDICINE | Facility: CLINIC | Age: 84
End: 2024-10-03

## 2024-10-28 ENCOUNTER — APPOINTMENT (OUTPATIENT)
Dept: NEUROLOGY | Facility: CLINIC | Age: 84
End: 2024-10-28
Payer: MEDICARE

## 2024-10-28 VITALS
HEART RATE: 102 BPM | HEIGHT: 72 IN | DIASTOLIC BLOOD PRESSURE: 84 MMHG | WEIGHT: 150 LBS | BODY MASS INDEX: 20.32 KG/M2 | SYSTOLIC BLOOD PRESSURE: 172 MMHG

## 2024-10-28 DIAGNOSIS — G31.84 MILD COGNITIVE IMPAIRMENT, SO STATED: ICD-10-CM

## 2024-10-28 PROCEDURE — 99204 OFFICE O/P NEW MOD 45 MIN: CPT

## 2024-10-28 PROCEDURE — G2211 COMPLEX E/M VISIT ADD ON: CPT

## 2024-12-31 ENCOUNTER — NON-APPOINTMENT (OUTPATIENT)
Age: 84
End: 2024-12-31

## 2025-01-04 ENCOUNTER — NON-APPOINTMENT (OUTPATIENT)
Age: 85
End: 2025-01-04

## 2025-01-04 ENCOUNTER — INPATIENT (INPATIENT)
Facility: HOSPITAL | Age: 85
LOS: 4 days | Discharge: HOME HEALTH | DRG: 293 | End: 2025-01-09
Attending: STUDENT IN AN ORGANIZED HEALTH CARE EDUCATION/TRAINING PROGRAM | Admitting: HOSPITALIST
Payer: MEDICARE

## 2025-01-04 VITALS
DIASTOLIC BLOOD PRESSURE: 94 MMHG | HEIGHT: 66 IN | OXYGEN SATURATION: 96 % | SYSTOLIC BLOOD PRESSURE: 160 MMHG | RESPIRATION RATE: 18 BRPM | TEMPERATURE: 98 F | HEART RATE: 112 BPM | WEIGHT: 158.51 LBS

## 2025-01-04 DIAGNOSIS — I50.9 HEART FAILURE, UNSPECIFIED: ICD-10-CM

## 2025-01-04 LAB
ALBUMIN SERPL ELPH-MCNC: 4 G/DL — SIGNIFICANT CHANGE UP (ref 3.3–5.2)
ALP SERPL-CCNC: 86 U/L — SIGNIFICANT CHANGE UP (ref 40–120)
ALT FLD-CCNC: 18 U/L — SIGNIFICANT CHANGE UP
ANION GAP SERPL CALC-SCNC: 17 MMOL/L — SIGNIFICANT CHANGE UP (ref 5–17)
AST SERPL-CCNC: 30 U/L — SIGNIFICANT CHANGE UP
BASOPHILS # BLD AUTO: 0.05 K/UL — SIGNIFICANT CHANGE UP (ref 0–0.2)
BASOPHILS NFR BLD AUTO: 0.5 % — SIGNIFICANT CHANGE UP (ref 0–2)
BILIRUB SERPL-MCNC: 0.4 MG/DL — SIGNIFICANT CHANGE UP (ref 0.4–2)
BUN SERPL-MCNC: 12.3 MG/DL — SIGNIFICANT CHANGE UP (ref 8–20)
CALCIUM SERPL-MCNC: 9.1 MG/DL — SIGNIFICANT CHANGE UP (ref 8.4–10.5)
CHLORIDE SERPL-SCNC: 103 MMOL/L — SIGNIFICANT CHANGE UP (ref 96–108)
CO2 SERPL-SCNC: 21 MMOL/L — LOW (ref 22–29)
CREAT SERPL-MCNC: 0.99 MG/DL — SIGNIFICANT CHANGE UP (ref 0.5–1.3)
EGFR: 75 ML/MIN/1.73M2 — SIGNIFICANT CHANGE UP
EOSINOPHIL # BLD AUTO: 0.04 K/UL — SIGNIFICANT CHANGE UP (ref 0–0.5)
EOSINOPHIL NFR BLD AUTO: 0.4 % — SIGNIFICANT CHANGE UP (ref 0–6)
GAS PNL BLDV: SIGNIFICANT CHANGE UP
GLUCOSE SERPL-MCNC: 249 MG/DL — HIGH (ref 70–99)
HCT VFR BLD CALC: 38.6 % — LOW (ref 39–50)
HGB BLD-MCNC: 12.8 G/DL — LOW (ref 13–17)
IMM GRANULOCYTES NFR BLD AUTO: 0.7 % — SIGNIFICANT CHANGE UP (ref 0–0.9)
LYMPHOCYTES # BLD AUTO: 0.91 K/UL — LOW (ref 1–3.3)
LYMPHOCYTES # BLD AUTO: 10 % — LOW (ref 13–44)
MCHC RBC-ENTMCNC: 33 PG — SIGNIFICANT CHANGE UP (ref 27–34)
MCHC RBC-ENTMCNC: 33.2 G/DL — SIGNIFICANT CHANGE UP (ref 32–36)
MCV RBC AUTO: 99.5 FL — SIGNIFICANT CHANGE UP (ref 80–100)
MONOCYTES # BLD AUTO: 1.01 K/UL — HIGH (ref 0–0.9)
MONOCYTES NFR BLD AUTO: 11.1 % — SIGNIFICANT CHANGE UP (ref 2–14)
NEUTROPHILS # BLD AUTO: 7.06 K/UL — SIGNIFICANT CHANGE UP (ref 1.8–7.4)
NEUTROPHILS NFR BLD AUTO: 77.3 % — HIGH (ref 43–77)
NT-PROBNP SERPL-SCNC: 3699 PG/ML — HIGH (ref 0–300)
PLATELET # BLD AUTO: 225 K/UL — SIGNIFICANT CHANGE UP (ref 150–400)
POTASSIUM SERPL-MCNC: 4 MMOL/L — SIGNIFICANT CHANGE UP (ref 3.5–5.3)
POTASSIUM SERPL-SCNC: 4 MMOL/L — SIGNIFICANT CHANGE UP (ref 3.5–5.3)
PROCALCITONIN SERPL-MCNC: 0.09 NG/ML — SIGNIFICANT CHANGE UP (ref 0.02–0.1)
PROT SERPL-MCNC: 6.8 G/DL — SIGNIFICANT CHANGE UP (ref 6.6–8.7)
RAPID RVP RESULT: SIGNIFICANT CHANGE UP
RBC # BLD: 3.88 M/UL — LOW (ref 4.2–5.8)
RBC # FLD: 13.6 % — SIGNIFICANT CHANGE UP (ref 10.3–14.5)
SARS-COV-2 RNA SPEC QL NAA+PROBE: SIGNIFICANT CHANGE UP
SODIUM SERPL-SCNC: 140 MMOL/L — SIGNIFICANT CHANGE UP (ref 135–145)
WBC # BLD: 9.13 K/UL — SIGNIFICANT CHANGE UP (ref 3.8–10.5)
WBC # FLD AUTO: 9.13 K/UL — SIGNIFICANT CHANGE UP (ref 3.8–10.5)

## 2025-01-04 PROCEDURE — 71046 X-RAY EXAM CHEST 2 VIEWS: CPT | Mod: 26

## 2025-01-04 PROCEDURE — 93010 ELECTROCARDIOGRAM REPORT: CPT

## 2025-01-04 PROCEDURE — 71250 CT THORAX DX C-: CPT | Mod: 26,MC

## 2025-01-04 PROCEDURE — 99285 EMERGENCY DEPT VISIT HI MDM: CPT

## 2025-01-04 PROCEDURE — 99223 1ST HOSP IP/OBS HIGH 75: CPT

## 2025-01-04 RX ORDER — ACETAMINOPHEN 80 MG/.8ML
650 SOLUTION/ DROPS ORAL EVERY 6 HOURS
Refills: 0 | Status: DISCONTINUED | OUTPATIENT
Start: 2025-01-04 | End: 2025-01-09

## 2025-01-04 RX ORDER — GINKGO BILOBA 40 MG
3 CAPSULE ORAL AT BEDTIME
Refills: 0 | Status: DISCONTINUED | OUTPATIENT
Start: 2025-01-04 | End: 2025-01-09

## 2025-01-04 RX ORDER — ACETAMINOPHEN 80 MG/.8ML
650 SOLUTION/ DROPS ORAL ONCE
Refills: 0 | Status: COMPLETED | OUTPATIENT
Start: 2025-01-04 | End: 2025-01-04

## 2025-01-04 RX ORDER — ALBUTEROL SULFATE 90 UG/1
2 INHALANT RESPIRATORY (INHALATION) ONCE
Refills: 0 | Status: COMPLETED | OUTPATIENT
Start: 2025-01-04 | End: 2025-01-04

## 2025-01-04 RX ORDER — ALLOPURINOL 100 MG/1
100 TABLET ORAL DAILY
Refills: 0 | Status: DISCONTINUED | OUTPATIENT
Start: 2025-01-04 | End: 2025-01-09

## 2025-01-04 RX ORDER — ATORVASTATIN CALCIUM 40 MG/1
10 TABLET, FILM COATED ORAL AT BEDTIME
Refills: 0 | Status: DISCONTINUED | OUTPATIENT
Start: 2025-01-04 | End: 2025-01-09

## 2025-01-04 RX ORDER — ONDANSETRON 4 MG/1
4 TABLET ORAL EVERY 8 HOURS
Refills: 0 | Status: DISCONTINUED | OUTPATIENT
Start: 2025-01-04 | End: 2025-01-09

## 2025-01-04 RX ORDER — CEFTRIAXONE SODIUM 1 G/1
1000 INJECTION, POWDER, FOR SOLUTION INTRAMUSCULAR; INTRAVENOUS EVERY 24 HOURS
Refills: 0 | Status: COMPLETED | OUTPATIENT
Start: 2025-01-04 | End: 2025-01-08

## 2025-01-04 RX ORDER — ASPIRIN 81 MG
81 TABLET, DELAYED RELEASE (ENTERIC COATED) ORAL DAILY
Refills: 0 | Status: DISCONTINUED | OUTPATIENT
Start: 2025-01-04 | End: 2025-01-09

## 2025-01-04 RX ORDER — AZITHROMYCIN MONOHYDRATE 200 MG/5ML
500 POWDER, FOR SUSPENSION ORAL EVERY 24 HOURS
Refills: 0 | Status: DISCONTINUED | OUTPATIENT
Start: 2025-01-04 | End: 2025-01-09

## 2025-01-04 RX ORDER — LISINOPRIL 30 MG/1
20 TABLET ORAL DAILY
Refills: 0 | Status: DISCONTINUED | OUTPATIENT
Start: 2025-01-04 | End: 2025-01-09

## 2025-01-04 RX ORDER — CEFTRIAXONE SODIUM 1 G/1
1000 INJECTION, POWDER, FOR SOLUTION INTRAMUSCULAR; INTRAVENOUS ONCE
Refills: 0 | Status: COMPLETED | OUTPATIENT
Start: 2025-01-04 | End: 2025-01-04

## 2025-01-04 RX ORDER — DOXYCYCLINE MONOHYDRATE 100 MG
100 TABLET ORAL ONCE
Refills: 0 | Status: COMPLETED | OUTPATIENT
Start: 2025-01-04 | End: 2025-01-04

## 2025-01-04 RX ORDER — CEFTRIAXONE SODIUM 1 G/1
1000 INJECTION, POWDER, FOR SOLUTION INTRAMUSCULAR; INTRAVENOUS ONCE
Refills: 0 | Status: DISCONTINUED | OUTPATIENT
Start: 2025-01-04 | End: 2025-01-04

## 2025-01-04 RX ORDER — FUROSEMIDE 20 MG
40 TABLET ORAL DAILY
Refills: 0 | Status: DISCONTINUED | OUTPATIENT
Start: 2025-01-04 | End: 2025-01-09

## 2025-01-04 RX ORDER — MAG HYDROX/ALUMINUM HYD/SIMETH 200-200-20
30 SUSPENSION, ORAL (FINAL DOSE FORM) ORAL EVERY 4 HOURS
Refills: 0 | Status: DISCONTINUED | OUTPATIENT
Start: 2025-01-04 | End: 2025-01-09

## 2025-01-04 RX ORDER — SODIUM CHLORIDE 9 MG/ML
500 INJECTION, SOLUTION INTRAMUSCULAR; INTRAVENOUS; SUBCUTANEOUS ONCE
Refills: 0 | Status: COMPLETED | OUTPATIENT
Start: 2025-01-04 | End: 2025-01-04

## 2025-01-04 RX ADMIN — Medication 40 MILLIGRAM(S): at 17:15

## 2025-01-04 RX ADMIN — ALBUTEROL SULFATE 2 PUFF(S): 90 INHALANT RESPIRATORY (INHALATION) at 15:43

## 2025-01-04 RX ADMIN — AZITHROMYCIN MONOHYDRATE 255 MILLIGRAM(S): 200 POWDER, FOR SUSPENSION ORAL at 23:46

## 2025-01-04 RX ADMIN — CEFTRIAXONE SODIUM 1000 MILLIGRAM(S): 1 INJECTION, POWDER, FOR SOLUTION INTRAMUSCULAR; INTRAVENOUS at 20:10

## 2025-01-04 RX ADMIN — SODIUM CHLORIDE 500 MILLILITER(S): 9 INJECTION, SOLUTION INTRAMUSCULAR; INTRAVENOUS; SUBCUTANEOUS at 15:43

## 2025-01-04 RX ADMIN — Medication 100 MILLIGRAM(S): at 15:43

## 2025-01-04 RX ADMIN — ACETAMINOPHEN 650 MILLIGRAM(S): 80 SOLUTION/ DROPS ORAL at 15:43

## 2025-01-04 NOTE — ED PROVIDER NOTE - CLINICAL SUMMARY MEDICAL DECISION MAKING FREE TEXT BOX
83yo M PMHx CHF, aortic stenosis, T2DM, HTN presents to the ED for 5 days of cough and runny nose who has scattered wheezing, tachycardic but saturating well on room air. Concern for CHF vs PNA. Ordered CBC, CMP, proBNP, RVP, CXR. procalcitonin, 85yo M PMHx CHF, aortic stenosis s/p TAVR, T2DM, HTN presents to the ED for 5 days of cough and runny nose who has scattered wheezing, tachycardic but saturating well on room air. Concern for CHF vs PNA. Ordered CBC, CMP, proBNP, RVP, CXR. procalcitonin,

## 2025-01-04 NOTE — H&P ADULT - RESPIRATORY
clear to auscultation bilaterally/no wheezes/no respiratory distress/respirations non-labored no wheezes/no respiratory distress/respirations non-labored/diminished breath sounds, L/diminished breath sounds, R

## 2025-01-04 NOTE — ED PROVIDER NOTE - PHYSICAL EXAMINATION
Bladder scan showed 129ml   
Called [facility] for a consult at this time. Waiting for a call back  
Called [facility] for a consult, they informed me they have no urology coverage at this time. Provider aware   
Called [facility] at this time for a consult and transfer waiting on a call back  
Called [facility] for an update at this time, still waiting on a call back  
Called [facility] at this time to fax a OR report   
Called radiology at this time to [facility]   
Faxed a medical release to [facility] at this time   
Gen: well appearing, no acute distress  Head: normocephalic, atraumatic  EENT: EOMI, moist mucous membranes, no scleral icterus, no JVD  Lung: no increased work of breathing, +scattered wheezing, but speaking in full sentences  CV: +tachycardic, s1/s2, 2+ radial pulses bilaterally  Abd: soft, non-tender, non-distended,  no CVA tenderness  MSK: trace pedal edema, no visible deformities, full range of motion in all 4 extremities  Neuro: Awake, alert, no focal neurologic deficits  Skin: No obvious rash, no jaundice  Psych: normal affect, normal speech

## 2025-01-04 NOTE — ED ADULT NURSE NOTE - NSICDXPASTMEDICALHX_GEN_ALL_CORE_FT
PAST MEDICAL HISTORY:  Anxiety     COVID-19 vaccine series completed     Diabetes     Gout     H/O congestive heart failure     HLD (hyperlipidemia)     HTN (hypertension)     Nonrheumatic aortic valve stenosis

## 2025-01-04 NOTE — ED PROVIDER NOTE - ATTENDING CONTRIBUTION TO CARE
I, Robi Bonilla, performed a face to face bedside interview with this patient regarding history of present illness, and completed an independent physical examination. I personally made/approved the management plan and take responsibility for the patient management. I have communicated the patient’s plan of care and disposition with the resident  84 year oi with PMh CHF, DM, AS presents with 5 days of cough and ROGERS  Gen: NAD, well appearing  CV: RRR  Pul: CTA b/l  Abd: Soft, non-distended, non-tender  Neuro: no focal deficits  Pt with bibasilar effusions/congestion, elevated BNP, admitted for CHF exacerbation

## 2025-01-04 NOTE — H&P ADULT - NSHPSOCIALHISTORY_GEN_ALL_CORE
Detail Level: Zone
Detail Level: Detailed
Remote cigarette used, no alcohol or illicit drug use. , lives with family.

## 2025-01-04 NOTE — H&P ADULT - TIME BILLING
chart, labs and imaging reviewed. Physical examination, medication reconciliation and documentation. Discussion with patient, daughter at bedside and ER nurse.

## 2025-01-04 NOTE — ED PROVIDER NOTE - OBJECTIVE STATEMENT
86yo M 85yo M PMHx CHF, aortic stenosis, T2DM, HTN presents to the ED for 5 days of cough and runny nose. Patient went to urgent care, was given tessalon perle without relief, returned and was told he had wheezing on exam was then sent to ED. Patient states that he got his flu vaccine and tried cough syrup without improvement. Some chills yesterday and some leg swelling. No fever. no nausea. no diarrhea 85yo M PMHx CHF, aortic stenosis s/p TAVR, T2DM, HTN presents to the ED for 5 days of cough and runny nose. Patient went to urgent care, was given tessalon perle without relief, returned and was told he had wheezing on exam was then sent to ED. Patient states that he got his flu vaccine and tried cough syrup without improvement. Some chills yesterday and some leg swelling. No fever. no nausea. no diarrhea

## 2025-01-04 NOTE — ED PROVIDER NOTE - NS ED ATTENDING STATEMENT MOD
Dr. Boudreaux    See pt message    She was seen in ED, no DVT per the US  No mention of broken leg in ED report    PT referral cued  Spoke with pt she would likje to go out for PT rather than have home care, I did discuss that option with her and her       Francisca Perry, RN   [facility]         Attending with

## 2025-01-04 NOTE — ED ADULT NURSE NOTE - COVID-19 ORDERING FACILITY
[facility]  [facility]

## 2025-01-04 NOTE — ED PROVIDER NOTE - PATIENT'S SEXUAL ORIENTATION
1. Urine specimen today for complete urinalysis and urine culture    2.   Intake voiding diary for 3 separate days and nights --please do this about 10-14 days before your next visit with me and please bring in the filled out forms with you for your office
Heterosexual

## 2025-01-04 NOTE — H&P ADULT - NSHPPHYSICALEXAM_GEN_ALL_CORE
Vital Signs Last 24 Hrs  T(C): 36.9 (04 Jan 2025 19:54), Max: 36.9 (04 Jan 2025 19:54)  T(F): 98.4 (04 Jan 2025 19:54), Max: 98.4 (04 Jan 2025 19:54)  HR: 101 (04 Jan 2025 19:54) (101 - 112)  BP: 150/91 (04 Jan 2025 19:54) (150/76 - 160/94)  BP(mean): --  RR: 20 (04 Jan 2025 19:54) (16 - 20)  SpO2: 95% (04 Jan 2025 19:54) (95% - 96%)    Parameters below as of 04 Jan 2025 19:54  Patient On (Oxygen Delivery Method): room air

## 2025-01-04 NOTE — ED ADULT NURSE NOTE - OBJECTIVE STATEMENT
Pt A&Ox4, resp wnl, Pt presents to the ED for a week of cough and congestion. Pt states he went to  and was then sent to the ED for XR for possible pneumonia. Pt XR confirms PNA. Pt medicated per orders. Pt denies CP, fever, chills, n/v/d, recent travel, sick contacts. Pt took tessalon perle and took no other medications PTA. PMHx HTN, HLD, CHF.

## 2025-01-04 NOTE — H&P ADULT - HISTORY OF PRESENT ILLNESS
85 y/o male with PMH of CHF, aortic stenosis, DM-2, HTN, HLD came to the ED complaining of cough, running nose x 5 days. He was seen at the urgent care, given Tessalon perle, used but with no relief.     presents to the ED for 5 days of cough and runny nose. Patient went to urgent care, was given tessalon perle without relief, returned and was told he had wheezing on exam was then sent to ED. Patient states that he got his flu vaccine and tried cough syrup without improvement. Some chills yesterday and some leg swelling. No fever. no nausea. no diarrhea Patient seen and examined before midnight .     85 y/o male with PMH of aortic stenosis s/p TAVR, DM-2, HTN, HLD was sent to the ED from urgent care for evaluation. As per patient and daughter at bed side, patient has been coughing x 1.5weeks. Noted cough sometimes productive of yellow sputum. He was seen at urgent care at the onset, was given cough suppressant but with no relief. Cough keeps him up at night. He retuned to urgent care today, there was a concerned for fluid in his lung so sent to the ED for eval. He has no fever, chills, shortness of breath, palpitation, abdominal pain, change in bowel/urinary habit, sick contact, recent travel.

## 2025-01-04 NOTE — H&P ADULT - ASSESSMENT
Right middle lobe consolidation. Additional bilateral lung nodular   groundglass opacities. Interlobular septal thickening. Differential   includes infection and pulmonary edema. Malignancy cannot be entirely   excluded and follow-up is recommended in 8-12 weeks to assess resolution.    Bilateral moderate pleural effusions, right greater left with adjacent   atelectasis      HTN/HLD   Lisinopril 20mg   Amlodipine 5mg   Simvastatin 40mg   Aspirin 81mg     GERD   PPI 40mg     Gout   Allopurinol 100mg       DM-2   Hold PO medications:   Metformin 1000mg bid   Januvia 100mg   Glimepiride 1mg   Insulin sliding scale     Supportive   DVT prophylaxis:   Diet:     Plan of care discussed with patient and ER nurse.  83 y/o male with PMH of aortic stenosis s/p TAVR, DM-2, HTN, HLD was sent to the ED from urgent care for evaluation; reported 1.5weeks of cough sometimes productive of yellow sputum. In the ED, CT chest:  Right middle lobe consolidation. Additional bilateral lung nodular groundglass opacities. Interlobular septal thickening. Differential includes infection and pulmonary edema. Malignancy cannot be entirely excluded. Bilateral moderate pleural effusions, R>L with adjacent atelectasis    RML consolidation   Admit to telemetry   CT chest as noted above   Antibiotic started in the ED, will continue   Mucinex bid for cough   Oxygen therapy as needed   Incentive spirometry   Possible malignancy can not be ruled out, this was discussed with patient and daughter at bed side, advised to follow up with PCP for repeat CT chest in 8-12 weeks and subsequently pulmonology.     B/l pleural effusion-moderate   As noted on CT chest as above   As per ED, CT surgery consulted   Will continue Lasix, monitor renal function     Possible pulmonary edema   BNP: 3699  As per ED, cardiology consulted   Rec, diuretic and echo   Cardiology on board   Oxygen therapy as needed     Hyperglycemia with underlying DM-2   Serum glucose: 249  Hold PO medications:   Metformin 1000mg bid   Januvia 100mg   Glimepiride 1mg   Insulin sliding scale     HTN/HLD   Lisinopril 20mg   Amlodipine 5mg   Simvastatin 20mg   Aspirin 81mg   Monitor BP    Gout   Allopurinol 100mg     Supportive   DVT prophylaxis: Lovenox   Diet: CHO     Plan of care discussed with patient, daughter at bedside and ER nurse.

## 2025-01-04 NOTE — ED PROVIDER NOTE - BIRTH SEX
Subjective   Ta Madison is a 56 y.o. male.     Chief Complaint: Rash (bilateral arms)    Insect Bite   This is a new problem. The current episode started in the past 7 days. Associated symptoms include a rash. Pertinent negatives include no chills, fever, headaches, joint swelling, myalgias, nausea, sore throat, swollen glands or vomiting. Associated symptoms comments: itching. Nothing aggravates the symptoms. Treatments tried: otc creams; vistaril. The treatment provided no relief.   Pt states that he was out in the woods on Sunday and itching started on his arms the same day.  Itching has not resolved and he is scratching until the areas become excoriated.  Can't sleep at  Night due to itching     Family History   Problem Relation Age of Onset   • Nephrolithiasis Mother    • Heart disease Father    • Hypertension Father    • Kidney disease Father        Social History     Social History   • Marital status:      Spouse name: N/A   • Number of children: N/A   • Years of education: N/A     Occupational History   • Not on file.     Social History Main Topics   • Smoking status: Current Every Day Smoker     Packs/day: 1.00     Types: Cigarettes   • Smokeless tobacco: Never Used   • Alcohol use No   • Drug use: Yes     Types: Marijuana      Comment: occ    • Sexual activity: Defer     Other Topics Concern   • Not on file     Social History Narrative   • No narrative on file       Past Medical History:   Diagnosis Date   • Increased heart rate    • Mini stroke (CMS/HCC)    • Tobacco abuse        Review of Systems   Constitutional: Negative.  Negative for chills and fever.   HENT: Negative.  Negative for sore throat.    Respiratory: Negative.    Cardiovascular: Negative.    Gastrointestinal: Negative.  Negative for nausea and vomiting.   Musculoskeletal: Negative.  Negative for joint swelling and myalgias.   Skin: Positive for rash.   Neurological: Negative.  Negative for headaches.   Psychiatric/Behavioral:  "Negative.        Objective   Physical Exam   Constitutional: He is oriented to person, place, and time. He appears well-developed and well-nourished.   Neck: Normal range of motion. Neck supple.   Cardiovascular: Normal rate, regular rhythm and normal heart sounds.    Pulmonary/Chest: Effort normal and breath sounds normal.   Neurological: He is alert and oriented to person, place, and time.   Skin: Skin is warm and dry.   Multiple insect bites to bilateral forearms   Psychiatric: He has a normal mood and affect. His behavior is normal. Judgment and thought content normal.   Nursing note and vitals reviewed.      Procedures    Vitals: Blood pressure 130/70, pulse 83, temperature 99.2 °F (37.3 °C), temperature source Oral, height 175.3 cm (69\"), weight 81.2 kg (179 lb), SpO2 98 %.    Allergies: No Known Allergies     During this visit the following were done:  Labs Reviewed []    Labs Ordered []    Radiology Reports Reviewed []    Radiology Ordered []    PCP Records Reviewed []    Referring Provider Records Reviewed []    ER Records Reviewed []    Hospital Records Reviewed []    History Obtained From Family []    Radiology Images Reviewed []    Other Reviewed []    Records Requested []      Assessment/Plan   Ta was seen today for rash.    Diagnoses and all orders for this visit:    Insect bite, initial encounter  -     dexamethasone (DECADRON) injection 8 mg; Inject 2 mL into the appropriate muscle as directed by prescriber 1 (One) Time.  -     cetirizine (zyrTEC) 10 MG tablet; Take 1 tablet by mouth Daily.               " Male

## 2025-01-04 NOTE — ED ADULT TRIAGE NOTE - CHIEF COMPLAINT QUOTE
pt c/o of cough and flu like symptoms x 5 days, was seen at , was given tessalon perle with no relief. went back to  this morning and was  sent to ED to [facility]. denies n/v/d no fever or chills.

## 2025-01-05 ENCOUNTER — NON-APPOINTMENT (OUTPATIENT)
Age: 85
End: 2025-01-05

## 2025-01-05 DIAGNOSIS — J90 PLEURAL EFFUSION, NOT ELSEWHERE CLASSIFIED: ICD-10-CM

## 2025-01-05 LAB
ANION GAP SERPL CALC-SCNC: 13 MMOL/L — SIGNIFICANT CHANGE UP (ref 5–17)
BUN SERPL-MCNC: 14.8 MG/DL — SIGNIFICANT CHANGE UP (ref 8–20)
CALCIUM SERPL-MCNC: 8.6 MG/DL — SIGNIFICANT CHANGE UP (ref 8.4–10.5)
CHLORIDE SERPL-SCNC: 102 MMOL/L — SIGNIFICANT CHANGE UP (ref 96–108)
CO2 SERPL-SCNC: 23 MMOL/L — SIGNIFICANT CHANGE UP (ref 22–29)
CREAT SERPL-MCNC: 0.95 MG/DL — SIGNIFICANT CHANGE UP (ref 0.5–1.3)
EGFR: 78 ML/MIN/1.73M2 — SIGNIFICANT CHANGE UP
GLUCOSE BLDC GLUCOMTR-MCNC: 167 MG/DL — HIGH (ref 70–99)
GLUCOSE BLDC GLUCOMTR-MCNC: 199 MG/DL — HIGH (ref 70–99)
GLUCOSE BLDC GLUCOMTR-MCNC: 240 MG/DL — HIGH (ref 70–99)
GLUCOSE BLDC GLUCOMTR-MCNC: 248 MG/DL — HIGH (ref 70–99)
GLUCOSE BLDC GLUCOMTR-MCNC: 84 MG/DL — SIGNIFICANT CHANGE UP (ref 70–99)
GLUCOSE SERPL-MCNC: 189 MG/DL — HIGH (ref 70–99)
GRAM STN FLD: SIGNIFICANT CHANGE UP
HCT VFR BLD CALC: 36.2 % — LOW (ref 39–50)
HGB BLD-MCNC: 12 G/DL — LOW (ref 13–17)
MCHC RBC-ENTMCNC: 32.8 PG — SIGNIFICANT CHANGE UP (ref 27–34)
MCHC RBC-ENTMCNC: 33.1 G/DL — SIGNIFICANT CHANGE UP (ref 32–36)
MCV RBC AUTO: 98.9 FL — SIGNIFICANT CHANGE UP (ref 80–100)
MRSA PCR RESULT.: SIGNIFICANT CHANGE UP
PLATELET # BLD AUTO: 207 K/UL — SIGNIFICANT CHANGE UP (ref 150–400)
POTASSIUM SERPL-MCNC: 4 MMOL/L — SIGNIFICANT CHANGE UP (ref 3.5–5.3)
POTASSIUM SERPL-SCNC: 4 MMOL/L — SIGNIFICANT CHANGE UP (ref 3.5–5.3)
RBC # BLD: 3.66 M/UL — LOW (ref 4.2–5.8)
RBC # FLD: 13.5 % — SIGNIFICANT CHANGE UP (ref 10.3–14.5)
S AUREUS DNA NOSE QL NAA+PROBE: SIGNIFICANT CHANGE UP
S PNEUM AG UR QL: NEGATIVE — SIGNIFICANT CHANGE UP
SODIUM SERPL-SCNC: 138 MMOL/L — SIGNIFICANT CHANGE UP (ref 135–145)
SPECIMEN SOURCE: SIGNIFICANT CHANGE UP
WBC # BLD: 10.69 K/UL — HIGH (ref 3.8–10.5)
WBC # FLD AUTO: 10.69 K/UL — HIGH (ref 3.8–10.5)

## 2025-01-05 PROCEDURE — 99221 1ST HOSP IP/OBS SF/LOW 40: CPT

## 2025-01-05 PROCEDURE — 99223 1ST HOSP IP/OBS HIGH 75: CPT

## 2025-01-05 PROCEDURE — 99233 SBSQ HOSP IP/OBS HIGH 50: CPT

## 2025-01-05 RX ORDER — SODIUM CHLORIDE 9 MG/ML
1000 INJECTION, SOLUTION INTRAVENOUS
Refills: 0 | Status: DISCONTINUED | OUTPATIENT
Start: 2025-01-05 | End: 2025-01-09

## 2025-01-05 RX ORDER — DEXTROSE MONOHYDRATE 25 G/50ML
12.5 INJECTION, SOLUTION INTRAVENOUS ONCE
Refills: 0 | Status: DISCONTINUED | OUTPATIENT
Start: 2025-01-05 | End: 2025-01-09

## 2025-01-05 RX ORDER — BENZONATATE 100 MG
100 CAPSULE ORAL EVERY 8 HOURS
Refills: 0 | Status: DISCONTINUED | OUTPATIENT
Start: 2025-01-05 | End: 2025-01-09

## 2025-01-05 RX ORDER — DEXTROSE MONOHYDRATE 25 G/50ML
25 INJECTION, SOLUTION INTRAVENOUS ONCE
Refills: 0 | Status: DISCONTINUED | OUTPATIENT
Start: 2025-01-05 | End: 2025-01-09

## 2025-01-05 RX ORDER — DEXTROSE MONOHYDRATE 25 G/50ML
15 INJECTION, SOLUTION INTRAVENOUS ONCE
Refills: 0 | Status: DISCONTINUED | OUTPATIENT
Start: 2025-01-05 | End: 2025-01-09

## 2025-01-05 RX ORDER — ENOXAPARIN SODIUM 60 MG/.6ML
40 INJECTION INTRAVENOUS; SUBCUTANEOUS EVERY 24 HOURS
Refills: 0 | Status: DISCONTINUED | OUTPATIENT
Start: 2025-01-05 | End: 2025-01-09

## 2025-01-05 RX ORDER — GLUCAGON INJECTION, SOLUTION 0.5 MG/.1ML
1 INJECTION, SOLUTION SUBCUTANEOUS ONCE
Refills: 0 | Status: DISCONTINUED | OUTPATIENT
Start: 2025-01-05 | End: 2025-01-09

## 2025-01-05 RX ORDER — INSULIN LISPRO 100/ML
VIAL (ML) SUBCUTANEOUS AT BEDTIME
Refills: 0 | Status: DISCONTINUED | OUTPATIENT
Start: 2025-01-05 | End: 2025-01-09

## 2025-01-05 RX ORDER — GUAIFENESIN 100 MG/5ML
600 SYRUP ORAL EVERY 12 HOURS
Refills: 0 | Status: DISCONTINUED | OUTPATIENT
Start: 2025-01-05 | End: 2025-01-09

## 2025-01-05 RX ORDER — INSULIN LISPRO 100/ML
VIAL (ML) SUBCUTANEOUS
Refills: 0 | Status: DISCONTINUED | OUTPATIENT
Start: 2025-01-05 | End: 2025-01-09

## 2025-01-05 RX ORDER — SODIUM CHLORIDE 9 MG/ML
3 INJECTION, SOLUTION INTRAMUSCULAR; INTRAVENOUS; SUBCUTANEOUS EVERY 6 HOURS
Refills: 0 | Status: DISCONTINUED | OUTPATIENT
Start: 2025-01-05 | End: 2025-01-09

## 2025-01-05 RX ADMIN — ALLOPURINOL 100 MILLIGRAM(S): 100 TABLET ORAL at 12:32

## 2025-01-05 RX ADMIN — Medication 600 MILLIGRAM(S): at 17:11

## 2025-01-05 RX ADMIN — CEFTRIAXONE SODIUM 1000 MILLIGRAM(S): 1 INJECTION, POWDER, FOR SOLUTION INTRAMUSCULAR; INTRAVENOUS at 22:18

## 2025-01-05 RX ADMIN — Medication 40 MILLIGRAM(S): at 05:29

## 2025-01-05 RX ADMIN — Medication 5 MILLIGRAM(S): at 05:29

## 2025-01-05 RX ADMIN — Medication 4: at 07:08

## 2025-01-05 RX ADMIN — ATORVASTATIN CALCIUM 10 MILLIGRAM(S): 40 TABLET, FILM COATED ORAL at 22:18

## 2025-01-05 RX ADMIN — Medication 81 MILLIGRAM(S): at 12:32

## 2025-01-05 RX ADMIN — Medication 4: at 18:10

## 2025-01-05 RX ADMIN — AZITHROMYCIN MONOHYDRATE 255 MILLIGRAM(S): 200 POWDER, FOR SUSPENSION ORAL at 23:18

## 2025-01-05 RX ADMIN — Medication 600 MILLIGRAM(S): at 05:29

## 2025-01-05 RX ADMIN — LISINOPRIL 20 MILLIGRAM(S): 30 TABLET ORAL at 05:29

## 2025-01-05 RX ADMIN — Medication 2: at 12:49

## 2025-01-05 RX ADMIN — Medication 3 MILLIGRAM(S): at 22:18

## 2025-01-05 NOTE — CONSULT NOTE ADULT - PROVIDER SPECIALTY LIST ADULT
CARDIOLOGY OFFICE NOTE        ORIGINAL REASON FOR CONSULT:  HFrEF (3/23/21)  Angel Soliman MD   [address]  [facility] [address]  Ashwin Shelton is a 67 year old male with the following issues: CARDIAC INVESTIGATIONS/IMAGING   1. Age 67  2. CAD/prior inferior MI, s/p CABG x1 (SVG to RCA), NSTEMI s/p PCI to OM2 (12/2017), prior stents to LAD, D1  3. ICMP / HFmrEF (lowest EF 38% on 3/23/21)  4. HTN / Dyslipidemia  5. Bradycardia with hx 1st deg AVB, and 2nd deg Mobitz 1 AVB (improved off BB) - tolerates Nebivolol  6. PAF (Dx 4/28/21)= new diagnosis  7. Acute ischemic stroke R MCA territory (embolic?) / left facial droop, left visual deficit (3/21/2021).  Hemorrhagic transformation (3/30/21).  8. 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 PFO (3/24/21 CONSTANZA: bidirectional shunt, right-to-left shunting with EVERY beat using agitated saline contrast)  10. DM 2     NT proBNP: 303 (3/24/21)  10 year ASCVD risk: N/A, known CAD  [id number] Score: N/A  H2FpEF Score: 3 (HTN, age, E/e')  NYHA FC: 1-2  ROPE Score: 4 1. ECHO (10/31/21): LVEF 40%, RVSP 29 mmHg, E/e' 12.13. (4/1/21): LVEF 45%, E/e' not reported (previously 9.86), RVSP not reported  2. CONSTANZA (3/24/21): PFO with bidirectional shunt, right-to-left shunting with EVERY beat using agitated saline contrast  3. STRESS TEST: None  4. CARDIAC CATH - [facility] (12/8/2017): LVEF 30%, 50% ISR mLAD, 90% dLAD, 90% ISR D1, 50% mLCX, 99% dLCX, 90% OM2, 100% RCA with patent SVG-RCA, 50% rPDA. Diffuse CAD detailed in complete cath report (see report for details). Intervention: PTCA and stent to mid OM2.  5. ECG (11/10/21): Atrial Flutter, inferior infarct.   6. Cardiac event monitor 04/28/2021: HR  bpm, average 59 bpm, PVC <1%, SVE 5%, Symptoms associated with AF, 20 AF episodes (longest episode 4 minutes), total AF burden 1%, 4 episodes of 2nd degree HB  7. CTA thoracic aorta (3/24/21): no thoracic aortic dissection, intramural hematoma, 
Cardiology
or aneurysm.  8. US carotids (3/31/21): negative  9. US LE venous duplex (3/31/21): negative for DVT  10. GI workup: None     Ashwin Shelton presents in follow up after 06/01/2021.    At that time, patient had denied cardiac complaints. Losartan was exchanged for Entresto. Patient was advised to obtain labs and ambulatory BP. Due to elevated creatinine 09/01/21, patient was advised to hold Entresto and Aldactone for 1 week with repeat labs in 1 week (via voicemail) however had not answered phone nor called our office back - detailed instructions left on voicemail.    Crestor 40 mg daily initiated by Neurology 09/01/21.    She was unfortunately admitted to [facility] 10/31-11/12/2021 following multiple seizures (new). She was hypertensive upon arrival to the ED (220/100 mmHg). CTA Head/Neck & MRI brain were negative for acute changes. She was provided a dose of IV Keppra and was then diagnosed with Percy's paralysis. Despite Keppra load, patient continued to have seizures, thus was intubated in the ED and transferred to the Neuro ICU where he was monitored on continuous EEG. Seizures were related to hypertensive crisis and prior CVA. Admission further complicated by hypotension requiring temporary pressor support (related to oversedation). Echo demonstrated LVEF of 40%He was discharged to [facility].    Today, ***. Patient at this time denies history of orthopnea, paroxysmal nocturnal dyspnea, bendopnea, palpitations, lightheadedness/dizziness, presyncope/syncope, edema and any anginal type chest pain.    ECHOCARDIOGRAPHIC TREND     Date   LVEF  12/2017 ([facility]) Quantitative 56%, qualitative 45%  10/2019   51%  03/2021   39%  04/01/21  45%  10/31/21  40%    CARDIAC MEDICATION CHANGES     PTA: clonidine 0.2 patch, nebivolol 10mg daily, ASA 81mg daily, atorvastatin 80mg, plavix 75mg, imdur 30mg, fenofibrate 145mg    4/2/21 discharge meds: ASA 81mg, atorvastatin 80mg, clopidogrel 75mg, empagliflozin, fenofibrate, losartan 12.5mg, 
nebivolol 5mg, SL NTG, spironolactone 12.5mg    ACTIVITY LEVEL     Walks at home.  Participating in physical therapy.  Denies any cardiac limitations to physical activity.    REVIEW OF SYSTEMS     Negative other than what has been discussed/reported in the history.    HOSPITALIZATIONS / ED VISITS     3/20/21 - 3/26/21 acute ischemic stroke R MCA, PFO with significant right-to-left shunting, hypertensive urgency, HFrEF 38%.    3/29/21 - 4/2/21 uncontrolled HTN, hemorrhagic transformation of ischemic stroke, LVEF up to 45%    [facility] 10/31-11/12/2021 following multiple seizures (new). She was hypertensive upon arrival to the ED (220/100 mmHg). CTA Head/Neck & MRI brain were negative for acute changes. She was provided a dose of IV Keppra and was then diagnosed with Percy's paralysis. Despite Keppra load, patient continued to have seizures, thus was intubated in the ED and transferred to the Neuro ICU where he was monitored on continuous EEG. Seizures were related to hypertensive crisis and prior CVA. Admission further complicated by hypotension requiring temporary pressor support (related to oversedation). Echo demonstrated LVEF of 40%He was discharged to [facility].    PERSONAL/SOCIAL HISTORY     - Tobacco: former tobacco use, quit 2017  - Alcohol: none  - Illicit drug use: none  - Living: lives in a house with a friend  - Occupation: retired, formerly worked as a     FAMILY HISTORY     No known family history of coronary artery disease or stroke    SURGICAL HISTORY     CABG, unknown date - see details above    JG RISK ASSESSMENT     N/A    PAD/AAA RISK ASSESSMENT (ULTRASOUND/ABIs)     4/30/2019 CT abd/pelvis ([place]): no AAA noted. There are extensive atherosclerotic calcifications and noncalcified plaque of the abdominal aorta and its branches.  03/24/2021 CTA thoracic aorta: no thoracic aortic aneurysm    CURRENT MEDICATIONS     Current Outpatient Medications   Medication Sig Dispense Refill   • levetiracetam 
(KEPPRA) 750 MG tablet Take 1 tablet by mouth every 12 hours. 60 tablet 0   • sacubitril-valsartan (ENTRESTO) 24-26 MG per tablet Take one-HALF tablet by mouth 2 times daily. 30 tablet 0   • spironolactone (ALDACTONE) 25 MG tablet Take one-HALF tablet by mouth daily. Do not start before November 18, 2021. 30 tablet 0   • Alcohol Swabs (Pure Comfort Alcohol Prep) Pads      • atorvastatin (LIPITOR) 80 MG tablet Take 80 mg by mouth daily.      • diclofenac (VOLTAREN) 1 % gel APPLY TO THE AFFECTED AREA(S) TWO GRAM FOUR TIMES DAILY DAILY     • FREESTYLE LITE test strip USE TO TEST BLOOD SUGAR TWICE DAILY     • Easy Comfort Pen Needles 31G X 5 MM Misc USE with insulin TWICE DAILY     • isosorbide mononitrate (IMDUR) 120 MG 24 hr tablet Take 120 mg by mouth daily.      • Pro Comfort Lancets 30G Misc USE TO TEST BLOOD SUGAR TWICE DAILY     • apixaBAN (ELIQUIS) 5 MG Tab Take 1 tablet by mouth every 12 hours. 60 tablet 11   • empagliflozin (Jardiance) 10 MG tablet Take 1 tablet by mouth daily (before breakfast). 30 tablet 1   • linaGLIPtin (TRADJENTA) 5 MG tablet Take 1 tablet by mouth daily. Do not start before March 27, 2021. 30 tablet 0   • insulin glargine 100 UNIT/ML pen-injector Inject 10 Units into the skin daily. Do not start before March 27, 2021. Prime 2 units before each dose. 15 mL 12   • ammonium lactate (LAC-HYDRIN) 12 % cream Apply 1 application topically as needed for Dry Skin.      • nitroGLYcerin (NITROSTAT) 0.4 MG sublingual tablet Place 0.4 mg under the tongue every 5 minutes as needed for Chest pain.     • pantoprazole (PROTONIX) 40 MG tablet Take 40 mg by mouth daily.     • tamsulosin (FLOMAX) 0.4 MG Cap Take 0.4 mg by mouth daily after a meal.       No current facility-administered medications for this visit.     PHYSICAL EXAMINATION     [facility] Extended Vitals - Weight in Kg/Lb 11/17/2021 11/17/2021 11/17/2021 11/18/2021 11/18/2021   BP - 118/54 132/60 - 148/70   Pulse 60 - 55 65 64   Resp 16 - - 16 - 
  Temp 99.1 - 98.5 97.6 -   Weight kg 73 kg - - 73.5 kg -   Weight lb 160 lb 15 oz - - 162 lb 0.6 oz -   Height - - - - -   Height cm - - - - -   BMI 25.98 - - 26.15 -   Pulse Ox 93 - 99 97 -   Patient Position - Semi-Daugherty's Semi-Daugherty's - Sitting   BP Location - LUE - Left upper extremity LUE - Left upper extremity - RUE - Right upper extremity   Cuff Size - - - - -     General : No acute distress  HEENT: PERRL, Normocephalic/atraumatic, clear oropharynx  Neck: Supple, FROM, No LAP/Thyromegaly. Trachea central. No JVD (jugular vein distention) or carotid bruit.  CVS: S1, S2 normal. No M/R/G  Pulm: Clear to auscultation/percussion. No Wheeze/Rhonchi/Rales  Ext: No cyanosis/clubbing/edema  Skin: No rash/palpable nodules    LABORATORY     Recent Labs   Lab 11/15/21  0511 11/13/21  0554 11/12/21  0324 11/11/21  0351 11/11/21  0351 11/01/21  2136 11/01/21  0345 10/31/21  1117 10/31/21  1106 08/31/21  0757 03/30/21  0334 03/29/21  2127 03/25/21  0909 03/24/21  0538 03/22/21  0532 03/21/21  0447 03/20/21  2308 03/20/21  2305 03/20/21  2301   HGB  --  10.3* 9.2*  --  9.4*   < > 12.9*   < > 15.0  --    < >  --    < >  --    < > 12.8*   < >  --  12.0*   PLT  --  317 249  --  246   < > 283   < > 358  --    < >  --    < >  --    < > 276   < >  --  296   Sodium 140 138 142   < > 143   < > 140   < > 139 138   < >  --    < > 135   < >  --   --   --  135   Potassium 4.4 4.9 4.4   < > 4.0   < > 4.0   < > 3.0* 4.3   < >  --    < > 4.3   < >  --   --   --  4.2   BUN 34* 30* 36*   < > 36*   < > 26*   < > 16 37*   < >  --    < > 52*   < >  --   --   --  32*   Creatinine 1.17 1.34* 1.49*   < > 1.53*   < > 1.52*   < > 1.04 1.95*   < >  --    < > 1.53*   < >  --    < >  --  1.25*   Troponin I, Ultra Sensitive  --   --   --   --   --   --   --   --   --   --   --   --   --   --   --   --   --   --  <0.02   NT-proBNP  --   --   --   --   --   --   --   --  587* 488*  --   --   --  303*  --   --   --   --   --    TSH  --   --   --   --   
--   --   --   --   --   --   --  0.843  --   --   --   --   --   --   --    Hemoglobin A1C  --   --   --   --   --   --   --   --  6.7*  --   --   --   --   --   --   --   --   --  10.7*   Cholesterol  --   --   --   --   --   --  217*  --   --  174  --   --   --   --   --  264*  --   --   --    HDL  --   --   --   --   --   --  44  --   --  39*  --   --   --   --   --  44  --   --   --    Cholesterol/ HDL Ratio  --   --   --   --   --   --  4.9*  --   --  4.5*  --   --   --   --   --  6.0*  --   --   --    CALCLDL  --   --   --   --   --   --  155*  --   --  104  --   --   --   --   --  183*  --   --   --    Triglycerides  --   --   --   --   --   --  92  --   --  155*  --   --   --   --   --  186*  --   --   --    INR  --   --   --   --   --   --   --   --  1.0  --   --   --   --   --   --   --   --  1.0  --     < > = values in this interval not displayed.          Lab Results   Component Value Date    CHOLESTEROL 217 (H) 11/01/2021    TRIGLYCERIDE 92 11/01/2021    HDL 44 11/01/2021    CALCLDL 155 (H) 11/01/2021     ECHO 10/31/21     Calculated EF , 40% significant beat to beat variation , with global hypokinesis.  No significant valve abnormalities.  Mildly to moderately reduced RV function.  Compared to prior study in 4/2021, calculated EF is lower ( 40% vs 45% ).    UPCOMING APPOINTMENTS     Future Appointments   Date Time Provider Department Center   12/21/2021  2:45 PM MD VIKRAM [id number]   1/27/2022  1:00 PM VINAY Henriquez [facility]   3/2/2022  9:30 AM VINAY Henriquez [facility]       ASSESSMENT AND PLAN     Ashwin Shelton is a 67 year old male with the following cardiovascular profile:    1. Age 67  2. Coronary artery disease / prior non-ST elevation myocardial infarction, with history of coronary artery bypass surgery and stenting  3. Heart failure with mildly reduced ejection fraction / chronic systolic and diastolic heart failure / ischemic 
Thoracic Surgery
cardiomyopathy  4. Hypertension / dyslipidemia  5. History of bradycardia (1st and 2nd deg Mobitz 1 AVB) on BB, but tolerates Nebivolol  6. Acute ischemic stroke R MCA territory, with PFO and significant right-to-left shunting  7. Type 2 diabetes mellitus    Middle-aged male with coronary artery disease / prior MI, ischemic cardiomyopathy, uncontrolled hypertension, uncontrolled diabetes, and recent acute ischemic stroke (3/2021).  Acute ischemic stroke= likely cardio-embolic in nature.   Cardiomyopathy / reduced LVEF is likely chronic.  High BP in office= white coat hypertension    RECOMMENDATIONS     1. Stop losartan   2. Start entresto 49/51mg BID  3. ABPM in two weeks  4. Repeat blood work in 10 days (BNP & BMP)  5. Will discuss event monitor with Dr. Hayes    Follow up in 6 weeks    Mic ZIMMERMAN, RN attest that I performed the duties of scribe in the presence of YASEMIN Osborne MD.

## 2025-01-05 NOTE — CHART NOTE - NSCHARTNOTEFT_GEN_A_CORE
PHI: 84M, pmhx HTN, HLD, gout, diabetes, anxiety, AS s/p TAVR 6/2024, chronic diastolic HF, with week long h/o productive cough, subjective fevers, chills, seen in Urgent care and referred to ED to be evaluated for possible pneumonia and pleural effusions, in ED CT chest with mod R and small L effusion, thoracic surgery consulted for possible drainage. pt states he has productive cough with yellow phelgm, no SOB.      Called from primary team that daughter is at patient's bedside and would like to discuss potential options for drainage of pleural effusions as her father is intermittently confused and did not remember the conversation he had overnight with Thoracic provider. Patient seen at bedside with daughter present. Explained that on CT scan of the chest there is a moderate amount of fluid on the right and a small effusion on the left. Bedside POCUS of b/l effusions performed. Small effusion on left, not amendable to drainage and moderate effusion on right with good window. At this time patient and daughter understand that he was placed on IV diuretics to help with reduction in fluid likely 2/2 heart failure exacerbation but that the size of the right effusion might not significantly improve with diuresis alone. Patient currently on RA, no difficulty breathing, or difficulty breathing with ambulation. Says he is feeling slightly improved since presenting to hospital. At this time patient and daughter would like to monitor for a day or two and see if diuretics will reduce the size of the effusion to avoid needing a thora or PTC, but understand that if it does not work, intervention may be necessary. Cardiology following for assistance with medication management.       Thoracic surgery to sign off. Please repeat CXR to monitor for progress and re-consult if patient's clinical status worsens or if effusions do not improve and family desires further intervention.

## 2025-01-05 NOTE — CONSULT NOTE ADULT - SUBJECTIVE AND OBJECTIVE BOX
JERMAN MCKEON  [id number]        HPI:  83 y/o male PMHsAS s/p TAVR, CHFpEF, DM, HTN, HLD, LBBB sent to the ED from urgent care for evaluation.  As per patient and daughter at bed side, patient has been coughing for approximately a week. Noted cough sometimes productive of yellow sputum. He was seen at urgent care at the onset, was given cough suppressant but with no relief. Cough keeps him up at night. He retuned to urgent care, there was a concern for fluid in his lung so sent to the ED for eval. Patient reports no shortness of breath at all but complains primarily of congestion in his head and a lot of phlegm.  Patient denies chest pain, palpitations, orthopnea, presyncope, syncope.        ALLERGIES:  No Known Allergies      PAST MEDICAL & SURGICAL HISTORY:  Gout  Anxiety  Otherwise, as noted above      MEDICATIONS (HOME):  · 	aspirin 81 mg oral tablet, chewable: 1 tab(s) orally once a day  · 	allopurinol 100 mg oral tablet: orally once a day  · 	Norvasc 5 mg oral tablet: 1 tab(s) orally once a day  · 	lisinopril 20 mg oral tablet: 1 tab(s) orally once a day  · 	Zocor 40 mg oral tablet: Last Dose Taken:  , 0.5 tab(s) orally once a day  · 	metFORMIN 1000 mg oral tablet: 1 tab(s) orally 2 times a day  · 	glimepiride 1 mg oral tablet: 1 tab(s) orally once a day  · 	Januvia 100 mg oral tablet: 1 tab(s) orally once a day        SOCIAL HISTORY:  Patient denies alcohol, tobacco, drug use    FAMILY HISTORY:  FH: heart disease (Father)        ROS:  Patient denies cough, and other than noted above full ROS is unremarkable      PHYSICAL EXAM:  Vital Signs Last 24 Hrs  T(C): 36.8 (05 Jan 2025 11:27), Max: 37.2 (05 Jan 2025 05:12)  T(F): 98.2 (05 Jan 2025 11:27), Max: 98.9 (05 Jan 2025 05:12)  HR: 104 (05 Jan 2025 11:27) (98 - 115)  BP: 117/69 (05 Jan 2025 11:27) (117/69 - 160/94)  BP(mean): 88 (05 Jan 2025 08:37) (88 - 110)  RR: 18 (05 Jan 2025 11:27) (16 - 20)  SpO2: 90% (05 Jan 2025 11:27) (90% - 96%)  General: Patient comfortable in NAD  HEENT: NCAT, mmm, EOMI  Neck: no JVD, no carotid bruits  CVS: nl s1, split s2, no s3, +s4, no murmur or rubs, +ANUJ, tachy  Chest: LS diminished at based b/l  Abdomen: soft, nt/nd  Extremities: 1+ b/l pedal edema  Neuro: A&O x3  Psych: Normal affect      ECG:  SR with LBBB.      LABS:                        12.0   10.69 )-----------( 207      ( 05 Jan 2025 03:45 )             36.2     01-05    138  |  102  |  14.8  ----------------------------<  189[H]  4.0   |  23.0  |  0.95    Ca    8.6      05 Jan 2025 03:45    TPro  6.8  /  Alb  4.0  /  TBili  0.4  /  DBili  x   /  AST  30  /  ALT  18  /  AlkPhos  86  01-04        LHC (4/2024):  Normal coronary arteries.    Echo (91271):   1. Technically difficult image quality.   2. Left ventricular systolic function is low normal with an ejection fraction of 50 % by Stack's method of disks with an ejection fraction visually estimated at 50 to 55 %.   3. There is no evidence of a left ventricular thrombus.   4. The left ventricular diastolic function is indeterminate.   5. Estimated pulmonary artery systolic pressure is 49 mmHg, consistent with mild to moderate pulmonary hypertension.   6. There is mild calcification of the mitral valve annulus.   7. Normal left and right atrial size.   8. A 27mm Abbott Navitor Vision TAVR is visualized in the aortic position. Mild paravalvular regurgitation visualized. Mean gradient of 6 mmHg. AT= 88.09msec. Dimensionless index= .81.   9. No pericardial effusion seen.  10. Compared to the transthoracic echocardiogram performed on 4/14/2024, interval TAVR done for low gradient severe AS.    RADIOLOGY:  CTC:|  Right middle lobe consolidation. Additional bilateral lung nodular groundglass opacities. Interlobular septal thickening. Differential   includes infection and pulmonary edema. Malignancy cannot be entirely excluded and follow-up is recommended in 8-12 weeks to assess resolution.  Bilateral moderate pleural effusions,right greater left with adjacent atelectasis        Assessment:  83 y/o male PMHsAS s/p TAVR, DM, HTN, HLD, LBBB sent to the ED from urgent care for evaluation of ?CHF.  Patient with symptoms of cough and congestion for a week that were not getting better.  Evaluation in the ER includes a CAT scan showing right middle lobe pneumonia with associated pleural effusions and a degree of CHF.  Patient is not short of breath at all or hypoxic.  Will treat with antibiotics and some diuresis.  Will get echocardiogram to evaluate given recent TAVR and CHF.    Plan:  1.  Admit to telemetry for CHF exacerbation and pneumonia.  2.  Agree with Lasix IV daily.  CXR tomorrow or Tuesday to look for improvement in effusions.  3.  Continue other current cardiac meds in doses as noted above as prior to admission.  4.  Treatment of pneumonia per primary team.  5.  Check echocardiogram given CHF and recent TAVR.  6.  No ischemic concerns given recent normal catheterization.      Will follow.  Thanks!          
Surgeon: Naila  Consult requesting by: medicine    HISTORY OF PRESENT ILLNESS:  84M, pmhx HTN, HLD, gout, diabetes, anxiety, AS s/p TAVR 6/2024, chronic diastolic HF, with week long h/o productive cough, subjective fevers, chills, seen in Urgent care and referred to ED to be evaluated for possible pneumonia and pleural effusions, in ED CT chest with mod R and small L effusion, thoracic surgery consulted for possible drainage. pt states he has productive cough with yellow phelgm, no SOB. pt denies CP, palpitations, SOB, cough, fever, chills, itchiness/rash, diaphoresis, vision changes, HA, dizziness/lightheadedness, numbness/tingling, abd pain, N/V.    PAST MEDICAL & SURGICAL HISTORY:  HTN (hypertension)  HLD (hyperlipidemia)  Gout  Diabetes  COVID-19 vaccine series completed  Anxiety  Nonrheumatic aortic valve stenosis  H/O congestive heart failure    No significant past surgical history    MEDICATIONS  (STANDING):  allopurinol 100 milliGRAM(s) Oral daily  amLODIPine   Tablet 5 milliGRAM(s) Oral daily  aspirin  chewable 81 milliGRAM(s) Oral daily  atorvastatin 10 milliGRAM(s) Oral at bedtime  azithromycin  IVPB 500 milliGRAM(s) IV Intermittent every 24 hours  cefTRIAXone Injectable. 1000 milliGRAM(s) IV Push every 24 hours  dextrose 5%. 1000 milliLiter(s) (100 mL/Hr) IV Continuous <Continuous>  dextrose 5%. 1000 milliLiter(s) (50 mL/Hr) IV Continuous <Continuous>  dextrose 50% Injectable 25 Gram(s) IV Push once  dextrose 50% Injectable 12.5 Gram(s) IV Push once  dextrose 50% Injectable 25 Gram(s) IV Push once  enoxaparin Injectable 40 milliGRAM(s) SubCutaneous every 24 hours  furosemide   Injectable 40 milliGRAM(s) IV Push daily  glucagon  Injectable 1 milliGRAM(s) IntraMuscular once  guaiFENesin  milliGRAM(s) Oral every 12 hours  insulin lispro (ADMELOG) corrective regimen sliding scale   SubCutaneous three times a day before meals  insulin lispro (ADMELOG) corrective regimen sliding scale   SubCutaneous at bedtime  lisinopril 20 milliGRAM(s) Oral daily    MEDICATIONS  (PRN):  acetaminophen     Tablet .. 650 milliGRAM(s) Oral every 6 hours PRN Temp greater or equal to 38C (100.4F), Mild Pain (1 - 3)  aluminum hydroxide/magnesium hydroxide/simethicone Suspension 30 milliLiter(s) Oral every 4 hours PRN Dyspepsia  dextrose Oral Gel 15 Gram(s) Oral once PRN Blood Glucose LESS THAN 70 milliGRAM(s)/deciliter  melatonin 3 milliGRAM(s) Oral at bedtime PRN Insomnia  ondansetron Injectable 4 milliGRAM(s) IV Push every 8 hours PRN Nausea and/or Vomiting    Allergies: No Known Allergies    SOCIAL HISTORY:  lives with wife, daughter, son in law and grandchildren  no assist devices  independent    FAMILY HISTORY:  FH: heart disease (Father)    Review of Systems: negative x 10 systems except as noted above    PHYSICAL EXAM  Vital Signs Last 24 Hrs  T(C): 37.2 (05 Jan 2025 05:12), Max: 37.2 (05 Jan 2025 05:12)  T(F): 98.9 (05 Jan 2025 05:12), Max: 98.9 (05 Jan 2025 05:12)  HR: 112 (05 Jan 2025 05:12) (101 - 115)  BP: 135/73 (05 Jan 2025 05:12) (135/73 - 160/94)  BP(mean): 110 (04 Jan 2025 23:51) (110 - 110)  RR: 18 (05 Jan 2025 05:12) (16 - 20)  SpO2: 90% (05 Jan 2025 05:12) (90% - 96%)    Parameters below as of 05 Jan 2025 05:12  Patient On (Oxygen Delivery Method): room air                                                            LABS:                        12.0   10.69 )-----------( 207      ( 05 Jan 2025 03:45 )             36.2     01-05    138  |  102  |  14.8  ----------------------------<  189[H]  4.0   |  23.0  |  0.95    Ca    8.6      05 Jan 2025 03:45    TPro  6.8  /  Alb  4.0  /  TBili  0.4  /  DBili  x   /  AST  30  /  ALT  18  /  AlkPhos  86  01-04    Urinalysis Basic - ( 05 Jan 2025 03:45 )    Color: x / Appearance: x / SG: x / pH: x  Gluc: 189 mg/dL / Ketone: x  / Bili: x / Urobili: x   Blood: x / Protein: x / Nitrite: x   Leuk Esterase: x / RBC: x / WBC x   Sq Epi: x / Non Sq Epi: x / Bacteria: x      Gen: NAD  Neuro: A&Ox3 non focal speech clear and intact  Pulm: decreased BS b/l no wheezing  CV: S1S2 RRR  Abd: soft NT ND  ext: mild b/l LE edema, no cyanosis RAO LARRYP

## 2025-01-05 NOTE — CONSULT NOTE ADULT - PROBLEM SELECTOR RECOMMENDATION 9
mod R, small L on CT chest  spo2 stable on RA  pt refusing intervention  trial lasix  can re-evaluate for thora if needed for diagnostic sample  thoracic surgery to follow  plan to be d/w Dr. holder in Am rounds

## 2025-01-05 NOTE — PROGRESS NOTE ADULT - ATTENDING COMMENTS
PNA  RML Consolidation  Bilateral Pleural Effusions  Lower Extremity Edema     Suspect PNA with Acute CHF  TTE  Lasix   Abx  Infectious work up ordered  Mucinex   Saline nebs

## 2025-01-05 NOTE — CONSULT NOTE ADULT - ASSESSMENT
84M, pmhx HTN, HLD, gout, diabetes, anxiety, AS s/p TAVR 6/2024, chronic diastolic HF, with week long h/o productive cough, subjective fevers, chills, seen in Urgent care and referred to ED to be evaluated for possible pneumonia and pleural effusions, in ED CT chest 84M, pmhx HTN, HLD, gout, diabetes, anxiety, AS s/p TAVR 6/2024, chronic diastolic HF, with week long h/o productive cough, subjective fevers, chills, seen in Urgent care and referred to ED to be evaluated for possible pneumonia and pleural effusions, in ED CT chest with mod R and small L effusion, thoracic surgery consulted for possible drainage.with mod R and small L effusion, thoracic surgery consulted for possible drainage.

## 2025-01-06 ENCOUNTER — RESULT REVIEW (OUTPATIENT)
Age: 85
End: 2025-01-06

## 2025-01-06 LAB
GLUCOSE BLDC GLUCOMTR-MCNC: 220 MG/DL — HIGH (ref 70–99)
GLUCOSE BLDC GLUCOMTR-MCNC: 234 MG/DL — HIGH (ref 70–99)
GLUCOSE BLDC GLUCOMTR-MCNC: 344 MG/DL — HIGH (ref 70–99)
GLUCOSE BLDC GLUCOMTR-MCNC: 86 MG/DL — SIGNIFICANT CHANGE UP (ref 70–99)
LEGIONELLA AG UR QL: NEGATIVE — SIGNIFICANT CHANGE UP

## 2025-01-06 PROCEDURE — 99233 SBSQ HOSP IP/OBS HIGH 50: CPT

## 2025-01-06 PROCEDURE — 71045 X-RAY EXAM CHEST 1 VIEW: CPT | Mod: 26

## 2025-01-06 RX ORDER — SPIRONOLACTONE 50 MG/1
25 TABLET ORAL DAILY
Refills: 0 | Status: DISCONTINUED | OUTPATIENT
Start: 2025-01-07 | End: 2025-01-09

## 2025-01-06 RX ADMIN — LISINOPRIL 20 MILLIGRAM(S): 30 TABLET ORAL at 05:55

## 2025-01-06 RX ADMIN — CEFTRIAXONE SODIUM 1000 MILLIGRAM(S): 1 INJECTION, POWDER, FOR SOLUTION INTRAMUSCULAR; INTRAVENOUS at 22:25

## 2025-01-06 RX ADMIN — Medication 8: at 17:29

## 2025-01-06 RX ADMIN — ATORVASTATIN CALCIUM 10 MILLIGRAM(S): 40 TABLET, FILM COATED ORAL at 22:25

## 2025-01-06 RX ADMIN — Medication 40 MILLIGRAM(S): at 05:55

## 2025-01-06 RX ADMIN — ENOXAPARIN SODIUM 40 MILLIGRAM(S): 60 INJECTION INTRAVENOUS; SUBCUTANEOUS at 17:29

## 2025-01-06 RX ADMIN — ALLOPURINOL 100 MILLIGRAM(S): 100 TABLET ORAL at 12:53

## 2025-01-06 RX ADMIN — Medication 81 MILLIGRAM(S): at 12:53

## 2025-01-06 RX ADMIN — Medication 600 MILLIGRAM(S): at 05:55

## 2025-01-06 RX ADMIN — AZITHROMYCIN MONOHYDRATE 255 MILLIGRAM(S): 200 POWDER, FOR SUSPENSION ORAL at 23:32

## 2025-01-06 RX ADMIN — Medication 600 MILLIGRAM(S): at 17:29

## 2025-01-06 RX ADMIN — SODIUM CHLORIDE 3 MILLILITER(S): 9 INJECTION, SOLUTION INTRAMUSCULAR; INTRAVENOUS; SUBCUTANEOUS at 14:52

## 2025-01-06 RX ADMIN — Medication 5 MILLIGRAM(S): at 05:55

## 2025-01-06 RX ADMIN — SODIUM CHLORIDE 3 MILLILITER(S): 9 INJECTION, SOLUTION INTRAMUSCULAR; INTRAVENOUS; SUBCUTANEOUS at 20:41

## 2025-01-07 LAB
ANION GAP SERPL CALC-SCNC: 11 MMOL/L — SIGNIFICANT CHANGE UP (ref 5–17)
ANISOCYTOSIS BLD QL: SLIGHT — SIGNIFICANT CHANGE UP
BASOPHILS # BLD AUTO: 0.2 K/UL — SIGNIFICANT CHANGE UP (ref 0–0.2)
BASOPHILS NFR BLD AUTO: 1.8 % — SIGNIFICANT CHANGE UP (ref 0–2)
BUN SERPL-MCNC: 19.6 MG/DL — SIGNIFICANT CHANGE UP (ref 8–20)
CALCIUM SERPL-MCNC: 8.4 MG/DL — SIGNIFICANT CHANGE UP (ref 8.4–10.5)
CHLORIDE SERPL-SCNC: 100 MMOL/L — SIGNIFICANT CHANGE UP (ref 96–108)
CO2 SERPL-SCNC: 25 MMOL/L — SIGNIFICANT CHANGE UP (ref 22–29)
CREAT SERPL-MCNC: 0.96 MG/DL — SIGNIFICANT CHANGE UP (ref 0.5–1.3)
CULTURE RESULTS: SIGNIFICANT CHANGE UP
EGFR: 77 ML/MIN/1.73M2 — SIGNIFICANT CHANGE UP
EOSINOPHIL # BLD AUTO: 0.1 K/UL — SIGNIFICANT CHANGE UP (ref 0–0.5)
EOSINOPHIL NFR BLD AUTO: 0.9 % — SIGNIFICANT CHANGE UP (ref 0–6)
GIANT PLATELETS BLD QL SMEAR: PRESENT — SIGNIFICANT CHANGE UP
GLUCOSE BLDC GLUCOMTR-MCNC: 132 MG/DL — HIGH (ref 70–99)
GLUCOSE BLDC GLUCOMTR-MCNC: 292 MG/DL — HIGH (ref 70–99)
GLUCOSE BLDC GLUCOMTR-MCNC: 297 MG/DL — HIGH (ref 70–99)
GLUCOSE BLDC GLUCOMTR-MCNC: 333 MG/DL — HIGH (ref 70–99)
GLUCOSE SERPL-MCNC: 239 MG/DL — HIGH (ref 70–99)
HCT VFR BLD CALC: 33.9 % — LOW (ref 39–50)
HGB BLD-MCNC: 11.2 G/DL — LOW (ref 13–17)
LYMPHOCYTES # BLD AUTO: 1.27 K/UL — SIGNIFICANT CHANGE UP (ref 1–3.3)
LYMPHOCYTES # BLD AUTO: 11.5 % — LOW (ref 13–44)
MACROCYTES BLD QL: SLIGHT — SIGNIFICANT CHANGE UP
MAGNESIUM SERPL-MCNC: 1.7 MG/DL — SIGNIFICANT CHANGE UP (ref 1.6–2.6)
MANUAL SMEAR VERIFICATION: SIGNIFICANT CHANGE UP
MCHC RBC-ENTMCNC: 32.1 PG — SIGNIFICANT CHANGE UP (ref 27–34)
MCHC RBC-ENTMCNC: 33 G/DL — SIGNIFICANT CHANGE UP (ref 32–36)
MCV RBC AUTO: 97.1 FL — SIGNIFICANT CHANGE UP (ref 80–100)
MONOCYTES # BLD AUTO: 1.17 K/UL — HIGH (ref 0–0.9)
MONOCYTES NFR BLD AUTO: 10.6 % — SIGNIFICANT CHANGE UP (ref 2–14)
NEUTROPHILS # BLD AUTO: 8.1 K/UL — HIGH (ref 1.8–7.4)
NEUTROPHILS NFR BLD AUTO: 73.4 % — SIGNIFICANT CHANGE UP (ref 43–77)
OVALOCYTES BLD QL SMEAR: SLIGHT — SIGNIFICANT CHANGE UP
PHOSPHATE SERPL-MCNC: 3.2 MG/DL — SIGNIFICANT CHANGE UP (ref 2.4–4.7)
PLAT MORPH BLD: NORMAL — SIGNIFICANT CHANGE UP
PLATELET # BLD AUTO: SIGNIFICANT CHANGE UP K/UL (ref 150–400)
POIKILOCYTOSIS BLD QL AUTO: SLIGHT — SIGNIFICANT CHANGE UP
POLYCHROMASIA BLD QL SMEAR: SLIGHT — SIGNIFICANT CHANGE UP
POTASSIUM SERPL-MCNC: 4.1 MMOL/L — SIGNIFICANT CHANGE UP (ref 3.5–5.3)
POTASSIUM SERPL-SCNC: 4.1 MMOL/L — SIGNIFICANT CHANGE UP (ref 3.5–5.3)
PROMYELOCYTES # FLD: 0.9 % — HIGH (ref 0–0)
RBC # BLD: 3.49 M/UL — LOW (ref 4.2–5.8)
RBC # FLD: 13.6 % — SIGNIFICANT CHANGE UP (ref 10.3–14.5)
RBC BLD AUTO: SIGNIFICANT CHANGE UP
SODIUM SERPL-SCNC: 136 MMOL/L — SIGNIFICANT CHANGE UP (ref 135–145)
SPECIMEN SOURCE: SIGNIFICANT CHANGE UP
VARIANT LYMPHS # BLD: 0.9 % — SIGNIFICANT CHANGE UP (ref 0–6)
WBC # BLD: 11.03 K/UL — HIGH (ref 3.8–10.5)
WBC # FLD AUTO: 11.03 K/UL — HIGH (ref 3.8–10.5)

## 2025-01-07 PROCEDURE — 99233 SBSQ HOSP IP/OBS HIGH 50: CPT

## 2025-01-07 PROCEDURE — 99232 SBSQ HOSP IP/OBS MODERATE 35: CPT

## 2025-01-07 RX ADMIN — Medication 2: at 22:29

## 2025-01-07 RX ADMIN — Medication 81 MILLIGRAM(S): at 12:23

## 2025-01-07 RX ADMIN — ACETAMINOPHEN 650 MILLIGRAM(S): 80 SOLUTION/ DROPS ORAL at 16:43

## 2025-01-07 RX ADMIN — ENOXAPARIN SODIUM 40 MILLIGRAM(S): 60 INJECTION INTRAVENOUS; SUBCUTANEOUS at 16:42

## 2025-01-07 RX ADMIN — Medication 6: at 16:42

## 2025-01-07 RX ADMIN — Medication 40 MILLIGRAM(S): at 05:30

## 2025-01-07 RX ADMIN — ALLOPURINOL 100 MILLIGRAM(S): 100 TABLET ORAL at 12:23

## 2025-01-07 RX ADMIN — Medication 600 MILLIGRAM(S): at 16:43

## 2025-01-07 RX ADMIN — SODIUM CHLORIDE 3 MILLILITER(S): 9 INJECTION, SOLUTION INTRAMUSCULAR; INTRAVENOUS; SUBCUTANEOUS at 21:51

## 2025-01-07 RX ADMIN — AZITHROMYCIN MONOHYDRATE 255 MILLIGRAM(S): 200 POWDER, FOR SUSPENSION ORAL at 22:30

## 2025-01-07 RX ADMIN — CEFTRIAXONE SODIUM 1000 MILLIGRAM(S): 1 INJECTION, POWDER, FOR SOLUTION INTRAMUSCULAR; INTRAVENOUS at 22:29

## 2025-01-07 RX ADMIN — Medication 600 MILLIGRAM(S): at 05:30

## 2025-01-07 RX ADMIN — Medication 8: at 09:24

## 2025-01-07 RX ADMIN — LISINOPRIL 20 MILLIGRAM(S): 30 TABLET ORAL at 05:30

## 2025-01-07 RX ADMIN — ATORVASTATIN CALCIUM 10 MILLIGRAM(S): 40 TABLET, FILM COATED ORAL at 22:29

## 2025-01-07 RX ADMIN — SPIRONOLACTONE 25 MILLIGRAM(S): 50 TABLET ORAL at 05:30

## 2025-01-08 ENCOUNTER — TRANSCRIPTION ENCOUNTER (OUTPATIENT)
Age: 85
End: 2025-01-08

## 2025-01-08 ENCOUNTER — RESULT REVIEW (OUTPATIENT)
Age: 85
End: 2025-01-08

## 2025-01-08 LAB
ANION GAP SERPL CALC-SCNC: 14 MMOL/L — SIGNIFICANT CHANGE UP (ref 5–17)
BASOPHILS # BLD AUTO: 0.05 K/UL — SIGNIFICANT CHANGE UP (ref 0–0.2)
BASOPHILS NFR BLD AUTO: 0.5 % — SIGNIFICANT CHANGE UP (ref 0–2)
BUN SERPL-MCNC: 23.8 MG/DL — HIGH (ref 8–20)
CALCIUM SERPL-MCNC: 8.6 MG/DL — SIGNIFICANT CHANGE UP (ref 8.4–10.5)
CHLORIDE SERPL-SCNC: 100 MMOL/L — SIGNIFICANT CHANGE UP (ref 96–108)
CO2 SERPL-SCNC: 23 MMOL/L — SIGNIFICANT CHANGE UP (ref 22–29)
CREAT SERPL-MCNC: 1.07 MG/DL — SIGNIFICANT CHANGE UP (ref 0.5–1.3)
EGFR: 68 ML/MIN/1.73M2 — SIGNIFICANT CHANGE UP
EOSINOPHIL # BLD AUTO: 0.13 K/UL — SIGNIFICANT CHANGE UP (ref 0–0.5)
EOSINOPHIL NFR BLD AUTO: 1.4 % — SIGNIFICANT CHANGE UP (ref 0–6)
GLUCOSE BLDC GLUCOMTR-MCNC: 154 MG/DL — HIGH (ref 70–99)
GLUCOSE BLDC GLUCOMTR-MCNC: 313 MG/DL — HIGH (ref 70–99)
GLUCOSE BLDC GLUCOMTR-MCNC: 357 MG/DL — HIGH (ref 70–99)
GLUCOSE BLDC GLUCOMTR-MCNC: 398 MG/DL — HIGH (ref 70–99)
GLUCOSE SERPL-MCNC: 232 MG/DL — HIGH (ref 70–99)
HCT VFR BLD CALC: 32.7 % — LOW (ref 39–50)
HGB BLD-MCNC: 11 G/DL — LOW (ref 13–17)
IMM GRANULOCYTES NFR BLD AUTO: 1.8 % — HIGH (ref 0–0.9)
LYMPHOCYTES # BLD AUTO: 1.45 K/UL — SIGNIFICANT CHANGE UP (ref 1–3.3)
LYMPHOCYTES # BLD AUTO: 15.5 % — SIGNIFICANT CHANGE UP (ref 13–44)
MCHC RBC-ENTMCNC: 33 PG — SIGNIFICANT CHANGE UP (ref 27–34)
MCHC RBC-ENTMCNC: 33.6 G/DL — SIGNIFICANT CHANGE UP (ref 32–36)
MCV RBC AUTO: 98.2 FL — SIGNIFICANT CHANGE UP (ref 80–100)
MONOCYTES # BLD AUTO: 1.11 K/UL — HIGH (ref 0–0.9)
MONOCYTES NFR BLD AUTO: 11.8 % — SIGNIFICANT CHANGE UP (ref 2–14)
NEUTROPHILS # BLD AUTO: 6.47 K/UL — SIGNIFICANT CHANGE UP (ref 1.8–7.4)
NEUTROPHILS NFR BLD AUTO: 69 % — SIGNIFICANT CHANGE UP (ref 43–77)
PLATELET # BLD AUTO: 216 K/UL — SIGNIFICANT CHANGE UP (ref 150–400)
POTASSIUM SERPL-MCNC: 4.3 MMOL/L — SIGNIFICANT CHANGE UP (ref 3.5–5.3)
POTASSIUM SERPL-SCNC: 4.3 MMOL/L — SIGNIFICANT CHANGE UP (ref 3.5–5.3)
RBC # BLD: 3.33 M/UL — LOW (ref 4.2–5.8)
RBC # FLD: 13.5 % — SIGNIFICANT CHANGE UP (ref 10.3–14.5)
SODIUM SERPL-SCNC: 137 MMOL/L — SIGNIFICANT CHANGE UP (ref 135–145)
WBC # BLD: 9.38 K/UL — SIGNIFICANT CHANGE UP (ref 3.8–10.5)
WBC # FLD AUTO: 9.38 K/UL — SIGNIFICANT CHANGE UP (ref 3.8–10.5)

## 2025-01-08 PROCEDURE — 99232 SBSQ HOSP IP/OBS MODERATE 35: CPT

## 2025-01-08 PROCEDURE — 78452 HT MUSCLE IMAGE SPECT MULT: CPT | Mod: 26

## 2025-01-08 PROCEDURE — 93018 CV STRESS TEST I&R ONLY: CPT

## 2025-01-08 PROCEDURE — 99232 SBSQ HOSP IP/OBS MODERATE 35: CPT | Mod: 25

## 2025-01-08 PROCEDURE — 93016 CV STRESS TEST SUPVJ ONLY: CPT

## 2025-01-08 RX ADMIN — SODIUM CHLORIDE 3 MILLILITER(S): 9 INJECTION, SOLUTION INTRAMUSCULAR; INTRAVENOUS; SUBCUTANEOUS at 14:57

## 2025-01-08 RX ADMIN — SODIUM CHLORIDE 3 MILLILITER(S): 9 INJECTION, SOLUTION INTRAMUSCULAR; INTRAVENOUS; SUBCUTANEOUS at 09:19

## 2025-01-08 RX ADMIN — Medication 10: at 09:17

## 2025-01-08 RX ADMIN — AZITHROMYCIN MONOHYDRATE 255 MILLIGRAM(S): 200 POWDER, FOR SUSPENSION ORAL at 21:39

## 2025-01-08 RX ADMIN — ENOXAPARIN SODIUM 40 MILLIGRAM(S): 60 INJECTION INTRAVENOUS; SUBCUTANEOUS at 17:09

## 2025-01-08 RX ADMIN — Medication 600 MILLIGRAM(S): at 17:10

## 2025-01-08 RX ADMIN — CEFTRIAXONE SODIUM 1000 MILLIGRAM(S): 1 INJECTION, POWDER, FOR SOLUTION INTRAMUSCULAR; INTRAVENOUS at 21:39

## 2025-01-08 RX ADMIN — ACETAMINOPHEN 650 MILLIGRAM(S): 80 SOLUTION/ DROPS ORAL at 12:00

## 2025-01-08 RX ADMIN — Medication 81 MILLIGRAM(S): at 11:14

## 2025-01-08 RX ADMIN — LISINOPRIL 20 MILLIGRAM(S): 30 TABLET ORAL at 09:29

## 2025-01-08 RX ADMIN — Medication 4: at 22:11

## 2025-01-08 RX ADMIN — Medication 2: at 11:14

## 2025-01-08 RX ADMIN — Medication 10: at 17:11

## 2025-01-08 RX ADMIN — ALLOPURINOL 100 MILLIGRAM(S): 100 TABLET ORAL at 11:15

## 2025-01-08 RX ADMIN — SPIRONOLACTONE 25 MILLIGRAM(S): 50 TABLET ORAL at 09:30

## 2025-01-08 RX ADMIN — ACETAMINOPHEN 650 MILLIGRAM(S): 80 SOLUTION/ DROPS ORAL at 11:14

## 2025-01-08 RX ADMIN — ATORVASTATIN CALCIUM 10 MILLIGRAM(S): 40 TABLET, FILM COATED ORAL at 21:39

## 2025-01-08 RX ADMIN — Medication 600 MILLIGRAM(S): at 05:22

## 2025-01-08 RX ADMIN — Medication 40 MILLIGRAM(S): at 05:22

## 2025-01-08 NOTE — DISCHARGE NOTE PROVIDER - CARE PROVIDER_API CALL
Viral Rodriguez  Cardiology  [address] [place] [address]  Phone: [phone]  Fax: [phone]  Follow Up Time: 1 week

## 2025-01-08 NOTE — DISCHARGE NOTE PROVIDER - NSDCFUSCHEDAPPT_GEN_ALL_CORE_FT
Wang Coe Physician [facility]  NEUROLOGY [address]  Scheduled Appointment: 03/03/2025     [facility] Phys[facility]  [facility] [address]  Scheduled Appointment: 01/16/2025    Wang Coe Physician [facility]  NEUROLOGY [address]  Scheduled Appointment: 03/03/2025

## 2025-01-08 NOTE — DISCHARGE NOTE PROVIDER - ATTENDING DISCHARGE PHYSICAL EXAMINATION:
Vital Signs Last 24 Hrs  T(F): 99 (09 Jan 2025 05:15), Max: 99.2 (09 Jan 2025 00:09)  HR: 93 (09 Jan 2025 07:44) (90 - 103)  BP: 128/74 (09 Jan 2025 07:44) (106/60 - 128/74)  RR: 18 (09 Jan 2025 07:44) (18 - 18)  SpO2: 94% (09 Jan 2025 07:44) (92% - 98%)    Physical Exam:  Constitutional: alert and oriented, in no acute distress   Neck: Soft and supple  Respiratory: Clear to auscultation bilaterally, no wheezes or crackles  Cardiovascular: Regular rate and rhythm, no murmurs, gallops, rubs  Gastrointestinal: Soft, non-tender to palpation, +bs  Vascular: 2+ peripheral pulses  Neurological: A/O x 3, no focal neurological deficits  Musculoskeletal: 5/5 strength b/l upper and lower extremities, no lower extremity edema bilaterally

## 2025-01-08 NOTE — DISCHARGE NOTE PROVIDER - HOSPITAL COURSE
83 y/o M with PMH of aortic stenosis s/p TAVR, DM, HTN, HLD presented for shortness of breath admitted for pneumonia and congestive heart failure. CT chest was performed with right middle lobe consolidation. The patient was treated with Azithromycin and Ceftriaxone. Thoracic surgery was consulted for moderate bilateral pleural effusion and did not recommend drainage. Cardiology was consulted for heart failure and was treated with IV diuresis. Nuclear stress test was performed, ***   83 y/o M with PMH of aortic stenosis s/p TAVR, DM, HTN, HLD presented for shortness of breath admitted for pneumonia and congestive heart failure. CT chest was performed with right middle lobe consolidation. The patient was treated with Azithromycin and Ceftriaxone. Thoracic surgery was consulted for moderate bilateral pleural effusion and did not recommend drainage. Cardiology was consulted for heart failure and was treated with IV diuresis. Nuclear stress test was performed which was negative for ischemia. The patient is hemodynamically stable for discharge with appropriate follow up.

## 2025-01-08 NOTE — PROGRESS NOTE ADULT - ASSESSMENT
84y Male with PMHx of aortic stenosis s/p TAVR, DM-2, HTN, HLD was sent to the ED from urgent care for evaluation; reported 1.5weeks of cough sometimes productive of yellow sputum. In the ED, CT chest:  Right middle lobe consolidation. Additional bilateral lung nodular groundglass opacities. Interlobular septal thickening. Differential includes infection and pulmonary edema. Malignancy cannot be entirely excluded. Bilateral moderate pleural effusions, R>L with adjacent atelectasis    RML consolidation likely 2/2 Superimposed Pneumonia  Telemetry monitoring  CT chest as noted above   Continue Azithromycin  Continue Ceftriaxone  Mucinex BID/Benzonatate added for cough   Saturating well on room air  Incentive spirometry   Possible malignancy can not be ruled out, this was discussed with patient and daughter at bed side, advised to follow up with PCP for repeat CT chest in 8-12 weeks and subsequently pulmonology.     Congestive Heart Failure  BNP: 3699  Saturating well on room air  Cardiology consult appreciated  Continue IV Lasix 40mg daily  TTE with EF 25%  Nuclear stress test performed, pending official read    B/L pleural effusion-moderate   As noted on CT chest as above   As per ED, CT surgery consulted   Thoracic surgery recs appreciated    Hyperglycemia with underlying DM-2   Serum glucose controlled on ISS currently  Hold PO medications:   Metformin 1000mg bid   Januvia 100mg   Glimepiride 1mg   FS with ISS    HTN/HLD   Lisinopril 20mg   Amlodipine 5mg   Simvastatin 20mg   Aspirin 81mg   Monitor BP    Gout   Allopurinol 100mg     DVT ppx  Lovenox SQ    Dispo: DC pending stress test results
84y Male with PMHx of aortic stenosis s/p TAVR, DM-2, HTN, HLD was sent to the ED from urgent care for evaluation; reported 1.5weeks of cough sometimes productive of yellow sputum. In the ED, CT chest:  Right middle lobe consolidation. Additional bilateral lung nodular groundglass opacities. Interlobular septal thickening. Differential includes infection and pulmonary edema. Malignancy cannot be entirely excluded. Bilateral moderate pleural effusions, R>L with adjacent atelectasis    RML consolidation likely 2/2 Superimposed Pneumonia  Telemetry monitoring  CT chest as noted above   Continue Azithromycin  Continue Ceftriaxone  Mucinex BID/Benzonatate added for cough   Saturating well on room air  Incentive spirometry   Possible malignancy can not be ruled out, this was discussed with patient and daughter at bed side, advised to follow up with PCP for repeat CT chest in 8-12 weeks and subsequently pulmonology.     Congestive Heart Failure  BNP: 3699  Saturating well on room air  Cardiology consult appreciated  Continue IV Lasix 40mg daily  TTE with EF 25%  Nuclear stress test tomorrow  NPO after midnight    B/L pleural effusion-moderate   As noted on CT chest as above   As per ED, CT surgery consulted   Thoracic surgery recs appreciated    Hyperglycemia with underlying DM-2   Serum glucose controlled on ISS currently  Hold PO medications:   Metformin 1000mg bid   Januvia 100mg   Glimepiride 1mg   FS with ISS    HTN/HLD   Lisinopril 20mg   Amlodipine 5mg   Simvastatin 20mg   Aspirin 81mg   Monitor BP    Gout   Allopurinol 100mg     DVT ppx  - Lovenox SQ    Dispo: Possible DC home tomorrow pending stress test results    Plan of care discussed with patient and daughter at bedside. 
ASSESSMENT:   JERMAN MCKEON is a 84y Male with PMHx of aortic stenosis s/p TAVR, DM-2, HTN, HLD was sent to the ED from urgent care for evaluation; reported 1.5weeks of cough sometimes productive of yellow sputum. In the ED, CT chest:  Right middle lobe consolidation. Additional bilateral lung nodular groundglass opacities. Interlobular septal thickening. Differential includes infection and pulmonary edema. Malignancy cannot be entirely excluded. Bilateral moderate pleural effusions, R>L with adjacent atelectasis      PLAN:  #RML consolidation likely 2/2 Superimposed Pneumonia  Admit to telemetry   Now with developing Leukocytosis  CT chest as noted above   Continue CTX/Azithro  Mucinex BID/Benzonatate added for cough   Oxygen therapy as needed   Incentive spirometry   Possible malignancy can not be ruled out, this was discussed with patient and daughter at bed side, advised to follow up with PCP for repeat CT chest in 8-12 weeks and subsequently pulmonology.     #B/L pleural effusion-moderate   As noted on CT chest as above   As per ED, CT surgery consulted   Initial refusal by patient, but patient forgetful, daughter at bedside, to see again  HOLD Lovenox for possible R.sided chest tube  Will continue Lasix, monitor renal function     #Possible pulmonary edema   BNP: 3699  As per ED, cardiology consulted   Rec, diuretic and TTE pending   Cardiology on board   Oxygen therapy as needed     Hyperglycemia with underlying DM-2   Serum glucose controlled on ISS currently  Hold PO medications:   Metformin 1000mg bid   Januvia 100mg   Glimepiride 1mg   Insulin sliding scale     HTN/HLD   Lisinopril 20mg   Amlodipine 5mg   Simvastatin 20mg   Aspirin 81mg   Monitor BP    Gout   Allopurinol 100mg     Supportive   DVT prophylaxis: Lovenox; hold for possible chest tube placement, nurse in CDU aware  Diet: CHO     Plan of care discussed with patient, daughter at bedside and ER nurse. 
84y Male with PMHx of aortic stenosis s/p TAVR, DM-2, HTN, HLD was sent to the ED from urgent care for evaluation; reported 1.5weeks of cough sometimes productive of yellow sputum. In the ED, CT chest:  Right middle lobe consolidation. Additional bilateral lung nodular groundglass opacities. Interlobular septal thickening. Differential includes infection and pulmonary edema. Malignancy cannot be entirely excluded. Bilateral moderate pleural effusions, R>L with adjacent atelectasis    RML consolidation likely 2/2 Superimposed Pneumonia  Telemetry monitoring  CT chest as noted above   Continue Azithromycin  Continue Ceftriaxone  Mucinex BID/Benzonatate added for cough   Saturating well on room air  Incentive spirometry   Possible malignancy can not be ruled out, this was discussed with patient and daughter at bed side, advised to follow up with PCP for repeat CT chest in 8-12 weeks and subsequently pulmonology.     B/L pleural effusion-moderate   As noted on CT chest as above   As per ED, CT surgery consulted   Thoracic surgery recs appreciated    Congestive Heart Failure  BNP: 3699  Saturating well on nasal cannula  Cardiology consult appreciated  Continue IV Lasix 40mg daily  TTE pending    Hyperglycemia with underlying DM-2   Serum glucose controlled on ISS currently  Hold PO medications:   Metformin 1000mg bid   Januvia 100mg   Glimepiride 1mg   FS with ISS    HTN/HLD   Lisinopril 20mg   Amlodipine 5mg   Simvastatin 20mg   Aspirin 81mg   Monitor BP    Gout   Allopurinol 100mg     DVT ppx  - Lovenox SQ    Plan of care discussed with patient and daughter at bedside.

## 2025-01-08 NOTE — DISCHARGE NOTE PROVIDER - NSDCCPCAREPLAN_GEN_ALL_CORE_FT
PRINCIPAL DISCHARGE DIAGNOSIS  Diagnosis: Acute CHF  Assessment and Plan of Treatment: - Nuclear stress test negative for ischemia  - Please continue all medications as prescribed  - Follow up with Cardiology in 1 week      SECONDARY DISCHARGE DIAGNOSES  Diagnosis: Pleural effusion  Assessment and Plan of Treatment: - Improved

## 2025-01-08 NOTE — DISCHARGE NOTE PROVIDER - NSDCMRMEDTOKEN_GEN_ALL_CORE_FT
allopurinol 100 mg oral tablet: orally once a day  aspirin 81 mg oral tablet, chewable: 1 tab(s) orally once a day  glimepiride 1 mg oral tablet: 1 tab(s) orally once a day  Januvia 100 mg oral tablet: 1 tab(s) orally once a day  lisinopril 20 mg oral tablet: 1 tab(s) orally once a day  metFORMIN 1000 mg oral tablet: 1 tab(s) orally 2 times a day  Norvasc 5 mg oral tablet: 1 tab(s) orally once a day  Zocor 40 mg oral tablet: 0.5 tab(s) orally once a day   allopurinol 100 mg oral tablet: orally once a day  aspirin 81 mg oral tablet, chewable: 1 tab(s) orally once a day  benzonatate 100 mg oral capsule: 1 cap(s) orally every 8 hours as needed for Cough  furosemide 40 mg oral tablet: 1 tab(s) orally once a day  glimepiride 1 mg oral tablet: 1 tab(s) orally once a day  Januvia 100 mg oral tablet: 1 tab(s) orally once a day  lisinopril 20 mg oral tablet: 1 tab(s) orally once a day  metFORMIN 1000 mg oral tablet: 1 tab(s) orally 2 times a day  spironolactone 25 mg oral tablet: 1 tab(s) orally once a day  Zocor 40 mg oral tablet: 0.5 tab(s) orally once a day

## 2025-01-08 NOTE — PROGRESS NOTE ADULT - SUBJECTIVE AND OBJECTIVE BOX
Chief complaint: SOB    Patient seen and examined at bedside. No acute overnight events reported. No fever, chills, cough, chest pain or shortness of breath.     Vital Signs Last 24 Hrs  T(F): 98.8 (06 Jan 2025 09:33), Max: 98.9 (06 Jan 2025 00:15)  HR: 107 (06 Jan 2025 09:33) (99 - 107)  BP: 130/78 (06 Jan 2025 09:33) (117/69 - 137/77)  RR: 18 (06 Jan 2025 09:33) (18 - 18)  SpO2: 95% (06 Jan 2025 09:33) (90% - 95%)    Physical Exam:  Constitutional: alert and oriented, in no acute distress   Neck: Soft and supple  Respiratory: Clear to auscultation bilaterally  Cardiovascular: Regular rate and rhyhtm  Gastrointestinal: Soft, non-tender to palpation, +bs  Vascular: 2+ peripheral pulses  Neurological: A/O x 3  Musculoskeletal: no lower extremity edema bilaterally    Labs:                        12.0   10.69 )-----------( 207      ( 05 Jan 2025 03:45 )             36.2   01-05    138  |  102  |  14.8  ----------------------------<  189[H]  4.0   |  23.0  |  0.95    Ca    8.6      05 Jan 2025 03:45    TPro  6.8  /  Alb  4.0  /  TBili  0.4  /  DBili  x   /  AST  30  /  ALT  18  /  AlkPhos  86  01-04  
Chief complaint: SOB    Patient seen and examined at bedside. No acute overnight events reported. Patient states he is feeling better, no fever, chills, cough, chest pain or shortness of breath.     Vital Signs Last 24 Hrs  T(F): 98.2 (07 Jan 2025 12:05), Max: 99.5 (07 Jan 2025 00:36)  HR: 94 (07 Jan 2025 12:05) (94 - 109)  BP: 113/70 (07 Jan 2025 12:05) (113/70 - 137/87)  RR: 18 (07 Jan 2025 12:05) (18 - 18)  SpO2: 92% (07 Jan 2025 12:05) (92% - 98%)    Physical Exam:  Constitutional: alert and oriented, in no acute distress   Neck: Soft and supple  Respiratory: Good air entry b/l  Cardiovascular: Regular rate and rhyhtm  Gastrointestinal: Soft, non-tender to palpation, +bs  Vascular: 2+ peripheral pulses  Neurological: A/O x 3, no focal neurological deficits  Musculoskeletal: 5/5 strength b/l upper and lower extremities, no lower extremity edema bilaterally    Labs:                        11.2   11.03 )-----------( CLUMPED    ( 07 Jan 2025 05:05 )             33.9   01-07    136  |  100  |  19.6  ----------------------------<  239[H]  4.1   |  25.0  |  0.96    Ca    8.4      07 Jan 2025 05:05  Phos  3.2     01-07  Mg     1.7     01-07    
JERMAN MCKEON  [id number]      Chief Complaint:  Follow up of CHF, pleural effusion, PNA    Interval History:  Echo with new drop on the LVEF    Tele:  SR      acetaminophen     Tablet .. 650 milliGRAM(s) Oral every 6 hours PRN  allopurinol 100 milliGRAM(s) Oral daily  aluminum hydroxide/magnesium hydroxide/simethicone Suspension 30 milliLiter(s) Oral every 4 hours PRN  amLODIPine   Tablet 5 milliGRAM(s) Oral daily  aspirin  chewable 81 milliGRAM(s) Oral daily  atorvastatin 10 milliGRAM(s) Oral at bedtime  azithromycin  IVPB 500 milliGRAM(s) IV Intermittent every 24 hours  benzonatate 100 milliGRAM(s) Oral every 8 hours PRN  cefTRIAXone Injectable. 1000 milliGRAM(s) IV Push every 24 hours  dextrose 5%. 1000 milliLiter(s) IV Continuous <Continuous>  dextrose 5%. 1000 milliLiter(s) IV Continuous <Continuous>  dextrose 50% Injectable 25 Gram(s) IV Push once  dextrose 50% Injectable 12.5 Gram(s) IV Push once  dextrose 50% Injectable 25 Gram(s) IV Push once  dextrose Oral Gel 15 Gram(s) Oral once PRN  enoxaparin Injectable 40 milliGRAM(s) SubCutaneous every 24 hours  furosemide   Injectable 40 milliGRAM(s) IV Push daily  glucagon  Injectable 1 milliGRAM(s) IntraMuscular once  guaiFENesin  milliGRAM(s) Oral every 12 hours  insulin lispro (ADMELOG) corrective regimen sliding scale   SubCutaneous three times a day before meals  insulin lispro (ADMELOG) corrective regimen sliding scale   SubCutaneous at bedtime  lisinopril 20 milliGRAM(s) Oral daily  melatonin 3 milliGRAM(s) Oral at bedtime PRN  ondansetron Injectable 4 milliGRAM(s) IV Push every 8 hours PRN  sodium chloride 0.9% for Nebulization 3 milliLiter(s) Nebulizer every 6 hours          Physical Exam:  T(C): 37.3 (01-06-25 @ 15:44), Max: 37.3 (01-06-25 @ 15:44)  HR: 109 (01-06-25 @ 15:44) (99 - 109)  BP: 123/66 (01-06-25 @ 15:44) (123/66 - 137/77)  RR: 18 (01-06-25 @ 15:44) (18 - 18)  SpO2: 96% (01-06-25 @ 15:44) (92% - 96%)  Wt(kg): --  General: Comfortable in NAD  Neck: No JVD  CVS: nl s1s2, no s3  Pulm: CTA b/l  Abd: soft, non-tender  Ext: 1+edema on both legs.  Neuro A&O x3  Psych: Normal affect      Labs:   05 Jan 2025 03:45    138    |  102    |  14.8   ----------------------------<  189    4.0     |  23.0   |  0.95     Ca    8.6        05 Jan 2025 03:45                            12.0   10.69 )-----------( 207      ( 05 Jan 2025 03:45 )             36.2             LHC (4/2024):  Normal coronary arteries.    Echo ([id number]):   1. Technically difficult image quality.   2. Left ventricular systolic function is low normal with an ejection fraction of 50 % by Tsack's method of disks with an ejection fraction visually estimated at 50 to 55 %.   3. There is no evidence of a left ventricular thrombus.   4. The left ventricular diastolic function is indeterminate.   5. Estimated pulmonary artery systolic pressure is 49 mmHg, consistent with mild to moderate pulmonary hypertension.   6. There is mild calcification of the mitral valve annulus.   7. Normal left and right atrial size.   8. A 27mm Abbott Navitor Vision TAVR is visualized in the aortic position. Mild paravalvular regurgitation visualized. Mean gradient of 6 mmHg. AT= 88.09msec. Dimensionless index= .81.   9. No pericardial effusion seen.  10. Compared to the transthoracic echocardiogram performed on 4/14/2024, interval TAVR done for low gradient severe AS.    RADIOLOGY:  CTC:|  Right middle lobe consolidation. Additional bilateral lung nodular groundglass opacities. Interlobular septal thickening. Differential   includes infection and pulmonary edema. Malignancy cannot be entirely excluded and follow-up is recommended in 8-12 weeks to assess resolution.  Bilateral moderate pleural effusions,right greater left with adjacent atelectasis      < from: TTE Limited W or WO Ultrasound Enhancing Agent (01.06.25 @ 11:34) >   1. Left ventricular systolic function is severely decreased with an ejection fraction of 25 % by Stack's method of disks. Global left ventricular hypokinesis.   2. There is severe (grade 3) left ventricular diastolic dysfunction, with elevated left ventricular filling pressure.   3. Normal right ventricular cavity size and normal right ventricular systolic function.   4. Left atrium is mildly dilated.   5. A Transcatheter deployed (TAVR) valve replacement is present in the aortic position The prosthetic valve is well seated with normal function. No prosthetic aortic stenosis. No intravalvular regurgitation.   6. Compared to the transthoracic echocardiogram performed on 6/7/2024, There is a new drop in the left ventricular systolic function.      Assessment:  83 y/o male PMHsAS s/p TAVR, DM, HTN, HLD, LBBB sent to the ED from urgent care for evaluation of ?CHF.  Patient with symptoms of cough and congestion for a week that were not getting better.  Evaluation in the ER includes a CAT scan showing right middle lobe pneumonia with associated pleural effusions and a degree of CHF.  Patient is not short of breath at all or hypoxic.  Will treat with antibiotics and some diuresis.  Will get echocardiogram to evaluate given recent TAVR and CHF.    Compared to the transthoracic echocardiogram performed on 6/7/2024, There is a new drop in the left ventricular systolic function.  normal coronaries in 4/2024, CMP likely NICM  slightly vol up on exam       Plan:  1.  continue with Lasix IV daily.  Monitor renal function and electrolytes  2.  Continue with ACEi, amlodipine stopped, aldactone ordered for tomorrow. [id number] later on this admission   3.  Low suspicion for obstructive CAD given recent LHC with normal coronaries, will perform nuclear stress test once euvolemic for completion of CAD workup in the setting of new onset of CMP  4.  Treatment of pneumonia per primary team.  5.  pleural effusion management as per primary team and other consultants.   6.  Will continue to follow.     D/w Dr Wright       
Patient is a 84y old  Male who presents with a chief complaint of     INTERVAL HPI/OVERNIGHT EVENTS:  - No acute overnight events    TODAY:  - Patient examined bedside, no complaints.   - Patient is forgetful, daughter at bedside, she states that he has had this memory issue for a while  - Patient denies CP, SOB, fever, nausea, vomiting    MEDICATIONS  (STANDING):  allopurinol 100 milliGRAM(s) Oral daily  amLODIPine   Tablet 5 milliGRAM(s) Oral daily  aspirin  chewable 81 milliGRAM(s) Oral daily  atorvastatin 10 milliGRAM(s) Oral at bedtime  azithromycin  IVPB 500 milliGRAM(s) IV Intermittent every 24 hours  cefTRIAXone Injectable. 1000 milliGRAM(s) IV Push every 24 hours  dextrose 5%. 1000 milliLiter(s) (100 mL/Hr) IV Continuous <Continuous>  dextrose 5%. 1000 milliLiter(s) (50 mL/Hr) IV Continuous <Continuous>  dextrose 50% Injectable 25 Gram(s) IV Push once  dextrose 50% Injectable 12.5 Gram(s) IV Push once  dextrose 50% Injectable 25 Gram(s) IV Push once  enoxaparin Injectable 40 milliGRAM(s) SubCutaneous every 24 hours  furosemide   Injectable 40 milliGRAM(s) IV Push daily  glucagon  Injectable 1 milliGRAM(s) IntraMuscular once  guaiFENesin  milliGRAM(s) Oral every 12 hours  insulin lispro (ADMELOG) corrective regimen sliding scale   SubCutaneous three times a day before meals  insulin lispro (ADMELOG) corrective regimen sliding scale   SubCutaneous at bedtime  lisinopril 20 milliGRAM(s) Oral daily    MEDICATIONS  (PRN):  acetaminophen     Tablet .. 650 milliGRAM(s) Oral every 6 hours PRN Temp greater or equal to 38C (100.4F), Mild Pain (1 - 3)  aluminum hydroxide/magnesium hydroxide/simethicone Suspension 30 milliLiter(s) Oral every 4 hours PRN Dyspepsia  dextrose Oral Gel 15 Gram(s) Oral once PRN Blood Glucose LESS THAN 70 milliGRAM(s)/deciliter  melatonin 3 milliGRAM(s) Oral at bedtime PRN Insomnia  ondansetron Injectable 4 milliGRAM(s) IV Push every 8 hours PRN Nausea and/or Vomiting      Allergies    No Known Allergies    Intolerances        REVIEW OF SYSTEMS:  as above      Vital Signs Last 24 Hrs  T(C): 36.9 (05 Jan 2025 08:37), Max: 37.2 (05 Jan 2025 05:12)  T(F): 98.4 (05 Jan 2025 08:37), Max: 98.9 (05 Jan 2025 05:12)  HR: 98 (05 Jan 2025 08:37) (98 - 115)  BP: 117/73 (05 Jan 2025 08:37) (117/73 - 160/94)  BP(mean): 88 (05 Jan 2025 08:37) (88 - 110)  RR: 18 (05 Jan 2025 08:37) (16 - 20)  SpO2: 93% (05 Jan 2025 08:37) (90% - 96%)    Parameters below as of 05 Jan 2025 08:37  Patient On (Oxygen Delivery Method): room air        PHYSICAL EXAM:  GENERAL: Not in acute distress, lying comfortably, elderly, hard of hearing, forgetful at times  HEAD:  Atraumatic, Normocephalic  EYES: EOMI, PERRLA, conjunctiva and sclera clear  NECK: Supple, No JVD, Normal thyroid  NERVOUS SYSTEM:  Alert & Oriented X4, No gross focal deficits  CHEST/LUNG: B/L diminished breath sounds with B/L Crackles R>L; on room air, not in respiratory distress  HEART: Regular rate and rhythm; No murmurs, rubs, or gallops  ABDOMEN: Soft, Nontender, Nondistended; Bowel sounds present  EXTREMITIES:  No clubbing, cyanosis, or edema  SKIN: No rashes or lesions    LABS:                        12.0   10.69 )-----------( 207      ( 05 Jan 2025 03:45 )             36.2     01-05    138  |  102  |  14.8  ----------------------------<  189[H]  4.0   |  23.0  |  0.95    Ca    8.6      05 Jan 2025 03:45    TPro  6.8  /  Alb  4.0  /  TBili  0.4  /  DBili  x   /  AST  30  /  ALT  18  /  AlkPhos  86  01-04      Urinalysis Basic - ( 05 Jan 2025 03:45 )    Color: x / Appearance: x / SG: x / pH: x  Gluc: 189 mg/dL / Ketone: x  / Bili: x / Urobili: x   Blood: x / Protein: x / Nitrite: x   Leuk Esterase: x / RBC: x / WBC x   Sq Epi: x / Non Sq Epi: x / Bacteria: x      CAPILLARY BLOOD GLUCOSE      POCT Blood Glucose.: 84 mg/dL (05 Jan 2025 08:44)  POCT Blood Glucose.: 240 mg/dL (05 Jan 2025 06:59)      RADIOLOGY & ADDITIONAL TESTS:    Imaging Personally Reviewed:  [X] YES  [ ] NO    Consultant(s) Notes Reviewed:  [X] YES  [ ] NO    Care Discussed with Consultants/Other Providers [X] YES  [ ] NO    Plan of Care discussed with House Staff: [X]YES [ ] NO
JERMAN MCKEON  [id number]      Chief Complaint:  Follow up of CHF, pleural effusion, PNA, new CMP    Interval History:  Patient still with cough.  No other c/o.  Denies CP, SOB, palps.    Tele:  SR, 1 6-beat NSVT        acetaminophen     Tablet .. 650 milliGRAM(s) Oral every 6 hours PRN  allopurinol 100 milliGRAM(s) Oral daily  aluminum hydroxide/magnesium hydroxide/simethicone Suspension 30 milliLiter(s) Oral every 4 hours PRN  aspirin  chewable 81 milliGRAM(s) Oral daily  atorvastatin 10 milliGRAM(s) Oral at bedtime  azithromycin  IVPB 500 milliGRAM(s) IV Intermittent every 24 hours  benzonatate 100 milliGRAM(s) Oral every 8 hours PRN  cefTRIAXone Injectable. 1000 milliGRAM(s) IV Push every 24 hours  dextrose 5%. 1000 milliLiter(s) IV Continuous <Continuous>  dextrose 5%. 1000 milliLiter(s) IV Continuous <Continuous>  dextrose 50% Injectable 25 Gram(s) IV Push once  dextrose 50% Injectable 12.5 Gram(s) IV Push once  dextrose 50% Injectable 25 Gram(s) IV Push once  dextrose Oral Gel 15 Gram(s) Oral once PRN  enoxaparin Injectable 40 milliGRAM(s) SubCutaneous every 24 hours  furosemide   Injectable 40 milliGRAM(s) IV Push daily  glucagon  Injectable 1 milliGRAM(s) IntraMuscular once  guaiFENesin  milliGRAM(s) Oral every 12 hours  insulin lispro (ADMELOG) corrective regimen sliding scale   SubCutaneous three times a day before meals  insulin lispro (ADMELOG) corrective regimen sliding scale   SubCutaneous at bedtime  lisinopril 20 milliGRAM(s) Oral daily  melatonin 3 milliGRAM(s) Oral at bedtime PRN  ondansetron Injectable 4 milliGRAM(s) IV Push every 8 hours PRN  sodium chloride 0.9% for Nebulization 3 milliLiter(s) Nebulizer every 6 hours  spironolactone 25 milliGRAM(s) Oral daily          Physical Exam:  T(C): 36.9 (01-08-25 @ 11:00), Max: 37.4 (01-08-25 @ 09:24)  HR: 90 (01-08-25 @ 11:00) (90 - 105)  BP: 117/69 (01-08-25 @ 11:00) (102/64 - 134/79)  RR: 18 (01-08-25 @ 11:00) (18 - 18)  SpO2: 95% (01-08-25 @ 11:00) (93% - 96%)  General: Comfortable in NAD  Neck: No JVD  CVS: nl s1s2, no s3  Pulm: CTA b/l, diminished b/l at lower bases  Abd: soft, non-tender  Ext: No c/c/e  Neuro A&O x3  Psych: Normal affect      Labs:   08 Jan 2025 04:37    137    |  100    |  23.8   ----------------------------<  232    4.3     |  23.0   |  1.07     Ca    8.6        08 Jan 2025 04:37  Phos  3.2       07 Jan 2025 05:05  Mg     1.7       07 Jan 2025 05:05                            11.0   9.38  )-----------( 216      ( 08 Jan 2025 04:37 )             32.7               LH (4/2024):  Normal coronary arteries.    Echo ([id number]):   1. Technically difficult image quality.   2. Left ventricular systolic function is low normal with an ejection fraction of 50 % by Stack's method of disks with an ejection fraction visually estimated at 50 to 55 %.   3. There is no evidence of a left ventricular thrombus.   4. The left ventricular diastolic function is indeterminate.   5. Estimated pulmonary artery systolic pressure is 49 mmHg, consistent with mild to moderate pulmonary hypertension.   6. There is mild calcification of the mitral valve annulus.   7. Normal left and right atrial size.   8. A 27mm Abbott Navitor Vision TAVR is visualized in the aortic position. Mild paravalvular regurgitation visualized. Mean gradient of 6 mmHg. AT= 88.09msec. Dimensionless index= .81.   9. No pericardial effusion seen.  10. Compared to the transthoracic echocardiogram performed on 4/14/2024, interval TAVR done for low gradient severe AS.    RADIOLOGY:  CTC:|  Right middle lobe consolidation. Additional bilateral lung nodular groundglass opacities. Interlobular septal thickening. Differential   includes infection and pulmonary edema. Malignancy cannot be entirely excluded and follow-up is recommended in 8-12 weeks to assess resolution.  Bilateral moderate pleural effusions,right greater left with adjacent atelectasis      < from: TTE Limited W or WO Ultrasound Enhancing Agent (01.06.25 @ 11:34) >   1. Left ventricular systolic function is severely decreased with an ejection fraction of 25 % by Stack's method of disks. Global left ventricular hypokinesis.   2. There is severe (grade 3) left ventricular diastolic dysfunction, with elevated left ventricular filling pressure.   3. Normal right ventricular cavity size and normal right ventricular systolic function.   4. Left atrium is mildly dilated.   5. A Transcatheter deployed (TAVR) valve replacement is present in the aortic position The prosthetic valve is well seated with normal function. No prosthetic aortic stenosis. No intravalvular regurgitation.   6. Compared to the transthoracic echocardiogram performed on 6/7/2024, There is a new drop in the left ventricular systolic function.      Assessment:  83 y/o male PMHsAS s/p TAVR, DM, HTN, HLD, LBBB sent to the ED from urgent care for evaluation of ?CHF.  Patient with symptoms of cough and congestion for a week that were not getting better.  Evaluation in the ER includes a CAT scan showing right middle lobe pneumonia with associated pleural effusions and a degree of CHF.  Patient is not short of breath at all or hypoxic.  Will treat with antibiotics and some diuresis.  Will get echocardiogram to evaluate given recent TAVR and CHF.  -Compared to the transthoracic echocardiogram performed on 6/7/2024, There is a new drop in the left ventricular systolic function.  -Normal coronaries in 4/2024, CMP likely NICM  -Slightly vol up on exam, now better.  -Stress test with no ischemia.  Mild LV dysfxn by nuke.    Plan:  1.  Check CXR to reassess effusions.  If improved significantly change Lasix to po.  2.  Continue with ACEi, amlodipine stopped, aldactone started.  [id number] as OP.   3.  No additional CV w/u now.  4.  Treatment of pneumonia per primary team.  5.  If CXR improved CV stable for d/c with close OP f/u.    D/w Dr. Wright.      
JERMAN MCKEON  [id number]      Chief Complaint:  Follow up of CHF, pleural effusion, PNA, new CMP    Interval History:  No events overnight   Tele:  SR        acetaminophen     Tablet .. 650 milliGRAM(s) Oral every 6 hours PRN  allopurinol 100 milliGRAM(s) Oral daily  aluminum hydroxide/magnesium hydroxide/simethicone Suspension 30 milliLiter(s) Oral every 4 hours PRN  aspirin  chewable 81 milliGRAM(s) Oral daily  atorvastatin 10 milliGRAM(s) Oral at bedtime  azithromycin  IVPB 500 milliGRAM(s) IV Intermittent every 24 hours  benzonatate 100 milliGRAM(s) Oral every 8 hours PRN  cefTRIAXone Injectable. 1000 milliGRAM(s) IV Push every 24 hours  dextrose 5%. 1000 milliLiter(s) IV Continuous <Continuous>  dextrose 5%. 1000 milliLiter(s) IV Continuous <Continuous>  dextrose 50% Injectable 25 Gram(s) IV Push once  dextrose 50% Injectable 12.5 Gram(s) IV Push once  dextrose 50% Injectable 25 Gram(s) IV Push once  dextrose Oral Gel 15 Gram(s) Oral once PRN  enoxaparin Injectable 40 milliGRAM(s) SubCutaneous every 24 hours  furosemide   Injectable 40 milliGRAM(s) IV Push daily  glucagon  Injectable 1 milliGRAM(s) IntraMuscular once  guaiFENesin  milliGRAM(s) Oral every 12 hours  insulin lispro (ADMELOG) corrective regimen sliding scale   SubCutaneous three times a day before meals  insulin lispro (ADMELOG) corrective regimen sliding scale   SubCutaneous at bedtime  lisinopril 20 milliGRAM(s) Oral daily  melatonin 3 milliGRAM(s) Oral at bedtime PRN  ondansetron Injectable 4 milliGRAM(s) IV Push every 8 hours PRN  sodium chloride 0.9% for Nebulization 3 milliLiter(s) Nebulizer every 6 hours  spironolactone 25 milliGRAM(s) Oral daily          Physical Exam:  T(C): 36.8 (01-07-25 @ 12:05), Max: 37.5 (01-07-25 @ 00:36)  HR: 94 (01-07-25 @ 12:05) (94 - 106)  BP: 113/70 (01-07-25 @ 12:05) (113/70 - 137/87)  RR: 18 (01-07-25 @ 12:05) (18 - 18)  SpO2: 92% (01-07-25 @ 12:05) (92% - 98%)  Wt(kg): --  General: Comfortable in NAD  Neck: No JVD  CVS: nl s1s2, no s3  Pulm: CTA b/l  Abd: soft, non-tender  Ext: No c/c/e  Neuro A&O x3  Psych: Normal affect      Labs:   07 Jan 2025 05:05    136    |  100    |  19.6   ----------------------------<  239    4.1     |  25.0   |  0.96     Ca    8.4        07 Jan 2025 05:05  Phos  3.2       07 Jan 2025 05:05  Mg     1.7       07 Jan 2025 05:05                            11.2   11.03 )-----------( CLUMPED    ( 07 Jan 2025 05:05 )             33.9           LHC (4/2024):  Normal coronary arteries.    Echo ([id number]):   1. Technically difficult image quality.   2. Left ventricular systolic function is low normal with an ejection fraction of 50 % by Stack's method of disks with an ejection fraction visually estimated at 50 to 55 %.   3. There is no evidence of a left ventricular thrombus.   4. The left ventricular diastolic function is indeterminate.   5. Estimated pulmonary artery systolic pressure is 49 mmHg, consistent with mild to moderate pulmonary hypertension.   6. There is mild calcification of the mitral valve annulus.   7. Normal left and right atrial size.   8. A 27mm Abbott Navitor Vision TAVR is visualized in the aortic position. Mild paravalvular regurgitation visualized. Mean gradient of 6 mmHg. AT= 88.09msec. Dimensionless index= .81.   9. No pericardial effusion seen.  10. Compared to the transthoracic echocardiogram performed on 4/14/2024, interval TAVR done for low gradient severe AS.    RADIOLOGY:  CTC:|  Right middle lobe consolidation. Additional bilateral lung nodular groundglass opacities. Interlobular septal thickening. Differential   includes infection and pulmonary edema. Malignancy cannot be entirely excluded and follow-up is recommended in 8-12 weeks to assess resolution.  Bilateral moderate pleural effusions,right greater left with adjacent atelectasis      < from: TTE Limited W or WO Ultrasound Enhancing Agent (01.06.25 @ 11:34) >   1. Left ventricular systolic function is severely decreased with an ejection fraction of 25 % by Stack's method of disks. Global left ventricular hypokinesis.   2. There is severe (grade 3) left ventricular diastolic dysfunction, with elevated left ventricular filling pressure.   3. Normal right ventricular cavity size and normal right ventricular systolic function.   4. Left atrium is mildly dilated.   5. A Transcatheter deployed (TAVR) valve replacement is present in the aortic position The prosthetic valve is well seated with normal function. No prosthetic aortic stenosis. No intravalvular regurgitation.   6. Compared to the transthoracic echocardiogram performed on 6/7/2024, There is a new drop in the left ventricular systolic function.      Assessment:  83 y/o male PMHsAS s/p TAVR, DM, HTN, HLD, LBBB sent to the ED from urgent care for evaluation of ?CHF.  Patient with symptoms of cough and congestion for a week that were not getting better.  Evaluation in the ER includes a CAT scan showing right middle lobe pneumonia with associated pleural effusions and a degree of CHF.  Patient is not short of breath at all or hypoxic.  Will treat with antibiotics and some diuresis.  Will get echocardiogram to evaluate given recent TAVR and CHF.    Compared to the transthoracic echocardiogram performed on 6/7/2024, There is a new drop in the left ventricular systolic function.  normal coronaries in 4/2024, CMP likely NICM  slightly vol up on exam       Plan:  1.  continue with Lasix IV daily.  Monitor renal function and electrolytes  2.  Continue with ACEi, amlodipine stopped, aldactone ordered for tomorrow. [id number] later on this admission   3.  Low suspicion for obstructive CAD given recent LHC with normal coronaries, will perform nuclear stress test tomorrow for completion of CAD workup in the setting of new onset of CMP. NPO after midnight   4.  Treatment of pneumonia per primary team.  5.  pleural effusion management as per primary team and other consultants.   6.  Further recs post stress test.     D/w Dr Wright       
Chief complaint: PNA    Patient seen and examined at bedside. No acute overnight events reported. No fever, chills, chest pain or shortness of breath.     Vital Signs Last 24 Hrs  T(F): 99.3 (08 Jan 2025 09:24), Max: 99.3 (08 Jan 2025 09:24)  HR: 103 (08 Jan 2025 09:32) (94 - 105)  BP: 134/79 (08 Jan 2025 09:24) (102/64 - 134/79)  RR: 18 (08 Jan 2025 09:24) (18 - 18)  SpO2: 96% (08 Jan 2025 09:32) (92% - 96%)    Physical Exam:  Constitutional: alert and oriented, in no acute distress   Neck: Soft and supple  Respiratory: Clear to auscultation bilaterally  Cardiovascular: Regular rate and rhyhtm  Gastrointestinal: Soft, non-tender to palpation, +bs  Vascular: 2+ peripheral pulses  Neurological: A/O x 3, no focal neurological deficits  Musculoskeletal: 5/5 strength b/l upper and lower extremities, no lower extremity edema bilaterally    Labs:                        11.0   9.38  )-----------( 216      ( 08 Jan 2025 04:37 )             32.7   01-08    137  |  100  |  23.8[H]  ----------------------------<  232[H]  4.3   |  23.0  |  1.07    Ca    8.6      08 Jan 2025 04:37  Phos  3.2     01-07  Mg     1.7     01-07

## 2025-01-09 ENCOUNTER — TRANSCRIPTION ENCOUNTER (OUTPATIENT)
Age: 85
End: 2025-01-09

## 2025-01-09 VITALS
TEMPERATURE: 98 F | OXYGEN SATURATION: 94 % | DIASTOLIC BLOOD PRESSURE: 73 MMHG | SYSTOLIC BLOOD PRESSURE: 145 MMHG | HEART RATE: 95 BPM | RESPIRATION RATE: 18 BRPM

## 2025-01-09 LAB
CULTURE RESULTS: SIGNIFICANT CHANGE UP
GLUCOSE BLDC GLUCOMTR-MCNC: 266 MG/DL — HIGH (ref 70–99)
SPECIMEN SOURCE: SIGNIFICANT CHANGE UP

## 2025-01-09 PROCEDURE — 84145 PROCALCITONIN (PCT): CPT

## 2025-01-09 PROCEDURE — 93321 DOPPLER ECHO F-UP/LMTD STD: CPT

## 2025-01-09 PROCEDURE — 87899 AGENT NOS ASSAY W/OPTIC: CPT

## 2025-01-09 PROCEDURE — 87449 NOS EACH ORGANISM AG IA: CPT

## 2025-01-09 PROCEDURE — A9500: CPT

## 2025-01-09 PROCEDURE — 71046 X-RAY EXAM CHEST 2 VIEWS: CPT

## 2025-01-09 PROCEDURE — 80053 COMPREHEN METABOLIC PANEL: CPT

## 2025-01-09 PROCEDURE — 71045 X-RAY EXAM CHEST 1 VIEW: CPT

## 2025-01-09 PROCEDURE — 71045 X-RAY EXAM CHEST 1 VIEW: CPT | Mod: 26

## 2025-01-09 PROCEDURE — 84132 ASSAY OF SERUM POTASSIUM: CPT

## 2025-01-09 PROCEDURE — 83605 ASSAY OF LACTIC ACID: CPT

## 2025-01-09 PROCEDURE — 85025 COMPLETE CBC W/AUTO DIFF WBC: CPT

## 2025-01-09 PROCEDURE — 87070 CULTURE OTHR SPECIMN AEROBIC: CPT

## 2025-01-09 PROCEDURE — 82947 ASSAY GLUCOSE BLOOD QUANT: CPT

## 2025-01-09 PROCEDURE — 84100 ASSAY OF PHOSPHORUS: CPT

## 2025-01-09 PROCEDURE — 99285 EMERGENCY DEPT VISIT HI MDM: CPT

## 2025-01-09 PROCEDURE — C8924: CPT

## 2025-01-09 PROCEDURE — 93005 ELECTROCARDIOGRAM TRACING: CPT

## 2025-01-09 PROCEDURE — 87641 MR-STAPH DNA AMP PROBE: CPT

## 2025-01-09 PROCEDURE — 96375 TX/PRO/DX INJ NEW DRUG ADDON: CPT

## 2025-01-09 PROCEDURE — 93325 DOPPLER ECHO COLOR FLOW MAPG: CPT

## 2025-01-09 PROCEDURE — 87640 STAPH A DNA AMP PROBE: CPT

## 2025-01-09 PROCEDURE — 82435 ASSAY OF BLOOD CHLORIDE: CPT

## 2025-01-09 PROCEDURE — 87040 BLOOD CULTURE FOR BACTERIA: CPT

## 2025-01-09 PROCEDURE — 84295 ASSAY OF SERUM SODIUM: CPT

## 2025-01-09 PROCEDURE — 85014 HEMATOCRIT: CPT

## 2025-01-09 PROCEDURE — 71250 CT THORAX DX C-: CPT | Mod: MC

## 2025-01-09 PROCEDURE — 83880 ASSAY OF NATRIURETIC PEPTIDE: CPT

## 2025-01-09 PROCEDURE — 85018 HEMOGLOBIN: CPT

## 2025-01-09 PROCEDURE — 85027 COMPLETE CBC AUTOMATED: CPT

## 2025-01-09 PROCEDURE — 80048 BASIC METABOLIC PNL TOTAL CA: CPT

## 2025-01-09 PROCEDURE — 96374 THER/PROPH/DIAG INJ IV PUSH: CPT

## 2025-01-09 PROCEDURE — 99239 HOSP IP/OBS DSCHRG MGMT >30: CPT

## 2025-01-09 PROCEDURE — 82962 GLUCOSE BLOOD TEST: CPT

## 2025-01-09 PROCEDURE — 82803 BLOOD GASES ANY COMBINATION: CPT

## 2025-01-09 PROCEDURE — 78452 HT MUSCLE IMAGE SPECT MULT: CPT

## 2025-01-09 PROCEDURE — 94640 AIRWAY INHALATION TREATMENT: CPT

## 2025-01-09 PROCEDURE — 83735 ASSAY OF MAGNESIUM: CPT

## 2025-01-09 PROCEDURE — 82330 ASSAY OF CALCIUM: CPT

## 2025-01-09 PROCEDURE — 87205 SMEAR GRAM STAIN: CPT

## 2025-01-09 PROCEDURE — 93017 CV STRESS TEST TRACING ONLY: CPT

## 2025-01-09 PROCEDURE — 0225U NFCT DS DNA&RNA 21 SARSCOV2: CPT

## 2025-01-09 PROCEDURE — 36415 COLL VENOUS BLD VENIPUNCTURE: CPT

## 2025-01-09 RX ORDER — FUROSEMIDE 20 MG
1 TABLET ORAL
Qty: 30 | Refills: 0
Start: 2025-01-09 | End: 2025-02-07

## 2025-01-09 RX ORDER — BENZONATATE 100 MG
1 CAPSULE ORAL
Qty: 21 | Refills: 0
Start: 2025-01-09 | End: 2025-01-15

## 2025-01-09 RX ORDER — SPIRONOLACTONE 50 MG/1
1 TABLET ORAL
Qty: 30 | Refills: 0
Start: 2025-01-09 | End: 2025-02-07

## 2025-01-09 RX ORDER — FUROSEMIDE 20 MG
40 TABLET ORAL DAILY
Refills: 0 | Status: DISCONTINUED | OUTPATIENT
Start: 2025-01-09 | End: 2025-01-09

## 2025-01-09 RX ADMIN — ACETAMINOPHEN 650 MILLIGRAM(S): 80 SOLUTION/ DROPS ORAL at 08:12

## 2025-01-09 RX ADMIN — Medication 40 MILLIGRAM(S): at 05:26

## 2025-01-09 RX ADMIN — SPIRONOLACTONE 25 MILLIGRAM(S): 50 TABLET ORAL at 05:26

## 2025-01-09 RX ADMIN — LISINOPRIL 20 MILLIGRAM(S): 30 TABLET ORAL at 05:26

## 2025-01-09 RX ADMIN — Medication 600 MILLIGRAM(S): at 05:26

## 2025-01-09 RX ADMIN — Medication 6: at 08:17

## 2025-01-09 NOTE — DISCHARGE NOTE NURSING/CASE MANAGEMENT/SOCIAL WORK - FINANCIAL ASSISTANCE
[facility] provides services at a reduced cost to those who are determined to be eligible through [facility]’s financial assistance program. Information regarding [facility]’s financial assistance program can be found by going to [email or web address] or by calling [phone].

## 2025-01-09 NOTE — DISCHARGE NOTE NURSING/CASE MANAGEMENT/SOCIAL WORK - PATIENT PORTAL LINK FT
You can access the FollowMyHealth Patient Portal offered by [facility] by registering at the following website: [email or web address]. By joining [employer]’s FollowMyHealth portal, you will also be able to view your health information using other applications (apps) compatible with our system.

## 2025-01-10 ENCOUNTER — NON-APPOINTMENT (OUTPATIENT)
Age: 85
End: 2025-01-10

## 2025-01-16 ENCOUNTER — APPOINTMENT (OUTPATIENT)
Dept: FAMILY MEDICINE | Facility: CLINIC | Age: 85
End: 2025-01-16
Payer: MEDICARE

## 2025-01-16 VITALS
HEART RATE: 103 BPM | DIASTOLIC BLOOD PRESSURE: 68 MMHG | WEIGHT: 142 LBS | BODY MASS INDEX: 19.23 KG/M2 | HEIGHT: 72 IN | SYSTOLIC BLOOD PRESSURE: 136 MMHG | OXYGEN SATURATION: 99 %

## 2025-01-16 DIAGNOSIS — G47.00 INSOMNIA, UNSPECIFIED: ICD-10-CM

## 2025-01-16 DIAGNOSIS — Z02.89 ENCOUNTER FOR OTHER ADMINISTRATIVE EXAMINATIONS: ICD-10-CM

## 2025-01-16 DIAGNOSIS — I50.20 UNSPECIFIED SYSTOLIC (CONGESTIVE) HEART FAILURE: ICD-10-CM

## 2025-01-16 DIAGNOSIS — R06.2 WHEEZING: ICD-10-CM

## 2025-01-16 DIAGNOSIS — E11.9 TYPE 2 DIABETES MELLITUS W/OUT COMPLICATIONS: ICD-10-CM

## 2025-01-16 DIAGNOSIS — J18.9 PNEUMONIA, UNSPECIFIED ORGANISM: ICD-10-CM

## 2025-01-16 PROCEDURE — 99214 OFFICE O/P EST MOD 30 MIN: CPT

## 2025-01-16 RX ORDER — SPIRONOLACTONE 25 MG/1
25 TABLET ORAL
Qty: 90 | Refills: 1 | Status: ACTIVE | COMMUNITY
Start: 2025-01-16 | End: 1900-01-01

## 2025-01-16 RX ORDER — ALBUTEROL SULFATE 2.5 MG/3ML
(2.5 MG/3ML) SOLUTION RESPIRATORY (INHALATION) TWICE DAILY
Qty: 20 | Refills: 0 | Status: ACTIVE | COMMUNITY
Start: 2025-01-16 | End: 1900-01-01

## 2025-01-16 RX ORDER — SIMVASTATIN 40 MG/1
40 TABLET, FILM COATED ORAL
Qty: 90 | Refills: 0 | Status: ACTIVE | COMMUNITY
Start: 2025-01-16

## 2025-01-16 RX ORDER — SACUBITRIL AND VALSARTAN 24; 26 MG/1; MG/1
24-26 TABLET, FILM COATED ORAL
Refills: 0 | Status: ACTIVE | COMMUNITY
Start: 2025-01-16

## 2025-01-16 RX ORDER — DAPAGLIFLOZIN 10 MG/1
10 TABLET, FILM COATED ORAL DAILY
Qty: 90 | Refills: 0 | Status: ACTIVE | COMMUNITY

## 2025-01-16 RX ORDER — GABAPENTIN 100 MG/1
100 CAPSULE ORAL
Qty: 30 | Refills: 0 | Status: ACTIVE | COMMUNITY
Start: 2025-01-16 | End: 1900-01-01

## 2025-01-16 RX ORDER — FUROSEMIDE 40 MG/1
40 TABLET ORAL
Refills: 0 | Status: ACTIVE | COMMUNITY
Start: 2025-01-16

## 2025-01-16 RX ORDER — CARVEDILOL 6.25 MG/1
6.25 TABLET, FILM COATED ORAL
Refills: 0 | Status: ACTIVE | COMMUNITY

## 2025-02-04 ENCOUNTER — TRANSCRIPTION ENCOUNTER (OUTPATIENT)
Age: 85
End: 2025-02-04

## 2025-02-12 ENCOUNTER — APPOINTMENT (OUTPATIENT)
Dept: FAMILY MEDICINE | Facility: CLINIC | Age: 85
End: 2025-02-12
Payer: MEDICARE

## 2025-02-12 VITALS — SYSTOLIC BLOOD PRESSURE: 110 MMHG | DIASTOLIC BLOOD PRESSURE: 58 MMHG

## 2025-02-12 VITALS
HEIGHT: 72 IN | OXYGEN SATURATION: 94 % | HEART RATE: 53 BPM | WEIGHT: 144 LBS | DIASTOLIC BLOOD PRESSURE: 70 MMHG | SYSTOLIC BLOOD PRESSURE: 120 MMHG | BODY MASS INDEX: 19.5 KG/M2

## 2025-02-12 DIAGNOSIS — J18.9 PNEUMONIA, UNSPECIFIED ORGANISM: ICD-10-CM

## 2025-02-12 PROCEDURE — 36415 COLL VENOUS BLD VENIPUNCTURE: CPT

## 2025-02-12 PROCEDURE — 99214 OFFICE O/P EST MOD 30 MIN: CPT

## 2025-02-12 PROCEDURE — G2211 COMPLEX E/M VISIT ADD ON: CPT

## 2025-02-13 PROBLEM — N17.9 ACUTE KIDNEY INJURY: Status: ACTIVE | Noted: 2025-02-12

## 2025-02-13 LAB
ALBUMIN SERPL ELPH-MCNC: 4.7 G/DL
ALP BLD-CCNC: 81 U/L
ALT SERPL-CCNC: 14 U/L
ANION GAP SERPL CALC-SCNC: 15 MMOL/L
AST SERPL-CCNC: 27 U/L
BILIRUB SERPL-MCNC: 0.4 MG/DL
BUN SERPL-MCNC: 58 MG/DL
CALCIUM SERPL-MCNC: 10.2 MG/DL
CHLORIDE SERPL-SCNC: 101 MMOL/L
CO2 SERPL-SCNC: 20 MMOL/L
CREAT SERPL-MCNC: 1.67 MG/DL
EGFR: 40 ML/MIN/1.73M2
GLUCOSE SERPL-MCNC: 145 MG/DL
HCT VFR BLD CALC: 42.6 %
HGB BLD-MCNC: 13.7 G/DL
MCHC RBC-ENTMCNC: 32.2 G/DL
MCHC RBC-ENTMCNC: 33 PG
MCV RBC AUTO: 102.7 FL
PLATELET # BLD AUTO: 144 K/UL
POTASSIUM SERPL-SCNC: 6 MMOL/L
PROT SERPL-MCNC: 7.1 G/DL
RBC # BLD: 4.15 M/UL
RBC # FLD: 13.3 %
SODIUM SERPL-SCNC: 137 MMOL/L
WBC # FLD AUTO: 7.03 K/UL

## 2025-02-19 ENCOUNTER — APPOINTMENT (OUTPATIENT)
Dept: FAMILY MEDICINE | Facility: CLINIC | Age: 85
End: 2025-02-19
Payer: MEDICARE

## 2025-02-19 VITALS
WEIGHT: 150 LBS | OXYGEN SATURATION: 98 % | DIASTOLIC BLOOD PRESSURE: 56 MMHG | HEIGHT: 72 IN | SYSTOLIC BLOOD PRESSURE: 110 MMHG | BODY MASS INDEX: 20.32 KG/M2 | HEART RATE: 76 BPM

## 2025-02-19 DIAGNOSIS — N17.9 ACUTE KIDNEY FAILURE, UNSPECIFIED: ICD-10-CM

## 2025-02-19 PROCEDURE — 99213 OFFICE O/P EST LOW 20 MIN: CPT

## 2025-02-19 PROCEDURE — 36415 COLL VENOUS BLD VENIPUNCTURE: CPT

## 2025-02-19 PROCEDURE — G2211 COMPLEX E/M VISIT ADD ON: CPT

## 2025-02-20 LAB
ANION GAP SERPL CALC-SCNC: 12 MMOL/L
BUN SERPL-MCNC: 34 MG/DL
CALCIUM SERPL-MCNC: 9.2 MG/DL
CHLORIDE SERPL-SCNC: 105 MMOL/L
CO2 SERPL-SCNC: 23 MMOL/L
CREAT SERPL-MCNC: 1.37 MG/DL
EGFR: 51 ML/MIN/1.73M2
GLUCOSE SERPL-MCNC: 290 MG/DL
POTASSIUM SERPL-SCNC: 5 MMOL/L
SODIUM SERPL-SCNC: 140 MMOL/L

## 2025-02-25 ENCOUNTER — APPOINTMENT (OUTPATIENT)
Dept: FAMILY MEDICINE | Facility: CLINIC | Age: 85
End: 2025-02-25

## 2025-03-03 ENCOUNTER — APPOINTMENT (OUTPATIENT)
Dept: NEUROLOGY | Facility: CLINIC | Age: 85
End: 2025-03-03
Payer: MEDICARE

## 2025-03-03 VITALS
HEIGHT: 72 IN | WEIGHT: 150 LBS | DIASTOLIC BLOOD PRESSURE: 80 MMHG | HEART RATE: 89 BPM | BODY MASS INDEX: 20.32 KG/M2 | SYSTOLIC BLOOD PRESSURE: 158 MMHG

## 2025-03-03 DIAGNOSIS — G31.84 MILD COGNITIVE IMPAIRMENT, SO STATED: ICD-10-CM

## 2025-03-03 PROCEDURE — 99213 OFFICE O/P EST LOW 20 MIN: CPT

## 2025-03-03 PROCEDURE — G2211 COMPLEX E/M VISIT ADD ON: CPT

## 2025-03-10 ENCOUNTER — TRANSCRIPTION ENCOUNTER (OUTPATIENT)
Age: 85
End: 2025-03-10

## 2025-03-21 ENCOUNTER — APPOINTMENT (OUTPATIENT)
Dept: NEPHROLOGY | Facility: CLINIC | Age: 85
End: 2025-03-21
Payer: MEDICARE

## 2025-03-21 ENCOUNTER — NON-APPOINTMENT (OUTPATIENT)
Age: 85
End: 2025-03-21

## 2025-03-21 VITALS
OXYGEN SATURATION: 100 % | HEART RATE: 79 BPM | DIASTOLIC BLOOD PRESSURE: 94 MMHG | BODY MASS INDEX: 21.02 KG/M2 | WEIGHT: 155 LBS | SYSTOLIC BLOOD PRESSURE: 152 MMHG

## 2025-03-21 PROCEDURE — 99204 OFFICE O/P NEW MOD 45 MIN: CPT

## 2025-03-24 LAB
APPEARANCE: CLEAR
BACTERIA: NEGATIVE /HPF
BILIRUBIN URINE: NEGATIVE
BLOOD URINE: NEGATIVE
CAST: 0 /LPF
COLOR: YELLOW
CREAT SPEC-SCNC: 36 MG/DL
EPITHELIAL CELLS: 0 /HPF
GLUCOSE QUALITATIVE U: >=1000 MG/DL
KETONES URINE: NEGATIVE MG/DL
LEUKOCYTE ESTERASE URINE: NEGATIVE
MICROALBUMIN 24H UR DL<=1MG/L-MCNC: <1.2 MG/DL
MICROALBUMIN/CREAT 24H UR-RTO: NORMAL MG/G
MICROSCOPIC-UA: NORMAL
NITRITE URINE: NEGATIVE
PH URINE: 5.5
PROTEIN URINE: NEGATIVE MG/DL
RED BLOOD CELLS URINE: 1 /HPF
SPECIFIC GRAVITY URINE: 1.02
UROBILINOGEN URINE: 0.2 MG/DL
WHITE BLOOD CELLS URINE: 0 /HPF

## 2025-04-03 ENCOUNTER — TRANSCRIPTION ENCOUNTER (OUTPATIENT)
Age: 85
End: 2025-04-03

## 2025-04-08 ENCOUNTER — TRANSCRIPTION ENCOUNTER (OUTPATIENT)
Age: 85
End: 2025-04-08

## 2025-07-09 ENCOUNTER — TRANSCRIPTION ENCOUNTER (OUTPATIENT)
Age: 85
End: 2025-07-09

## 2025-08-18 ENCOUNTER — RX RENEWAL (OUTPATIENT)
Age: 85
End: 2025-08-18

## 2025-09-06 ENCOUNTER — RX RENEWAL (OUTPATIENT)
Age: 85
End: 2025-09-06

## 2025-09-08 ENCOUNTER — RX RENEWAL (OUTPATIENT)
Age: 85
End: 2025-09-08

## 2025-09-15 ENCOUNTER — APPOINTMENT (OUTPATIENT)
Dept: NEUROLOGY | Facility: CLINIC | Age: 85
End: 2025-09-15

## (undated) DEVICE — POSITIONER FOAM EGG CRATE ULNAR 2PCS (PINK)

## (undated) DEVICE — SOL INJ NS 0.9% 1000ML

## (undated) DEVICE — VISITEC 4X4

## (undated) DEVICE — GOWN TRIMAX LG

## (undated) DEVICE — DRSG MEPILEX 10 X 30CM (4 X 12") WHITE

## (undated) DEVICE — DRAPE TOWEL BLUE 17" X 24"

## (undated) DEVICE — SOL IRR POUR NS 0.9% 1000ML

## (undated) DEVICE — DRSG TEGADERM 4X4.75"

## (undated) DEVICE — SUT SILK 0 30" SH

## (undated) DEVICE — PACK MINOR WITH LAP

## (undated) DEVICE — SYR LUER LOK 20CC

## (undated) DEVICE — SUT MONOCRYL 4-0 27" PS-2 UNDYED

## (undated) DEVICE — PREP CHLORAPREP HI-LITE ORANGE 26ML

## (undated) DEVICE — DRSG MASTISOL

## (undated) DEVICE — WOUND IRR IRRISEPT W 0.5 CHG

## (undated) DEVICE — DRAPE C ARM UNIVERSAL

## (undated) DEVICE — DRAPE DOME BAG 22"

## (undated) DEVICE — PREP SCRUB BRUSH W CHG 4%

## (undated) DEVICE — PACK BASIC GOWN

## (undated) DEVICE — DRAPE CV 106" X 135"

## (undated) DEVICE — GLV 7.5 PROTEXIS (WHITE)

## (undated) DEVICE — WARMING BLANKET FULL UNDERBODY

## (undated) DEVICE — DRAPE 3/4 SHEET 52X76"

## (undated) DEVICE — ELCTR MULTIFUNCTION DEFIBRILLATION ELECTRODE EDGE SYSTEM ADULT